# Patient Record
Sex: MALE | Race: OTHER | HISPANIC OR LATINO | Employment: OTHER | URBAN - METROPOLITAN AREA
[De-identification: names, ages, dates, MRNs, and addresses within clinical notes are randomized per-mention and may not be internally consistent; named-entity substitution may affect disease eponyms.]

---

## 2021-09-21 ENCOUNTER — HOSPITAL ENCOUNTER (EMERGENCY)
Facility: HOSPITAL | Age: 36
Discharge: HOME | End: 2021-09-22
Attending: EMERGENCY MEDICINE

## 2021-09-21 DIAGNOSIS — I10 HYPERTENSION: ICD-10-CM

## 2021-09-21 DIAGNOSIS — R74.8 ELEVATED LIPASE: ICD-10-CM

## 2021-09-21 DIAGNOSIS — R10.13 EPIGASTRIC ABDOMINAL PAIN: ICD-10-CM

## 2021-09-21 DIAGNOSIS — F10.10 ALCOHOL ABUSE: Primary | ICD-10-CM

## 2021-09-21 LAB — OB PNL STL: NEGATIVE

## 2021-09-21 PROCEDURE — 96366 THER/PROPH/DIAG IV INF ADDON: CPT

## 2021-09-21 PROCEDURE — 99285 EMERGENCY DEPT VISIT HI MDM: CPT | Mod: 25

## 2021-09-21 PROCEDURE — 83735 ASSAY OF MAGNESIUM: CPT | Performed by: EMERGENCY MEDICINE

## 2021-09-21 PROCEDURE — 82272 OCCULT BLD FECES 1-3 TESTS: CPT | Performed by: EMERGENCY MEDICINE

## 2021-09-21 PROCEDURE — 82077 ASSAY SPEC XCP UR&BREATH IA: CPT | Performed by: EMERGENCY MEDICINE

## 2021-09-21 PROCEDURE — 36415 COLL VENOUS BLD VENIPUNCTURE: CPT | Performed by: EMERGENCY MEDICINE

## 2021-09-21 PROCEDURE — 84484 ASSAY OF TROPONIN QUANT: CPT | Performed by: EMERGENCY MEDICINE

## 2021-09-21 PROCEDURE — 96375 TX/PRO/DX INJ NEW DRUG ADDON: CPT

## 2021-09-21 PROCEDURE — 96365 THER/PROPH/DIAG IV INF INIT: CPT

## 2021-09-21 PROCEDURE — 86900 BLOOD TYPING SEROLOGIC ABO: CPT | Performed by: EMERGENCY MEDICINE

## 2021-09-21 PROCEDURE — 85025 COMPLETE CBC W/AUTO DIFF WBC: CPT | Performed by: EMERGENCY MEDICINE

## 2021-09-21 PROCEDURE — 85610 PROTHROMBIN TIME: CPT | Performed by: EMERGENCY MEDICINE

## 2021-09-21 PROCEDURE — 80053 COMPREHEN METABOLIC PANEL: CPT | Performed by: EMERGENCY MEDICINE

## 2021-09-21 PROCEDURE — 83690 ASSAY OF LIPASE: CPT | Performed by: EMERGENCY MEDICINE

## 2021-09-21 RX ORDER — IBUPROFEN 200 MG
200 TABLET ORAL EVERY 6 HOURS PRN
COMMUNITY
End: 2022-04-22

## 2021-09-21 RX ORDER — ONDANSETRON 2 MG/ML
4 INJECTION INTRAMUSCULAR; INTRAVENOUS
Status: COMPLETED | OUTPATIENT
Start: 2021-09-21 | End: 2021-09-22

## 2021-09-21 RX ORDER — MORPHINE SULFATE 4 MG/ML
4 INJECTION, SOLUTION INTRAMUSCULAR; INTRAVENOUS
Status: COMPLETED | OUTPATIENT
Start: 2021-09-21 | End: 2021-09-22

## 2021-09-22 VITALS
RESPIRATION RATE: 20 BRPM | HEART RATE: 64 BPM | SYSTOLIC BLOOD PRESSURE: 149 MMHG | BODY MASS INDEX: 26.43 KG/M2 | TEMPERATURE: 98 F | OXYGEN SATURATION: 98 % | WEIGHT: 140 LBS | DIASTOLIC BLOOD PRESSURE: 85 MMHG | HEIGHT: 61 IN

## 2021-09-22 LAB
ABO + RH BLD: NORMAL
ALBUMIN SERPL BCP-MCNC: 4.3 G/DL (ref 3.5–5.2)
ALP SERPL-CCNC: 75 U/L (ref 55–135)
ALT SERPL W/O P-5'-P-CCNC: 27 U/L (ref 10–44)
AMPHET+METHAMPHET UR QL: NEGATIVE
ANION GAP SERPL CALC-SCNC: 14 MMOL/L (ref 8–16)
AST SERPL-CCNC: 39 U/L (ref 10–40)
BARBITURATES UR QL SCN>200 NG/ML: NEGATIVE
BASOPHILS # BLD AUTO: 0.03 K/UL (ref 0–0.2)
BASOPHILS NFR BLD: 0.6 % (ref 0–1.9)
BENZODIAZ UR QL SCN>200 NG/ML: NEGATIVE
BILIRUB SERPL-MCNC: 1 MG/DL (ref 0.1–1)
BILIRUB UR QL STRIP: NEGATIVE
BLD GP AB SCN CELLS X3 SERPL QL: NORMAL
BUN SERPL-MCNC: 7 MG/DL (ref 6–20)
BZE UR QL SCN: NEGATIVE
CALCIUM SERPL-MCNC: 8.7 MG/DL (ref 8.7–10.5)
CANNABINOIDS UR QL SCN: NEGATIVE
CHLORIDE SERPL-SCNC: 100 MMOL/L (ref 95–110)
CLARITY UR: CLEAR
CO2 SERPL-SCNC: 25 MMOL/L (ref 23–29)
COLOR UR: YELLOW
CREAT SERPL-MCNC: 0.7 MG/DL (ref 0.5–1.4)
CREAT UR-MCNC: <10 MG/DL (ref 23–375)
DIFFERENTIAL METHOD: ABNORMAL
EOSINOPHIL # BLD AUTO: 0.1 K/UL (ref 0–0.5)
EOSINOPHIL NFR BLD: 1.2 % (ref 0–8)
ERYTHROCYTE [DISTWIDTH] IN BLOOD BY AUTOMATED COUNT: 12.6 % (ref 11.5–14.5)
EST. GFR  (AFRICAN AMERICAN): >60 ML/MIN/1.73 M^2
EST. GFR  (NON AFRICAN AMERICAN): >60 ML/MIN/1.73 M^2
ETHANOL SERPL-MCNC: 336 MG/DL
GLUCOSE SERPL-MCNC: 129 MG/DL (ref 70–110)
GLUCOSE UR QL STRIP: NEGATIVE
HCT VFR BLD AUTO: 40.2 % (ref 40–54)
HGB BLD-MCNC: 14.1 G/DL (ref 14–18)
HGB UR QL STRIP: NEGATIVE
IMM GRANULOCYTES # BLD AUTO: 0.01 K/UL (ref 0–0.04)
IMM GRANULOCYTES NFR BLD AUTO: 0.2 % (ref 0–0.5)
INR PPP: 1.1
KETONES UR QL STRIP: NEGATIVE
LEUKOCYTE ESTERASE UR QL STRIP: NEGATIVE
LIPASE SERPL-CCNC: 109 U/L (ref 4–60)
LYMPHOCYTES # BLD AUTO: 2 K/UL (ref 1–4.8)
LYMPHOCYTES NFR BLD: 39.2 % (ref 18–48)
MAGNESIUM SERPL-MCNC: 1.9 MG/DL (ref 1.6–2.6)
MCH RBC QN AUTO: 31 PG (ref 27–31)
MCHC RBC AUTO-ENTMCNC: 35.1 G/DL (ref 32–36)
MCV RBC AUTO: 88 FL (ref 82–98)
MONOCYTES # BLD AUTO: 0.4 K/UL (ref 0.3–1)
MONOCYTES NFR BLD: 7.6 % (ref 4–15)
NEUTROPHILS # BLD AUTO: 2.6 K/UL (ref 1.8–7.7)
NEUTROPHILS NFR BLD: 51.2 % (ref 38–73)
NITRITE UR QL STRIP: NEGATIVE
NRBC BLD-RTO: 0 /100 WBC
OPIATES UR QL SCN: ABNORMAL
PCP UR QL SCN>25 NG/ML: NEGATIVE
PH UR STRIP: 8 [PH] (ref 5–8)
PLATELET # BLD AUTO: 245 K/UL (ref 150–450)
PMV BLD AUTO: 8.9 FL (ref 9.2–12.9)
POTASSIUM SERPL-SCNC: 3.2 MMOL/L (ref 3.5–5.1)
PROT SERPL-MCNC: 7.8 G/DL (ref 6–8.4)
PROT UR QL STRIP: NEGATIVE
PROTHROMBIN TIME: 13.4 SEC (ref 11.8–14.3)
RBC # BLD AUTO: 4.55 M/UL (ref 4.6–6.2)
SODIUM SERPL-SCNC: 139 MMOL/L (ref 136–145)
SP GR UR STRIP: 1.01 (ref 1–1.03)
TOXICOLOGY INFORMATION: ABNORMAL
TROPONIN I SERPL DL<=0.01 NG/ML-MCNC: <0.03 NG/ML
URN SPEC COLLECT METH UR: NORMAL
UROBILINOGEN UR STRIP-ACNC: NEGATIVE EU/DL
WBC # BLD AUTO: 5.1 K/UL (ref 3.9–12.7)

## 2021-09-22 PROCEDURE — 81003 URINALYSIS AUTO W/O SCOPE: CPT | Performed by: EMERGENCY MEDICINE

## 2021-09-22 PROCEDURE — 93010 ELECTROCARDIOGRAM REPORT: CPT | Mod: ,,, | Performed by: INTERNAL MEDICINE

## 2021-09-22 PROCEDURE — 93005 ELECTROCARDIOGRAM TRACING: CPT | Performed by: INTERNAL MEDICINE

## 2021-09-22 PROCEDURE — 96365 THER/PROPH/DIAG IV INF INIT: CPT

## 2021-09-22 PROCEDURE — 96375 TX/PRO/DX INJ NEW DRUG ADDON: CPT

## 2021-09-22 PROCEDURE — 80307 DRUG TEST PRSMV CHEM ANLYZR: CPT | Performed by: EMERGENCY MEDICINE

## 2021-09-22 PROCEDURE — 63600175 PHARM REV CODE 636 W HCPCS: Performed by: EMERGENCY MEDICINE

## 2021-09-22 PROCEDURE — 93010 EKG 12-LEAD: ICD-10-PCS | Mod: ,,, | Performed by: INTERNAL MEDICINE

## 2021-09-22 PROCEDURE — 25500020 PHARM REV CODE 255: Performed by: EMERGENCY MEDICINE

## 2021-09-22 PROCEDURE — 96366 THER/PROPH/DIAG IV INF ADDON: CPT

## 2021-09-22 PROCEDURE — 25000003 PHARM REV CODE 250: Performed by: EMERGENCY MEDICINE

## 2021-09-22 RX ORDER — PANTOPRAZOLE SODIUM 40 MG/1
40 TABLET, DELAYED RELEASE ORAL DAILY
Qty: 30 TABLET | Refills: 0 | Status: ON HOLD | OUTPATIENT
Start: 2021-09-22 | End: 2021-11-11

## 2021-09-22 RX ORDER — PANTOPRAZOLE SODIUM 40 MG/1
40 TABLET, DELAYED RELEASE ORAL DAILY
Qty: 30 TABLET | Refills: 0 | Status: SHIPPED | OUTPATIENT
Start: 2021-09-22 | End: 2021-09-22 | Stop reason: SDUPTHER

## 2021-09-22 RX ADMIN — MORPHINE SULFATE 4 MG: 4 INJECTION, SOLUTION INTRAMUSCULAR; INTRAVENOUS at 12:09

## 2021-09-22 RX ADMIN — SODIUM CHLORIDE 1000 ML: 0.9 INJECTION, SOLUTION INTRAVENOUS at 12:09

## 2021-09-22 RX ADMIN — FOLIC ACID: 5 INJECTION, SOLUTION INTRAMUSCULAR; INTRAVENOUS; SUBCUTANEOUS at 12:09

## 2021-09-22 RX ADMIN — ONDANSETRON 4 MG: 2 INJECTION INTRAMUSCULAR; INTRAVENOUS at 12:09

## 2021-09-22 RX ADMIN — IOHEXOL 100 ML: 350 INJECTION, SOLUTION INTRAVENOUS at 01:09

## 2021-09-25 ENCOUNTER — HOSPITAL ENCOUNTER (EMERGENCY)
Facility: HOSPITAL | Age: 36
Discharge: PSYCHIATRIC OR SUBSTANCE USE FACILITY | End: 2021-09-26
Attending: EMERGENCY MEDICINE
Payer: OTHER GOVERNMENT

## 2021-09-25 DIAGNOSIS — F23 ACUTE PSYCHOSIS: Primary | ICD-10-CM

## 2021-09-25 DIAGNOSIS — R44.3 HALLUCINATIONS: ICD-10-CM

## 2021-09-25 DIAGNOSIS — F22 PARANOID: ICD-10-CM

## 2021-09-25 PROCEDURE — 99285 EMERGENCY DEPT VISIT HI MDM: CPT

## 2021-09-26 VITALS
WEIGHT: 140 LBS | DIASTOLIC BLOOD PRESSURE: 80 MMHG | HEART RATE: 78 BPM | SYSTOLIC BLOOD PRESSURE: 135 MMHG | TEMPERATURE: 99 F | HEIGHT: 64 IN | RESPIRATION RATE: 18 BRPM | OXYGEN SATURATION: 98 % | BODY MASS INDEX: 23.9 KG/M2

## 2021-09-26 LAB
ALBUMIN SERPL BCP-MCNC: 4.4 G/DL (ref 3.5–5.2)
ALP SERPL-CCNC: 67 U/L (ref 55–135)
ALT SERPL W/O P-5'-P-CCNC: 32 U/L (ref 10–44)
AMPHET+METHAMPHET UR QL: NEGATIVE
ANION GAP SERPL CALC-SCNC: 13 MMOL/L (ref 8–16)
APAP SERPL-MCNC: <10 UG/ML (ref 10–20)
AST SERPL-CCNC: 65 U/L (ref 10–40)
BARBITURATES UR QL SCN>200 NG/ML: NEGATIVE
BASOPHILS # BLD AUTO: 0.02 K/UL (ref 0–0.2)
BASOPHILS NFR BLD: 0.3 % (ref 0–1.9)
BENZODIAZ UR QL SCN>200 NG/ML: NEGATIVE
BILIRUB SERPL-MCNC: 0.8 MG/DL (ref 0.1–1)
BILIRUB UR QL STRIP: NEGATIVE
BUN SERPL-MCNC: 9 MG/DL (ref 6–20)
BZE UR QL SCN: NEGATIVE
CALCIUM SERPL-MCNC: 8.8 MG/DL (ref 8.7–10.5)
CANNABINOIDS UR QL SCN: NEGATIVE
CHLORIDE SERPL-SCNC: 96 MMOL/L (ref 95–110)
CLARITY UR: CLEAR
CO2 SERPL-SCNC: 26 MMOL/L (ref 23–29)
COLOR UR: YELLOW
CREAT SERPL-MCNC: 0.8 MG/DL (ref 0.5–1.4)
CREAT UR-MCNC: 92 MG/DL (ref 23–375)
DIFFERENTIAL METHOD: ABNORMAL
EOSINOPHIL # BLD AUTO: 0.1 K/UL (ref 0–0.5)
EOSINOPHIL NFR BLD: 0.8 % (ref 0–8)
ERYTHROCYTE [DISTWIDTH] IN BLOOD BY AUTOMATED COUNT: 13.1 % (ref 11.5–14.5)
EST. GFR  (AFRICAN AMERICAN): >60 ML/MIN/1.73 M^2
EST. GFR  (NON AFRICAN AMERICAN): >60 ML/MIN/1.73 M^2
ETHANOL SERPL-MCNC: <5 MG/DL
GLUCOSE SERPL-MCNC: 130 MG/DL (ref 70–110)
GLUCOSE UR QL STRIP: NEGATIVE
HCT VFR BLD AUTO: 38.5 % (ref 40–54)
HGB BLD-MCNC: 13.6 G/DL (ref 14–18)
HGB UR QL STRIP: NEGATIVE
IMM GRANULOCYTES # BLD AUTO: 0.03 K/UL (ref 0–0.04)
IMM GRANULOCYTES NFR BLD AUTO: 0.4 % (ref 0–0.5)
KETONES UR QL STRIP: NEGATIVE
LEUKOCYTE ESTERASE UR QL STRIP: NEGATIVE
LYMPHOCYTES # BLD AUTO: 1.3 K/UL (ref 1–4.8)
LYMPHOCYTES NFR BLD: 17.1 % (ref 18–48)
MCH RBC QN AUTO: 32.2 PG (ref 27–31)
MCHC RBC AUTO-ENTMCNC: 35.3 G/DL (ref 32–36)
MCV RBC AUTO: 91 FL (ref 82–98)
MONOCYTES # BLD AUTO: 0.9 K/UL (ref 0.3–1)
MONOCYTES NFR BLD: 12.2 % (ref 4–15)
NEUTROPHILS # BLD AUTO: 5.3 K/UL (ref 1.8–7.7)
NEUTROPHILS NFR BLD: 69.2 % (ref 38–73)
NITRITE UR QL STRIP: NEGATIVE
NRBC BLD-RTO: 0 /100 WBC
OPIATES UR QL SCN: NEGATIVE
PCP UR QL SCN>25 NG/ML: NEGATIVE
PH UR STRIP: 6 [PH] (ref 5–8)
PLATELET # BLD AUTO: 215 K/UL (ref 150–450)
PMV BLD AUTO: 9.4 FL (ref 9.2–12.9)
POTASSIUM SERPL-SCNC: 3.3 MMOL/L (ref 3.5–5.1)
PROT SERPL-MCNC: 8.1 G/DL (ref 6–8.4)
PROT UR QL STRIP: ABNORMAL
RBC # BLD AUTO: 4.22 M/UL (ref 4.6–6.2)
SALICYLATES SERPL-MCNC: <4 MG/DL (ref 15–30)
SARS-COV-2 RDRP RESP QL NAA+PROBE: NEGATIVE
SODIUM SERPL-SCNC: 135 MMOL/L (ref 136–145)
SP GR UR STRIP: 1.01 (ref 1–1.03)
TOXICOLOGY INFORMATION: NORMAL
TSH SERPL DL<=0.005 MIU/L-ACNC: 1.68 UIU/ML (ref 0.34–5.6)
URN SPEC COLLECT METH UR: ABNORMAL
UROBILINOGEN UR STRIP-ACNC: NEGATIVE EU/DL
WBC # BLD AUTO: 7.71 K/UL (ref 3.9–12.7)

## 2021-09-26 PROCEDURE — 80179 DRUG ASSAY SALICYLATE: CPT | Performed by: EMERGENCY MEDICINE

## 2021-09-26 PROCEDURE — 82077 ASSAY SPEC XCP UR&BREATH IA: CPT | Performed by: EMERGENCY MEDICINE

## 2021-09-26 PROCEDURE — 80307 DRUG TEST PRSMV CHEM ANLYZR: CPT | Performed by: EMERGENCY MEDICINE

## 2021-09-26 PROCEDURE — 96365 THER/PROPH/DIAG IV INF INIT: CPT

## 2021-09-26 PROCEDURE — 85025 COMPLETE CBC W/AUTO DIFF WBC: CPT | Performed by: EMERGENCY MEDICINE

## 2021-09-26 PROCEDURE — 96375 TX/PRO/DX INJ NEW DRUG ADDON: CPT

## 2021-09-26 PROCEDURE — U0002 COVID-19 LAB TEST NON-CDC: HCPCS | Performed by: EMERGENCY MEDICINE

## 2021-09-26 PROCEDURE — 25000003 PHARM REV CODE 250: Performed by: EMERGENCY MEDICINE

## 2021-09-26 PROCEDURE — 80053 COMPREHEN METABOLIC PANEL: CPT | Performed by: EMERGENCY MEDICINE

## 2021-09-26 PROCEDURE — 96366 THER/PROPH/DIAG IV INF ADDON: CPT

## 2021-09-26 PROCEDURE — 81003 URINALYSIS AUTO W/O SCOPE: CPT | Mod: 59 | Performed by: EMERGENCY MEDICINE

## 2021-09-26 PROCEDURE — 80143 DRUG ASSAY ACETAMINOPHEN: CPT | Performed by: EMERGENCY MEDICINE

## 2021-09-26 PROCEDURE — 96376 TX/PRO/DX INJ SAME DRUG ADON: CPT

## 2021-09-26 PROCEDURE — 63600175 PHARM REV CODE 636 W HCPCS: Performed by: EMERGENCY MEDICINE

## 2021-09-26 PROCEDURE — 84443 ASSAY THYROID STIM HORMONE: CPT | Performed by: EMERGENCY MEDICINE

## 2021-09-26 RX ORDER — LORAZEPAM 2 MG/ML
1 INJECTION INTRAMUSCULAR
Status: COMPLETED | OUTPATIENT
Start: 2021-09-26 | End: 2021-09-26

## 2021-09-26 RX ADMIN — FOLIC ACID: 5 INJECTION, SOLUTION INTRAMUSCULAR; INTRAVENOUS; SUBCUTANEOUS at 02:09

## 2021-09-26 RX ADMIN — LORAZEPAM 1 MG: 2 INJECTION INTRAMUSCULAR; INTRAVENOUS at 12:09

## 2021-11-08 ENCOUNTER — HOSPITAL ENCOUNTER (INPATIENT)
Facility: HOSPITAL | Age: 36
LOS: 3 days | Discharge: HOME | DRG: 897 | End: 2021-11-11
Attending: EMERGENCY MEDICINE | Admitting: STUDENT IN AN ORGANIZED HEALTH CARE EDUCATION/TRAINING PROGRAM
Payer: OTHER GOVERNMENT

## 2021-11-08 DIAGNOSIS — F10.939 ALCOHOL WITHDRAWAL SYNDROME WITH COMPLICATION: ICD-10-CM

## 2021-11-08 DIAGNOSIS — R07.9 CHEST PAIN: ICD-10-CM

## 2021-11-08 DIAGNOSIS — G44.021 INTRACTABLE CHRONIC CLUSTER HEADACHE: Primary | ICD-10-CM

## 2021-11-08 DIAGNOSIS — G44.011 INTRACTABLE EPISODIC CLUSTER HEADACHE: ICD-10-CM

## 2021-11-08 PROBLEM — F10.930 ALCOHOL WITHDRAWAL SYNDROME WITHOUT COMPLICATION: Status: ACTIVE | Noted: 2021-11-08

## 2021-11-08 LAB
ALBUMIN SERPL BCP-MCNC: 4.5 G/DL (ref 3.5–5.2)
ALP SERPL-CCNC: 79 U/L (ref 55–135)
ALT SERPL W/O P-5'-P-CCNC: 31 U/L (ref 10–44)
AMPHET+METHAMPHET UR QL: ABNORMAL
ANION GAP SERPL CALC-SCNC: 16 MMOL/L (ref 8–16)
AST SERPL-CCNC: 55 U/L (ref 10–40)
BACTERIA #/AREA URNS HPF: NEGATIVE /HPF
BARBITURATES UR QL SCN>200 NG/ML: NEGATIVE
BASOPHILS # BLD AUTO: 0.02 K/UL (ref 0–0.2)
BASOPHILS NFR BLD: 0.2 % (ref 0–1.9)
BENZODIAZ UR QL SCN>200 NG/ML: NEGATIVE
BILIRUB SERPL-MCNC: 1.1 MG/DL (ref 0.1–1)
BILIRUB UR QL STRIP: NEGATIVE
BNP SERPL-MCNC: 30 PG/ML (ref 0–99)
BUN SERPL-MCNC: 9 MG/DL (ref 6–20)
BZE UR QL SCN: NEGATIVE
CALCIUM SERPL-MCNC: 9.4 MG/DL (ref 8.7–10.5)
CANNABINOIDS UR QL SCN: NEGATIVE
CHLORIDE SERPL-SCNC: 89 MMOL/L (ref 95–110)
CK SERPL-CCNC: 222 U/L (ref 20–200)
CLARITY UR: CLEAR
CO2 SERPL-SCNC: 27 MMOL/L (ref 23–29)
COLOR UR: YELLOW
CREAT SERPL-MCNC: 0.6 MG/DL (ref 0.5–1.4)
CREAT UR-MCNC: 74 MG/DL (ref 23–375)
D DIMER PPP IA.FEU-MCNC: 0.67 UG/ML FEU
D DIMER PPP IA.FEU-MCNC: 0.72 UG/ML FEU
DIFFERENTIAL METHOD: ABNORMAL
EOSINOPHIL # BLD AUTO: 0 K/UL (ref 0–0.5)
EOSINOPHIL NFR BLD: 0.1 % (ref 0–8)
ERYTHROCYTE [DISTWIDTH] IN BLOOD BY AUTOMATED COUNT: 13.1 % (ref 11.5–14.5)
EST. GFR  (AFRICAN AMERICAN): >60 ML/MIN/1.73 M^2
EST. GFR  (NON AFRICAN AMERICAN): >60 ML/MIN/1.73 M^2
GLUCOSE SERPL-MCNC: 181 MG/DL (ref 70–110)
GLUCOSE UR QL STRIP: NEGATIVE
HCT VFR BLD AUTO: 42.8 % (ref 40–54)
HGB BLD-MCNC: 15.3 G/DL (ref 14–18)
HGB UR QL STRIP: ABNORMAL
HYALINE CASTS #/AREA URNS LPF: 1 /LPF
IMM GRANULOCYTES # BLD AUTO: 0.04 K/UL (ref 0–0.04)
IMM GRANULOCYTES NFR BLD AUTO: 0.4 % (ref 0–0.5)
KETONES UR QL STRIP: NEGATIVE
LACTATE SERPL-SCNC: 2 MMOL/L (ref 0.5–1.9)
LACTATE SERPL-SCNC: 3 MMOL/L (ref 0.5–1.9)
LEUKOCYTE ESTERASE UR QL STRIP: NEGATIVE
LIPASE SERPL-CCNC: 100 U/L (ref 4–60)
LYMPHOCYTES # BLD AUTO: 0.9 K/UL (ref 1–4.8)
LYMPHOCYTES NFR BLD: 8.2 % (ref 18–48)
MAGNESIUM SERPL-MCNC: 1.5 MG/DL (ref 1.6–2.6)
MCH RBC QN AUTO: 31.3 PG (ref 27–31)
MCHC RBC AUTO-ENTMCNC: 35.7 G/DL (ref 32–36)
MCV RBC AUTO: 88 FL (ref 82–98)
MICROSCOPIC COMMENT: ABNORMAL
MONOCYTES # BLD AUTO: 0.6 K/UL (ref 0.3–1)
MONOCYTES NFR BLD: 5.6 % (ref 4–15)
NEUTROPHILS # BLD AUTO: 9.8 K/UL (ref 1.8–7.7)
NEUTROPHILS NFR BLD: 85.5 % (ref 38–73)
NITRITE UR QL STRIP: NEGATIVE
NRBC BLD-RTO: 0 /100 WBC
OPIATES UR QL SCN: ABNORMAL
PCP UR QL SCN>25 NG/ML: NEGATIVE
PH UR STRIP: 7 [PH] (ref 5–8)
PLATELET # BLD AUTO: 201 K/UL (ref 150–450)
PMV BLD AUTO: 9.1 FL (ref 9.2–12.9)
POTASSIUM SERPL-SCNC: 3.2 MMOL/L (ref 3.5–5.1)
PROT SERPL-MCNC: 8.1 G/DL (ref 6–8.4)
PROT UR QL STRIP: ABNORMAL
RBC # BLD AUTO: 4.89 M/UL (ref 4.6–6.2)
RBC #/AREA URNS HPF: 13 /HPF (ref 0–4)
SARS-COV-2 RDRP RESP QL NAA+PROBE: NEGATIVE
SODIUM SERPL-SCNC: 132 MMOL/L (ref 136–145)
SP GR UR STRIP: 1.01 (ref 1–1.03)
SQUAMOUS #/AREA URNS HPF: 0 /HPF
TOXICOLOGY INFORMATION: ABNORMAL
TROPONIN I SERPL DL<=0.01 NG/ML-MCNC: <0.03 NG/ML
URN SPEC COLLECT METH UR: ABNORMAL
UROBILINOGEN UR STRIP-ACNC: NEGATIVE EU/DL
WBC # BLD AUTO: 11.41 K/UL (ref 3.9–12.7)
WBC #/AREA URNS HPF: 1 /HPF (ref 0–5)

## 2021-11-08 PROCEDURE — U0002 COVID-19 LAB TEST NON-CDC: HCPCS | Performed by: EMERGENCY MEDICINE

## 2021-11-08 PROCEDURE — 25000003 PHARM REV CODE 250: Performed by: EMERGENCY MEDICINE

## 2021-11-08 PROCEDURE — 99284 EMERGENCY DEPT VISIT MOD MDM: CPT | Mod: 25

## 2021-11-08 PROCEDURE — 85025 COMPLETE CBC W/AUTO DIFF WBC: CPT | Performed by: EMERGENCY MEDICINE

## 2021-11-08 PROCEDURE — 83605 ASSAY OF LACTIC ACID: CPT | Mod: 91 | Performed by: STUDENT IN AN ORGANIZED HEALTH CARE EDUCATION/TRAINING PROGRAM

## 2021-11-08 PROCEDURE — 63600175 PHARM REV CODE 636 W HCPCS: Performed by: STUDENT IN AN ORGANIZED HEALTH CARE EDUCATION/TRAINING PROGRAM

## 2021-11-08 PROCEDURE — 82550 ASSAY OF CK (CPK): CPT | Performed by: STUDENT IN AN ORGANIZED HEALTH CARE EDUCATION/TRAINING PROGRAM

## 2021-11-08 PROCEDURE — 36415 COLL VENOUS BLD VENIPUNCTURE: CPT | Performed by: STUDENT IN AN ORGANIZED HEALTH CARE EDUCATION/TRAINING PROGRAM

## 2021-11-08 PROCEDURE — 25000003 PHARM REV CODE 250: Performed by: STUDENT IN AN ORGANIZED HEALTH CARE EDUCATION/TRAINING PROGRAM

## 2021-11-08 PROCEDURE — 96376 TX/PRO/DX INJ SAME DRUG ADON: CPT

## 2021-11-08 PROCEDURE — 81001 URINALYSIS AUTO W/SCOPE: CPT | Performed by: STUDENT IN AN ORGANIZED HEALTH CARE EDUCATION/TRAINING PROGRAM

## 2021-11-08 PROCEDURE — C9113 INJ PANTOPRAZOLE SODIUM, VIA: HCPCS | Performed by: EMERGENCY MEDICINE

## 2021-11-08 PROCEDURE — 83880 ASSAY OF NATRIURETIC PEPTIDE: CPT | Performed by: EMERGENCY MEDICINE

## 2021-11-08 PROCEDURE — 80053 COMPREHEN METABOLIC PANEL: CPT | Performed by: EMERGENCY MEDICINE

## 2021-11-08 PROCEDURE — 21000000 HC CCU ICU ROOM CHARGE

## 2021-11-08 PROCEDURE — 84484 ASSAY OF TROPONIN QUANT: CPT | Performed by: EMERGENCY MEDICINE

## 2021-11-08 PROCEDURE — 63600175 PHARM REV CODE 636 W HCPCS: Performed by: EMERGENCY MEDICINE

## 2021-11-08 PROCEDURE — 94761 N-INVAS EAR/PLS OXIMETRY MLT: CPT

## 2021-11-08 PROCEDURE — 85379 FIBRIN DEGRADATION QUANT: CPT | Performed by: STUDENT IN AN ORGANIZED HEALTH CARE EDUCATION/TRAINING PROGRAM

## 2021-11-08 PROCEDURE — 83735 ASSAY OF MAGNESIUM: CPT | Performed by: EMERGENCY MEDICINE

## 2021-11-08 PROCEDURE — 93010 EKG 12-LEAD: ICD-10-PCS | Mod: ,,, | Performed by: INTERNAL MEDICINE

## 2021-11-08 PROCEDURE — 80307 DRUG TEST PRSMV CHEM ANLYZR: CPT | Performed by: STUDENT IN AN ORGANIZED HEALTH CARE EDUCATION/TRAINING PROGRAM

## 2021-11-08 PROCEDURE — 93010 ELECTROCARDIOGRAM REPORT: CPT | Mod: ,,, | Performed by: INTERNAL MEDICINE

## 2021-11-08 PROCEDURE — 83690 ASSAY OF LIPASE: CPT | Performed by: STUDENT IN AN ORGANIZED HEALTH CARE EDUCATION/TRAINING PROGRAM

## 2021-11-08 PROCEDURE — 25500020 PHARM REV CODE 255: Performed by: STUDENT IN AN ORGANIZED HEALTH CARE EDUCATION/TRAINING PROGRAM

## 2021-11-08 PROCEDURE — 93005 ELECTROCARDIOGRAM TRACING: CPT | Performed by: INTERNAL MEDICINE

## 2021-11-08 PROCEDURE — 96375 TX/PRO/DX INJ NEW DRUG ADDON: CPT

## 2021-11-08 PROCEDURE — 96374 THER/PROPH/DIAG INJ IV PUSH: CPT

## 2021-11-08 RX ORDER — CHLORDIAZEPOXIDE HYDROCHLORIDE 10 MG/1
10 CAPSULE, GELATIN COATED ORAL EVERY 6 HOURS
Status: DISCONTINUED | OUTPATIENT
Start: 2021-11-08 | End: 2021-11-09

## 2021-11-08 RX ORDER — PROCHLORPERAZINE EDISYLATE 5 MG/ML
5 INJECTION INTRAMUSCULAR; INTRAVENOUS EVERY 6 HOURS PRN
Status: DISCONTINUED | OUTPATIENT
Start: 2021-11-08 | End: 2021-11-11 | Stop reason: HOSPADM

## 2021-11-08 RX ORDER — PANTOPRAZOLE SODIUM 40 MG/10ML
40 INJECTION, POWDER, LYOPHILIZED, FOR SOLUTION INTRAVENOUS
Status: COMPLETED | OUTPATIENT
Start: 2021-11-08 | End: 2021-11-08

## 2021-11-08 RX ORDER — PROCHLORPERAZINE EDISYLATE 5 MG/ML
5 INJECTION INTRAMUSCULAR; INTRAVENOUS ONCE
Status: COMPLETED | OUTPATIENT
Start: 2021-11-08 | End: 2021-11-08

## 2021-11-08 RX ORDER — ASPIRIN 325 MG
325 TABLET ORAL
Status: COMPLETED | OUTPATIENT
Start: 2021-11-08 | End: 2021-11-08

## 2021-11-08 RX ORDER — PANTOPRAZOLE SODIUM 40 MG/1
40 TABLET, DELAYED RELEASE ORAL DAILY
Status: DISCONTINUED | OUTPATIENT
Start: 2021-11-08 | End: 2021-11-11 | Stop reason: HOSPADM

## 2021-11-08 RX ORDER — ACETAMINOPHEN 500 MG
1000 TABLET ORAL
Status: COMPLETED | OUTPATIENT
Start: 2021-11-08 | End: 2021-11-08

## 2021-11-08 RX ORDER — MAGNESIUM SULFATE HEPTAHYDRATE 40 MG/ML
2 INJECTION, SOLUTION INTRAVENOUS ONCE
Status: COMPLETED | OUTPATIENT
Start: 2021-11-08 | End: 2021-11-08

## 2021-11-08 RX ORDER — LORAZEPAM 2 MG/ML
1 INJECTION INTRAMUSCULAR
Status: COMPLETED | OUTPATIENT
Start: 2021-11-08 | End: 2021-11-08

## 2021-11-08 RX ORDER — MORPHINE SULFATE 4 MG/ML
4 INJECTION, SOLUTION INTRAMUSCULAR; INTRAVENOUS
Status: COMPLETED | OUTPATIENT
Start: 2021-11-08 | End: 2021-11-08

## 2021-11-08 RX ORDER — LORAZEPAM 2 MG/ML
2 INJECTION INTRAMUSCULAR EVERY 4 HOURS PRN
Status: DISCONTINUED | OUTPATIENT
Start: 2021-11-08 | End: 2021-11-11 | Stop reason: HOSPADM

## 2021-11-08 RX ORDER — LABETALOL 100 MG/1
200 TABLET, FILM COATED ORAL EVERY 12 HOURS
Status: DISCONTINUED | OUTPATIENT
Start: 2021-11-08 | End: 2021-11-11 | Stop reason: HOSPADM

## 2021-11-08 RX ORDER — MORPHINE SULFATE 4 MG/ML
4 INJECTION, SOLUTION INTRAMUSCULAR; INTRAVENOUS EVERY 4 HOURS PRN
Status: DISCONTINUED | OUTPATIENT
Start: 2021-11-08 | End: 2021-11-11 | Stop reason: HOSPADM

## 2021-11-08 RX ORDER — SODIUM CHLORIDE, SODIUM LACTATE, POTASSIUM CHLORIDE, CALCIUM CHLORIDE 600; 310; 30; 20 MG/100ML; MG/100ML; MG/100ML; MG/100ML
INJECTION, SOLUTION INTRAVENOUS CONTINUOUS
Status: DISCONTINUED | OUTPATIENT
Start: 2021-11-08 | End: 2021-11-09

## 2021-11-08 RX ORDER — LABETALOL HYDROCHLORIDE 5 MG/ML
10 INJECTION, SOLUTION INTRAVENOUS EVERY 4 HOURS PRN
Status: DISCONTINUED | OUTPATIENT
Start: 2021-11-08 | End: 2021-11-09

## 2021-11-08 RX ORDER — MORPHINE SULFATE 2 MG/ML
2 INJECTION, SOLUTION INTRAMUSCULAR; INTRAVENOUS EVERY 4 HOURS PRN
Status: DISCONTINUED | OUTPATIENT
Start: 2021-11-08 | End: 2021-11-11 | Stop reason: HOSPADM

## 2021-11-08 RX ORDER — ACETAMINOPHEN 325 MG/1
650 TABLET ORAL EVERY 4 HOURS PRN
Status: DISCONTINUED | OUTPATIENT
Start: 2021-11-08 | End: 2021-11-11 | Stop reason: HOSPADM

## 2021-11-08 RX ORDER — SODIUM CHLORIDE 0.9 % (FLUSH) 0.9 %
10 SYRINGE (ML) INJECTION
Status: DISCONTINUED | OUTPATIENT
Start: 2021-11-08 | End: 2021-11-11 | Stop reason: HOSPADM

## 2021-11-08 RX ORDER — SODIUM,POTASSIUM PHOSPHATES 280-250MG
1 POWDER IN PACKET (EA) ORAL ONCE
Status: COMPLETED | OUTPATIENT
Start: 2021-11-08 | End: 2021-11-08

## 2021-11-08 RX ORDER — SUCRALFATE 1 G/1
1 TABLET ORAL
Status: DISCONTINUED | OUTPATIENT
Start: 2021-11-08 | End: 2021-11-11 | Stop reason: HOSPADM

## 2021-11-08 RX ORDER — ONDANSETRON 2 MG/ML
4 INJECTION INTRAMUSCULAR; INTRAVENOUS EVERY 8 HOURS PRN
Status: DISCONTINUED | OUTPATIENT
Start: 2021-11-08 | End: 2021-11-11 | Stop reason: HOSPADM

## 2021-11-08 RX ORDER — LABETALOL HYDROCHLORIDE 5 MG/ML
10 INJECTION, SOLUTION INTRAVENOUS
Status: COMPLETED | OUTPATIENT
Start: 2021-11-08 | End: 2021-11-08

## 2021-11-08 RX ADMIN — LORAZEPAM 1 MG: 2 INJECTION INTRAMUSCULAR; INTRAVENOUS at 10:11

## 2021-11-08 RX ADMIN — POTASSIUM, SODIUM PHOSPHATES 280 MG-160 MG-250 MG ORAL POWDER PACKET 1 PACKET: POWDER IN PACKET at 03:11

## 2021-11-08 RX ADMIN — POTASSIUM PHOSPHATE, MONOBASIC POTASSIUM PHOSPHATE, DIBASIC 15 MMOL: 224; 236 INJECTION, SOLUTION, CONCENTRATE INTRAVENOUS at 04:11

## 2021-11-08 RX ADMIN — MORPHINE SULFATE 4 MG: 4 INJECTION, SOLUTION INTRAMUSCULAR; INTRAVENOUS at 10:11

## 2021-11-08 RX ADMIN — LABETALOL HYDROCHLORIDE 200 MG: 200 TABLET, FILM COATED ORAL at 02:11

## 2021-11-08 RX ADMIN — MORPHINE SULFATE 4 MG: 4 INJECTION, SOLUTION INTRAMUSCULAR; INTRAVENOUS at 12:11

## 2021-11-08 RX ADMIN — PANTOPRAZOLE SODIUM 40 MG: 40 TABLET, DELAYED RELEASE ORAL at 02:11

## 2021-11-08 RX ADMIN — ACETAMINOPHEN 1000 MG: 500 TABLET, FILM COATED ORAL at 10:11

## 2021-11-08 RX ADMIN — ASPIRIN 325 MG ORAL TABLET 325 MG: 325 PILL ORAL at 10:11

## 2021-11-08 RX ADMIN — ACETAMINOPHEN 650 MG: 325 TABLET, FILM COATED ORAL at 02:11

## 2021-11-08 RX ADMIN — IOHEXOL 100 ML: 350 INJECTION, SOLUTION INTRAVENOUS at 04:11

## 2021-11-08 RX ADMIN — MORPHINE SULFATE 2 MG: 2 INJECTION, SOLUTION INTRAMUSCULAR; INTRAVENOUS at 11:11

## 2021-11-08 RX ADMIN — DICYCLOMINE HYDROCHLORIDE 50 ML: 10 SOLUTION ORAL at 10:11

## 2021-11-08 RX ADMIN — SUCRALFATE 1 G: 1 TABLET ORAL at 05:11

## 2021-11-08 RX ADMIN — SODIUM CHLORIDE, SODIUM LACTATE, POTASSIUM CHLORIDE, AND CALCIUM CHLORIDE: .6; .31; .03; .02 INJECTION, SOLUTION INTRAVENOUS at 05:11

## 2021-11-08 RX ADMIN — CHLORDIAZEPOXIDE HYDROCHLORIDE 10 MG: 10 CAPSULE ORAL at 05:11

## 2021-11-08 RX ADMIN — LABETALOL HYDROCHLORIDE 200 MG: 200 TABLET, FILM COATED ORAL at 08:11

## 2021-11-08 RX ADMIN — PROCHLORPERAZINE EDISYLATE 5 MG: 5 INJECTION INTRAMUSCULAR; INTRAVENOUS at 02:11

## 2021-11-08 RX ADMIN — FOLIC ACID: 5 INJECTION, SOLUTION INTRAMUSCULAR; INTRAVENOUS; SUBCUTANEOUS at 12:11

## 2021-11-08 RX ADMIN — LABETALOL HYDROCHLORIDE 10 MG: 5 INJECTION, SOLUTION INTRAVENOUS at 12:11

## 2021-11-08 RX ADMIN — LABETALOL HYDROCHLORIDE 10 MG: 5 INJECTION INTRAVENOUS at 05:11

## 2021-11-08 RX ADMIN — MAGNESIUM SULFATE IN WATER 2 G: 40 INJECTION, SOLUTION INTRAVENOUS at 12:11

## 2021-11-08 RX ADMIN — SUCRALFATE 1 G: 1 TABLET ORAL at 08:11

## 2021-11-08 RX ADMIN — POTASSIUM BICARBONATE 50 MEQ: 977.5 TABLET, EFFERVESCENT ORAL at 12:11

## 2021-11-08 RX ADMIN — LORAZEPAM 1 MG: 2 INJECTION INTRAMUSCULAR; INTRAVENOUS at 12:11

## 2021-11-08 RX ADMIN — SODIUM CHLORIDE, SODIUM LACTATE, POTASSIUM CHLORIDE, AND CALCIUM CHLORIDE 1000 ML: .6; .31; .03; .02 INJECTION, SOLUTION INTRAVENOUS at 01:11

## 2021-11-08 RX ADMIN — LORAZEPAM 2 MG: 2 INJECTION INTRAMUSCULAR; INTRAVENOUS at 11:11

## 2021-11-08 RX ADMIN — CHLORDIAZEPOXIDE HYDROCHLORIDE 10 MG: 10 CAPSULE ORAL at 11:11

## 2021-11-08 RX ADMIN — MORPHINE SULFATE 4 MG: 4 INJECTION, SOLUTION INTRAMUSCULAR; INTRAVENOUS at 05:11

## 2021-11-08 RX ADMIN — PANTOPRAZOLE SODIUM 40 MG: 40 INJECTION, POWDER, LYOPHILIZED, FOR SOLUTION INTRAVENOUS at 10:11

## 2021-11-09 PROBLEM — I10 HYPERTENSION: Status: ACTIVE | Noted: 2021-11-09

## 2021-11-09 LAB
ALBUMIN SERPL BCP-MCNC: 4.1 G/DL (ref 3.5–5.2)
ALP SERPL-CCNC: 79 U/L (ref 55–135)
ALT SERPL W/O P-5'-P-CCNC: 26 U/L (ref 10–44)
ANION GAP SERPL CALC-SCNC: 9 MMOL/L (ref 8–16)
AST SERPL-CCNC: 36 U/L (ref 10–40)
BASOPHILS # BLD AUTO: 0.01 K/UL (ref 0–0.2)
BASOPHILS NFR BLD: 0.2 % (ref 0–1.9)
BILIRUB SERPL-MCNC: 1.8 MG/DL (ref 0.1–1)
BUN SERPL-MCNC: 5 MG/DL (ref 6–20)
CALCIUM SERPL-MCNC: 8.7 MG/DL (ref 8.7–10.5)
CHLORIDE SERPL-SCNC: 92 MMOL/L (ref 95–110)
CO2 SERPL-SCNC: 28 MMOL/L (ref 23–29)
CREAT SERPL-MCNC: 0.6 MG/DL (ref 0.5–1.4)
DIFFERENTIAL METHOD: ABNORMAL
EOSINOPHIL # BLD AUTO: 0.1 K/UL (ref 0–0.5)
EOSINOPHIL NFR BLD: 0.9 % (ref 0–8)
ERYTHROCYTE [DISTWIDTH] IN BLOOD BY AUTOMATED COUNT: 13 % (ref 11.5–14.5)
EST. GFR  (AFRICAN AMERICAN): >60 ML/MIN/1.73 M^2
EST. GFR  (NON AFRICAN AMERICAN): >60 ML/MIN/1.73 M^2
GLUCOSE SERPL-MCNC: 118 MG/DL (ref 70–110)
HCT VFR BLD AUTO: 39.4 % (ref 40–54)
HGB BLD-MCNC: 13.8 G/DL (ref 14–18)
IMM GRANULOCYTES # BLD AUTO: 0.02 K/UL (ref 0–0.04)
IMM GRANULOCYTES NFR BLD AUTO: 0.3 % (ref 0–0.5)
LACTATE SERPL-SCNC: 1.2 MMOL/L (ref 0.5–1.9)
LYMPHOCYTES # BLD AUTO: 0.9 K/UL (ref 1–4.8)
LYMPHOCYTES NFR BLD: 13.3 % (ref 18–48)
MAGNESIUM SERPL-MCNC: 2 MG/DL (ref 1.6–2.6)
MCH RBC QN AUTO: 31.7 PG (ref 27–31)
MCHC RBC AUTO-ENTMCNC: 35 G/DL (ref 32–36)
MCV RBC AUTO: 91 FL (ref 82–98)
MONOCYTES # BLD AUTO: 0.5 K/UL (ref 0.3–1)
MONOCYTES NFR BLD: 7.9 % (ref 4–15)
NEUTROPHILS # BLD AUTO: 5 K/UL (ref 1.8–7.7)
NEUTROPHILS NFR BLD: 77.4 % (ref 38–73)
NRBC BLD-RTO: 0 /100 WBC
PHOSPHATE SERPL-MCNC: 3 MG/DL (ref 2.7–4.5)
PLATELET # BLD AUTO: 156 K/UL (ref 150–450)
PMV BLD AUTO: 9.4 FL (ref 9.2–12.9)
POTASSIUM SERPL-SCNC: 3.1 MMOL/L (ref 3.5–5.1)
PROT SERPL-MCNC: 7.3 G/DL (ref 6–8.4)
RBC # BLD AUTO: 4.35 M/UL (ref 4.6–6.2)
SODIUM SERPL-SCNC: 129 MMOL/L (ref 136–145)
WBC # BLD AUTO: 6.48 K/UL (ref 3.9–12.7)

## 2021-11-09 PROCEDURE — 84100 ASSAY OF PHOSPHORUS: CPT | Performed by: STUDENT IN AN ORGANIZED HEALTH CARE EDUCATION/TRAINING PROGRAM

## 2021-11-09 PROCEDURE — 25000003 PHARM REV CODE 250: Performed by: STUDENT IN AN ORGANIZED HEALTH CARE EDUCATION/TRAINING PROGRAM

## 2021-11-09 PROCEDURE — 94799 UNLISTED PULMONARY SVC/PX: CPT

## 2021-11-09 PROCEDURE — 83605 ASSAY OF LACTIC ACID: CPT | Performed by: STUDENT IN AN ORGANIZED HEALTH CARE EDUCATION/TRAINING PROGRAM

## 2021-11-09 PROCEDURE — 63600175 PHARM REV CODE 636 W HCPCS: Performed by: STUDENT IN AN ORGANIZED HEALTH CARE EDUCATION/TRAINING PROGRAM

## 2021-11-09 PROCEDURE — 80053 COMPREHEN METABOLIC PANEL: CPT | Performed by: STUDENT IN AN ORGANIZED HEALTH CARE EDUCATION/TRAINING PROGRAM

## 2021-11-09 PROCEDURE — 20000000 HC ICU ROOM

## 2021-11-09 PROCEDURE — 83735 ASSAY OF MAGNESIUM: CPT | Performed by: STUDENT IN AN ORGANIZED HEALTH CARE EDUCATION/TRAINING PROGRAM

## 2021-11-09 PROCEDURE — 99900031 HC PATIENT EDUCATION (STAT)

## 2021-11-09 PROCEDURE — 36415 COLL VENOUS BLD VENIPUNCTURE: CPT | Performed by: STUDENT IN AN ORGANIZED HEALTH CARE EDUCATION/TRAINING PROGRAM

## 2021-11-09 PROCEDURE — 85025 COMPLETE CBC W/AUTO DIFF WBC: CPT | Performed by: STUDENT IN AN ORGANIZED HEALTH CARE EDUCATION/TRAINING PROGRAM

## 2021-11-09 PROCEDURE — 99900035 HC TECH TIME PER 15 MIN (STAT)

## 2021-11-09 PROCEDURE — 94761 N-INVAS EAR/PLS OXIMETRY MLT: CPT

## 2021-11-09 PROCEDURE — 25000003 PHARM REV CODE 250

## 2021-11-09 RX ORDER — POTASSIUM CHLORIDE 20 MEQ/1
20 TABLET, EXTENDED RELEASE ORAL
Status: DISCONTINUED | OUTPATIENT
Start: 2021-11-09 | End: 2021-11-11 | Stop reason: HOSPADM

## 2021-11-09 RX ORDER — CHLORDIAZEPOXIDE HYDROCHLORIDE 10 MG/1
10 CAPSULE, GELATIN COATED ORAL ONCE
Status: COMPLETED | OUTPATIENT
Start: 2021-11-09 | End: 2021-11-09

## 2021-11-09 RX ORDER — HALOPERIDOL LACTATE 5 MG/ML
5 INJECTION, SOLUTION INTRAMUSCULAR ONCE
Status: COMPLETED | OUTPATIENT
Start: 2021-11-09 | End: 2021-11-09

## 2021-11-09 RX ORDER — POTASSIUM CHLORIDE 7.45 MG/ML
40 INJECTION INTRAVENOUS
Status: DISCONTINUED | OUTPATIENT
Start: 2021-11-09 | End: 2021-11-11 | Stop reason: HOSPADM

## 2021-11-09 RX ORDER — MAGNESIUM SULFATE HEPTAHYDRATE 40 MG/ML
4 INJECTION, SOLUTION INTRAVENOUS
Status: DISCONTINUED | OUTPATIENT
Start: 2021-11-09 | End: 2021-11-11 | Stop reason: HOSPADM

## 2021-11-09 RX ORDER — DEXMEDETOMIDINE HYDROCHLORIDE 4 UG/ML
0-1.4 INJECTION, SOLUTION INTRAVENOUS CONTINUOUS
Status: DISCONTINUED | OUTPATIENT
Start: 2021-11-09 | End: 2021-11-11 | Stop reason: HOSPADM

## 2021-11-09 RX ORDER — MAGNESIUM SULFATE 1 G/100ML
1 INJECTION INTRAVENOUS
Status: DISCONTINUED | OUTPATIENT
Start: 2021-11-09 | End: 2021-11-11 | Stop reason: HOSPADM

## 2021-11-09 RX ORDER — HYDRALAZINE HYDROCHLORIDE 20 MG/ML
10 INJECTION INTRAMUSCULAR; INTRAVENOUS EVERY 6 HOURS PRN
Status: DISCONTINUED | OUTPATIENT
Start: 2021-11-09 | End: 2021-11-11 | Stop reason: HOSPADM

## 2021-11-09 RX ORDER — LANOLIN ALCOHOL/MO/W.PET/CERES
800 CREAM (GRAM) TOPICAL
Status: DISCONTINUED | OUTPATIENT
Start: 2021-11-09 | End: 2021-11-11 | Stop reason: HOSPADM

## 2021-11-09 RX ORDER — FOLIC ACID 1 MG/1
1 TABLET ORAL DAILY
Status: DISCONTINUED | OUTPATIENT
Start: 2021-11-12 | End: 2021-11-11 | Stop reason: HOSPADM

## 2021-11-09 RX ORDER — THIAMINE HCL 100 MG
100 TABLET ORAL 3 TIMES DAILY
Status: DISCONTINUED | OUTPATIENT
Start: 2021-11-12 | End: 2021-11-11 | Stop reason: HOSPADM

## 2021-11-09 RX ORDER — POTASSIUM CHLORIDE 20 MEQ/1
40 TABLET, EXTENDED RELEASE ORAL
Status: DISCONTINUED | OUTPATIENT
Start: 2021-11-09 | End: 2021-11-11 | Stop reason: HOSPADM

## 2021-11-09 RX ORDER — CHLORDIAZEPOXIDE HYDROCHLORIDE 5 MG/1
25 CAPSULE, GELATIN COATED ORAL 4 TIMES DAILY
Status: DISCONTINUED | OUTPATIENT
Start: 2021-11-09 | End: 2021-11-11 | Stop reason: HOSPADM

## 2021-11-09 RX ORDER — POTASSIUM CHLORIDE 7.45 MG/ML
20 INJECTION INTRAVENOUS
Status: DISCONTINUED | OUTPATIENT
Start: 2021-11-09 | End: 2021-11-11 | Stop reason: HOSPADM

## 2021-11-09 RX ORDER — CHLORDIAZEPOXIDE HYDROCHLORIDE 5 MG/1
25 CAPSULE, GELATIN COATED ORAL 4 TIMES DAILY
Status: DISCONTINUED | OUTPATIENT
Start: 2021-11-09 | End: 2021-11-09

## 2021-11-09 RX ORDER — CHLORHEXIDINE GLUCONATE ORAL RINSE 1.2 MG/ML
15 SOLUTION DENTAL 2 TIMES DAILY
Status: DISCONTINUED | OUTPATIENT
Start: 2021-11-09 | End: 2021-11-11 | Stop reason: HOSPADM

## 2021-11-09 RX ORDER — MAGNESIUM SULFATE HEPTAHYDRATE 40 MG/ML
2 INJECTION, SOLUTION INTRAVENOUS
Status: DISCONTINUED | OUTPATIENT
Start: 2021-11-09 | End: 2021-11-11 | Stop reason: HOSPADM

## 2021-11-09 RX ORDER — MUPIROCIN 20 MG/G
OINTMENT TOPICAL 2 TIMES DAILY
Status: DISCONTINUED | OUTPATIENT
Start: 2021-11-09 | End: 2021-11-11 | Stop reason: HOSPADM

## 2021-11-09 RX ADMIN — MUPIROCIN: 20 OINTMENT TOPICAL at 08:11

## 2021-11-09 RX ADMIN — CHLORDIAZEPOXIDE HYDROCHLORIDE 10 MG: 10 CAPSULE ORAL at 05:11

## 2021-11-09 RX ADMIN — POTASSIUM CHLORIDE 40 MEQ: 1500 TABLET, EXTENDED RELEASE ORAL at 08:11

## 2021-11-09 RX ADMIN — LORAZEPAM 2 MG: 2 INJECTION INTRAMUSCULAR; INTRAVENOUS at 04:11

## 2021-11-09 RX ADMIN — PANTOPRAZOLE SODIUM 40 MG: 40 TABLET, DELAYED RELEASE ORAL at 05:11

## 2021-11-09 RX ADMIN — DEXMEDETOMIDINE HYDROCHLORIDE 1.4 MCG/KG/HR: 400 INJECTION, SOLUTION INTRAVENOUS at 03:11

## 2021-11-09 RX ADMIN — SUCRALFATE 1 G: 1 TABLET ORAL at 04:11

## 2021-11-09 RX ADMIN — CHLORDIAZEPOXIDE HYDROCHLORIDE 10 MG: 10 CAPSULE ORAL at 12:11

## 2021-11-09 RX ADMIN — DEXMEDETOMIDINE HYDROCHLORIDE 0.8 MCG/KG/HR: 400 INJECTION, SOLUTION INTRAVENOUS at 10:11

## 2021-11-09 RX ADMIN — MORPHINE SULFATE 4 MG: 4 INJECTION, SOLUTION INTRAMUSCULAR; INTRAVENOUS at 04:11

## 2021-11-09 RX ADMIN — ACETAMINOPHEN 650 MG: 325 TABLET, FILM COATED ORAL at 08:11

## 2021-11-09 RX ADMIN — SODIUM CHLORIDE, SODIUM LACTATE, POTASSIUM CHLORIDE, AND CALCIUM CHLORIDE: .6; .31; .03; .02 INJECTION, SOLUTION INTRAVENOUS at 07:11

## 2021-11-09 RX ADMIN — HALOPERIDOL LACTATE 5 MG: 5 INJECTION, SOLUTION INTRAMUSCULAR at 04:11

## 2021-11-09 RX ADMIN — LORAZEPAM 2 MG: 2 INJECTION INTRAMUSCULAR; INTRAVENOUS at 06:11

## 2021-11-09 RX ADMIN — CHLORDIAZEPOXIDE HYDROCHLORIDE 25 MG: 5 CAPSULE ORAL at 10:11

## 2021-11-09 RX ADMIN — SUCRALFATE 1 G: 1 TABLET ORAL at 10:11

## 2021-11-09 RX ADMIN — MORPHINE SULFATE 2 MG: 2 INJECTION, SOLUTION INTRAMUSCULAR; INTRAVENOUS at 10:11

## 2021-11-09 RX ADMIN — FOLIC ACID: 5 INJECTION, SOLUTION INTRAMUSCULAR; INTRAVENOUS; SUBCUTANEOUS at 09:11

## 2021-11-09 RX ADMIN — LABETALOL HYDROCHLORIDE 200 MG: 200 TABLET, FILM COATED ORAL at 08:11

## 2021-11-09 RX ADMIN — MORPHINE SULFATE 2 MG: 2 INJECTION, SOLUTION INTRAMUSCULAR; INTRAVENOUS at 05:11

## 2021-11-09 RX ADMIN — SUCRALFATE 1 G: 1 TABLET ORAL at 06:11

## 2021-11-09 RX ADMIN — CHLORHEXIDINE GLUCONATE 15 ML: 1.2 RINSE ORAL at 08:11

## 2021-11-09 RX ADMIN — CHLORDIAZEPOXIDE HYDROCHLORIDE 25 MG: 25 CAPSULE ORAL at 04:11

## 2021-11-09 RX ADMIN — LORAZEPAM 2 MG: 2 INJECTION INTRAMUSCULAR; INTRAVENOUS at 10:11

## 2021-11-10 LAB
ALBUMIN SERPL BCP-MCNC: 3.9 G/DL (ref 3.5–5.2)
ALP SERPL-CCNC: 82 U/L (ref 55–135)
ALT SERPL W/O P-5'-P-CCNC: 23 U/L (ref 10–44)
ANION GAP SERPL CALC-SCNC: 9 MMOL/L (ref 8–16)
AST SERPL-CCNC: 30 U/L (ref 10–40)
BASOPHILS # BLD AUTO: 0.01 K/UL (ref 0–0.2)
BASOPHILS NFR BLD: 0.2 % (ref 0–1.9)
BILIRUB SERPL-MCNC: 1.2 MG/DL (ref 0.1–1)
BUN SERPL-MCNC: 7 MG/DL (ref 6–20)
CALCIUM SERPL-MCNC: 8.9 MG/DL (ref 8.7–10.5)
CHLORIDE SERPL-SCNC: 100 MMOL/L (ref 95–110)
CO2 SERPL-SCNC: 26 MMOL/L (ref 23–29)
CREAT SERPL-MCNC: 0.6 MG/DL (ref 0.5–1.4)
DIFFERENTIAL METHOD: ABNORMAL
EOSINOPHIL # BLD AUTO: 0.2 K/UL (ref 0–0.5)
EOSINOPHIL NFR BLD: 2.6 % (ref 0–8)
ERYTHROCYTE [DISTWIDTH] IN BLOOD BY AUTOMATED COUNT: 12.9 % (ref 11.5–14.5)
EST. GFR  (AFRICAN AMERICAN): >60 ML/MIN/1.73 M^2
EST. GFR  (NON AFRICAN AMERICAN): >60 ML/MIN/1.73 M^2
GLUCOSE SERPL-MCNC: 116 MG/DL (ref 70–110)
HCT VFR BLD AUTO: 43.2 % (ref 40–54)
HGB BLD-MCNC: 14.9 G/DL (ref 14–18)
IMM GRANULOCYTES # BLD AUTO: 0.01 K/UL (ref 0–0.04)
IMM GRANULOCYTES NFR BLD AUTO: 0.2 % (ref 0–0.5)
LYMPHOCYTES # BLD AUTO: 0.5 K/UL (ref 1–4.8)
LYMPHOCYTES NFR BLD: 7.4 % (ref 18–48)
MAGNESIUM SERPL-MCNC: 1.8 MG/DL (ref 1.6–2.6)
MCH RBC QN AUTO: 31.8 PG (ref 27–31)
MCHC RBC AUTO-ENTMCNC: 34.5 G/DL (ref 32–36)
MCV RBC AUTO: 92 FL (ref 82–98)
MONOCYTES # BLD AUTO: 0.4 K/UL (ref 0.3–1)
MONOCYTES NFR BLD: 6 % (ref 4–15)
NEUTROPHILS # BLD AUTO: 5.2 K/UL (ref 1.8–7.7)
NEUTROPHILS NFR BLD: 83.6 % (ref 38–73)
NRBC BLD-RTO: 0 /100 WBC
PHOSPHATE SERPL-MCNC: 2.9 MG/DL (ref 2.7–4.5)
PLATELET # BLD AUTO: 147 K/UL (ref 150–450)
PMV BLD AUTO: 9.5 FL (ref 9.2–12.9)
POTASSIUM SERPL-SCNC: 3.1 MMOL/L (ref 3.5–5.1)
PROT SERPL-MCNC: 7.4 G/DL (ref 6–8.4)
RBC # BLD AUTO: 4.69 M/UL (ref 4.6–6.2)
SODIUM SERPL-SCNC: 135 MMOL/L (ref 136–145)
WBC # BLD AUTO: 6.2 K/UL (ref 3.9–12.7)

## 2021-11-10 PROCEDURE — 25000003 PHARM REV CODE 250

## 2021-11-10 PROCEDURE — 83735 ASSAY OF MAGNESIUM: CPT | Performed by: STUDENT IN AN ORGANIZED HEALTH CARE EDUCATION/TRAINING PROGRAM

## 2021-11-10 PROCEDURE — 63600175 PHARM REV CODE 636 W HCPCS: Performed by: STUDENT IN AN ORGANIZED HEALTH CARE EDUCATION/TRAINING PROGRAM

## 2021-11-10 PROCEDURE — 80053 COMPREHEN METABOLIC PANEL: CPT | Performed by: STUDENT IN AN ORGANIZED HEALTH CARE EDUCATION/TRAINING PROGRAM

## 2021-11-10 PROCEDURE — 99900035 HC TECH TIME PER 15 MIN (STAT)

## 2021-11-10 PROCEDURE — 84100 ASSAY OF PHOSPHORUS: CPT | Performed by: STUDENT IN AN ORGANIZED HEALTH CARE EDUCATION/TRAINING PROGRAM

## 2021-11-10 PROCEDURE — 25000003 PHARM REV CODE 250: Performed by: STUDENT IN AN ORGANIZED HEALTH CARE EDUCATION/TRAINING PROGRAM

## 2021-11-10 PROCEDURE — 94761 N-INVAS EAR/PLS OXIMETRY MLT: CPT

## 2021-11-10 PROCEDURE — 85025 COMPLETE CBC W/AUTO DIFF WBC: CPT | Performed by: STUDENT IN AN ORGANIZED HEALTH CARE EDUCATION/TRAINING PROGRAM

## 2021-11-10 PROCEDURE — 20000000 HC ICU ROOM

## 2021-11-10 PROCEDURE — 99900031 HC PATIENT EDUCATION (STAT)

## 2021-11-10 PROCEDURE — 36415 COLL VENOUS BLD VENIPUNCTURE: CPT | Performed by: STUDENT IN AN ORGANIZED HEALTH CARE EDUCATION/TRAINING PROGRAM

## 2021-11-10 RX ADMIN — POTASSIUM CHLORIDE 40 MEQ: 1500 TABLET, EXTENDED RELEASE ORAL at 12:11

## 2021-11-10 RX ADMIN — DEXMEDETOMIDINE HYDROCHLORIDE 0.4 MCG/KG/HR: 400 INJECTION, SOLUTION INTRAVENOUS at 03:11

## 2021-11-10 RX ADMIN — PANTOPRAZOLE SODIUM 40 MG: 40 TABLET, DELAYED RELEASE ORAL at 05:11

## 2021-11-10 RX ADMIN — SUCRALFATE 1 G: 1 TABLET ORAL at 05:11

## 2021-11-10 RX ADMIN — CHLORDIAZEPOXIDE HYDROCHLORIDE 25 MG: 5 CAPSULE ORAL at 08:11

## 2021-11-10 RX ADMIN — CHLORHEXIDINE GLUCONATE 15 ML: 1.2 RINSE ORAL at 09:11

## 2021-11-10 RX ADMIN — MAGNESIUM OXIDE 800 MG: 400 TABLET ORAL at 05:11

## 2021-11-10 RX ADMIN — CHLORDIAZEPOXIDE HYDROCHLORIDE 25 MG: 5 CAPSULE ORAL at 05:11

## 2021-11-10 RX ADMIN — POTASSIUM CHLORIDE 40 MEQ: 1500 TABLET, EXTENDED RELEASE ORAL at 05:11

## 2021-11-10 RX ADMIN — SUCRALFATE 1 G: 1 TABLET ORAL at 08:11

## 2021-11-10 RX ADMIN — LABETALOL HYDROCHLORIDE 200 MG: 200 TABLET, FILM COATED ORAL at 08:11

## 2021-11-10 RX ADMIN — LABETALOL HYDROCHLORIDE 200 MG: 200 TABLET, FILM COATED ORAL at 09:11

## 2021-11-10 RX ADMIN — CHLORDIAZEPOXIDE HYDROCHLORIDE 25 MG: 5 CAPSULE ORAL at 09:11

## 2021-11-10 RX ADMIN — ACETAMINOPHEN 650 MG: 325 TABLET, FILM COATED ORAL at 08:11

## 2021-11-10 RX ADMIN — DEXMEDETOMIDINE HYDROCHLORIDE 0.08 MCG/KG/HR: 400 INJECTION, SOLUTION INTRAVENOUS at 03:11

## 2021-11-10 RX ADMIN — MUPIROCIN: 20 OINTMENT TOPICAL at 08:11

## 2021-11-10 RX ADMIN — SUCRALFATE 1 G: 1 TABLET ORAL at 12:11

## 2021-11-10 RX ADMIN — DEXMEDETOMIDINE HYDROCHLORIDE 0.8 MCG/KG/HR: 400 INJECTION, SOLUTION INTRAVENOUS at 09:11

## 2021-11-10 RX ADMIN — CHLORHEXIDINE GLUCONATE 15 ML: 1.2 RINSE ORAL at 08:11

## 2021-11-10 RX ADMIN — FOLIC ACID: 5 INJECTION, SOLUTION INTRAMUSCULAR; INTRAVENOUS; SUBCUTANEOUS at 09:11

## 2021-11-10 RX ADMIN — CHLORDIAZEPOXIDE HYDROCHLORIDE 25 MG: 5 CAPSULE ORAL at 12:11

## 2021-11-10 RX ADMIN — MUPIROCIN: 20 OINTMENT TOPICAL at 09:11

## 2021-11-11 VITALS
TEMPERATURE: 98 F | SYSTOLIC BLOOD PRESSURE: 112 MMHG | RESPIRATION RATE: 23 BRPM | HEIGHT: 64 IN | DIASTOLIC BLOOD PRESSURE: 84 MMHG | HEART RATE: 79 BPM | OXYGEN SATURATION: 98 % | WEIGHT: 140 LBS | BODY MASS INDEX: 23.9 KG/M2

## 2021-11-11 LAB
ALBUMIN SERPL BCP-MCNC: 3.8 G/DL (ref 3.5–5.2)
ALP SERPL-CCNC: 74 U/L (ref 55–135)
ALT SERPL W/O P-5'-P-CCNC: 22 U/L (ref 10–44)
ANION GAP SERPL CALC-SCNC: 11 MMOL/L (ref 8–16)
AST SERPL-CCNC: 31 U/L (ref 10–40)
BASOPHILS # BLD AUTO: 0.02 K/UL (ref 0–0.2)
BASOPHILS NFR BLD: 0.3 % (ref 0–1.9)
BILIRUB SERPL-MCNC: 1 MG/DL (ref 0.1–1)
BUN SERPL-MCNC: 8 MG/DL (ref 6–20)
CALCIUM SERPL-MCNC: 9 MG/DL (ref 8.7–10.5)
CHLORIDE SERPL-SCNC: 103 MMOL/L (ref 95–110)
CO2 SERPL-SCNC: 25 MMOL/L (ref 23–29)
CREAT SERPL-MCNC: 0.7 MG/DL (ref 0.5–1.4)
DIFFERENTIAL METHOD: ABNORMAL
EOSINOPHIL # BLD AUTO: 0.3 K/UL (ref 0–0.5)
EOSINOPHIL NFR BLD: 4.1 % (ref 0–8)
ERYTHROCYTE [DISTWIDTH] IN BLOOD BY AUTOMATED COUNT: 13.2 % (ref 11.5–14.5)
EST. GFR  (AFRICAN AMERICAN): >60 ML/MIN/1.73 M^2
EST. GFR  (NON AFRICAN AMERICAN): >60 ML/MIN/1.73 M^2
GLUCOSE SERPL-MCNC: 107 MG/DL (ref 70–110)
HCT VFR BLD AUTO: 41.9 % (ref 40–54)
HGB BLD-MCNC: 13.9 G/DL (ref 14–18)
IMM GRANULOCYTES # BLD AUTO: 0.02 K/UL (ref 0–0.04)
IMM GRANULOCYTES NFR BLD AUTO: 0.3 % (ref 0–0.5)
LYMPHOCYTES # BLD AUTO: 1 K/UL (ref 1–4.8)
LYMPHOCYTES NFR BLD: 15.3 % (ref 18–48)
MAGNESIUM SERPL-MCNC: 1.8 MG/DL (ref 1.6–2.6)
MCH RBC QN AUTO: 31.1 PG (ref 27–31)
MCHC RBC AUTO-ENTMCNC: 33.2 G/DL (ref 32–36)
MCV RBC AUTO: 94 FL (ref 82–98)
MONOCYTES # BLD AUTO: 0.6 K/UL (ref 0.3–1)
MONOCYTES NFR BLD: 9 % (ref 4–15)
NEUTROPHILS # BLD AUTO: 4.5 K/UL (ref 1.8–7.7)
NEUTROPHILS NFR BLD: 71 % (ref 38–73)
NRBC BLD-RTO: 0 /100 WBC
PHOSPHATE SERPL-MCNC: 3.4 MG/DL (ref 2.7–4.5)
PLATELET # BLD AUTO: 159 K/UL (ref 150–450)
PMV BLD AUTO: 9.4 FL (ref 9.2–12.9)
POTASSIUM SERPL-SCNC: 3.8 MMOL/L (ref 3.5–5.1)
PROT SERPL-MCNC: 7.6 G/DL (ref 6–8.4)
RBC # BLD AUTO: 4.47 M/UL (ref 4.6–6.2)
SODIUM SERPL-SCNC: 139 MMOL/L (ref 136–145)
WBC # BLD AUTO: 6.35 K/UL (ref 3.9–12.7)

## 2021-11-11 PROCEDURE — 99900035 HC TECH TIME PER 15 MIN (STAT)

## 2021-11-11 PROCEDURE — 25000003 PHARM REV CODE 250: Performed by: STUDENT IN AN ORGANIZED HEALTH CARE EDUCATION/TRAINING PROGRAM

## 2021-11-11 PROCEDURE — 25000003 PHARM REV CODE 250

## 2021-11-11 PROCEDURE — 84100 ASSAY OF PHOSPHORUS: CPT | Performed by: STUDENT IN AN ORGANIZED HEALTH CARE EDUCATION/TRAINING PROGRAM

## 2021-11-11 PROCEDURE — 94761 N-INVAS EAR/PLS OXIMETRY MLT: CPT

## 2021-11-11 PROCEDURE — 83735 ASSAY OF MAGNESIUM: CPT | Performed by: STUDENT IN AN ORGANIZED HEALTH CARE EDUCATION/TRAINING PROGRAM

## 2021-11-11 PROCEDURE — 80053 COMPREHEN METABOLIC PANEL: CPT | Performed by: STUDENT IN AN ORGANIZED HEALTH CARE EDUCATION/TRAINING PROGRAM

## 2021-11-11 PROCEDURE — 36415 COLL VENOUS BLD VENIPUNCTURE: CPT | Performed by: STUDENT IN AN ORGANIZED HEALTH CARE EDUCATION/TRAINING PROGRAM

## 2021-11-11 PROCEDURE — 85025 COMPLETE CBC W/AUTO DIFF WBC: CPT | Performed by: STUDENT IN AN ORGANIZED HEALTH CARE EDUCATION/TRAINING PROGRAM

## 2021-11-11 PROCEDURE — 63600175 PHARM REV CODE 636 W HCPCS: Performed by: STUDENT IN AN ORGANIZED HEALTH CARE EDUCATION/TRAINING PROGRAM

## 2021-11-11 RX ORDER — PANTOPRAZOLE SODIUM 40 MG/1
40 TABLET, DELAYED RELEASE ORAL DAILY
Qty: 90 TABLET | Refills: 0 | OUTPATIENT
Start: 2021-11-11 | End: 2022-03-04

## 2021-11-11 RX ORDER — LABETALOL 200 MG/1
200 TABLET, FILM COATED ORAL EVERY 12 HOURS
Qty: 60 TABLET | Refills: 2 | Status: ON HOLD | OUTPATIENT
Start: 2021-11-11 | End: 2023-10-12

## 2021-11-11 RX ORDER — FOLIC ACID 1 MG/1
1 TABLET ORAL DAILY
Qty: 30 TABLET | Refills: 2 | Status: SHIPPED | OUTPATIENT
Start: 2021-11-12 | End: 2021-12-05 | Stop reason: SDUPTHER

## 2021-11-11 RX ORDER — CHLORDIAZEPOXIDE HYDROCHLORIDE 25 MG/1
25 CAPSULE, GELATIN COATED ORAL 4 TIMES DAILY
Qty: 40 CAPSULE | Refills: 0 | Status: SHIPPED | OUTPATIENT
Start: 2021-11-11 | End: 2022-04-22

## 2021-11-11 RX ORDER — LANOLIN ALCOHOL/MO/W.PET/CERES
100 CREAM (GRAM) TOPICAL 3 TIMES DAILY
Qty: 90 TABLET | Refills: 0 | Status: ON HOLD | OUTPATIENT
Start: 2021-11-12 | End: 2023-10-12

## 2021-11-11 RX ADMIN — POTASSIUM CHLORIDE 20 MEQ: 1500 TABLET, EXTENDED RELEASE ORAL at 05:11

## 2021-11-11 RX ADMIN — SUCRALFATE 1 G: 1 TABLET ORAL at 05:11

## 2021-11-11 RX ADMIN — CHLORHEXIDINE GLUCONATE 15 ML: 1.2 RINSE ORAL at 09:11

## 2021-11-11 RX ADMIN — MUPIROCIN: 20 OINTMENT TOPICAL at 09:11

## 2021-11-11 RX ADMIN — PANTOPRAZOLE SODIUM 40 MG: 40 TABLET, DELAYED RELEASE ORAL at 05:11

## 2021-11-11 RX ADMIN — LORAZEPAM 2 MG: 2 INJECTION INTRAMUSCULAR; INTRAVENOUS at 01:11

## 2021-11-11 RX ADMIN — MAGNESIUM OXIDE 800 MG: 400 TABLET ORAL at 05:11

## 2021-11-11 RX ADMIN — CHLORDIAZEPOXIDE HYDROCHLORIDE 25 MG: 5 CAPSULE ORAL at 09:11

## 2021-11-11 RX ADMIN — LABETALOL HYDROCHLORIDE 200 MG: 200 TABLET, FILM COATED ORAL at 09:11

## 2021-12-04 ENCOUNTER — HOSPITAL ENCOUNTER (EMERGENCY)
Facility: HOSPITAL | Age: 36
Discharge: HOME | End: 2021-12-05
Attending: EMERGENCY MEDICINE
Payer: OTHER GOVERNMENT

## 2021-12-04 DIAGNOSIS — R51.9 ACUTE NONINTRACTABLE HEADACHE, UNSPECIFIED HEADACHE TYPE: Primary | ICD-10-CM

## 2021-12-04 DIAGNOSIS — R07.9 CHEST PAIN: ICD-10-CM

## 2021-12-04 LAB
ALBUMIN SERPL BCP-MCNC: 4.2 G/DL (ref 3.5–5.2)
ALP SERPL-CCNC: 64 U/L (ref 55–135)
ALT SERPL W/O P-5'-P-CCNC: 24 U/L (ref 10–44)
ANION GAP SERPL CALC-SCNC: 10 MMOL/L (ref 8–16)
AST SERPL-CCNC: 27 U/L (ref 10–40)
BASOPHILS # BLD AUTO: 0.05 K/UL (ref 0–0.2)
BASOPHILS NFR BLD: 0.7 % (ref 0–1.9)
BILIRUB SERPL-MCNC: 0.7 MG/DL (ref 0.1–1)
BNP SERPL-MCNC: 111 PG/ML (ref 0–99)
BUN SERPL-MCNC: 9 MG/DL (ref 6–20)
CALCIUM SERPL-MCNC: 9 MG/DL (ref 8.7–10.5)
CHLORIDE SERPL-SCNC: 104 MMOL/L (ref 95–110)
CO2 SERPL-SCNC: 26 MMOL/L (ref 23–29)
CREAT SERPL-MCNC: 0.8 MG/DL (ref 0.5–1.4)
D DIMER PPP IA.FEU-MCNC: 0.32 UG/ML FEU
DIFFERENTIAL METHOD: ABNORMAL
EOSINOPHIL # BLD AUTO: 0.3 K/UL (ref 0–0.5)
EOSINOPHIL NFR BLD: 3.6 % (ref 0–8)
ERYTHROCYTE [DISTWIDTH] IN BLOOD BY AUTOMATED COUNT: 13.2 % (ref 11.5–14.5)
EST. GFR  (AFRICAN AMERICAN): >60 ML/MIN/1.73 M^2
EST. GFR  (NON AFRICAN AMERICAN): >60 ML/MIN/1.73 M^2
GLUCOSE SERPL-MCNC: 106 MG/DL (ref 70–110)
HCT VFR BLD AUTO: 40.6 % (ref 40–54)
HGB BLD-MCNC: 13.5 G/DL (ref 14–18)
IMM GRANULOCYTES # BLD AUTO: 0.02 K/UL (ref 0–0.04)
IMM GRANULOCYTES NFR BLD AUTO: 0.3 % (ref 0–0.5)
LIPASE SERPL-CCNC: 94 U/L (ref 4–60)
LYMPHOCYTES # BLD AUTO: 1.9 K/UL (ref 1–4.8)
LYMPHOCYTES NFR BLD: 25.3 % (ref 18–48)
MCH RBC QN AUTO: 31 PG (ref 27–31)
MCHC RBC AUTO-ENTMCNC: 33.3 G/DL (ref 32–36)
MCV RBC AUTO: 93 FL (ref 82–98)
MONOCYTES # BLD AUTO: 0.9 K/UL (ref 0.3–1)
MONOCYTES NFR BLD: 11.3 % (ref 4–15)
NEUTROPHILS # BLD AUTO: 4.5 K/UL (ref 1.8–7.7)
NEUTROPHILS NFR BLD: 58.8 % (ref 38–73)
NRBC BLD-RTO: 0 /100 WBC
PLATELET # BLD AUTO: 351 K/UL (ref 150–450)
PMV BLD AUTO: 10.3 FL (ref 9.2–12.9)
POTASSIUM SERPL-SCNC: 2.9 MMOL/L (ref 3.5–5.1)
PROT SERPL-MCNC: 7.1 G/DL (ref 6–8.4)
RBC # BLD AUTO: 4.36 M/UL (ref 4.6–6.2)
SARS-COV-2 RDRP RESP QL NAA+PROBE: NEGATIVE
SODIUM SERPL-SCNC: 140 MMOL/L (ref 136–145)
TROPONIN I SERPL DL<=0.01 NG/ML-MCNC: <0.03 NG/ML
WBC # BLD AUTO: 7.55 K/UL (ref 3.9–12.7)

## 2021-12-04 PROCEDURE — 96374 THER/PROPH/DIAG INJ IV PUSH: CPT

## 2021-12-04 PROCEDURE — 85025 COMPLETE CBC W/AUTO DIFF WBC: CPT | Performed by: STUDENT IN AN ORGANIZED HEALTH CARE EDUCATION/TRAINING PROGRAM

## 2021-12-04 PROCEDURE — 85379 FIBRIN DEGRADATION QUANT: CPT | Performed by: STUDENT IN AN ORGANIZED HEALTH CARE EDUCATION/TRAINING PROGRAM

## 2021-12-04 PROCEDURE — 93010 ELECTROCARDIOGRAM REPORT: CPT | Mod: ,,, | Performed by: INTERNAL MEDICINE

## 2021-12-04 PROCEDURE — 84484 ASSAY OF TROPONIN QUANT: CPT | Performed by: STUDENT IN AN ORGANIZED HEALTH CARE EDUCATION/TRAINING PROGRAM

## 2021-12-04 PROCEDURE — 36415 COLL VENOUS BLD VENIPUNCTURE: CPT | Performed by: STUDENT IN AN ORGANIZED HEALTH CARE EDUCATION/TRAINING PROGRAM

## 2021-12-04 PROCEDURE — 83880 ASSAY OF NATRIURETIC PEPTIDE: CPT | Performed by: STUDENT IN AN ORGANIZED HEALTH CARE EDUCATION/TRAINING PROGRAM

## 2021-12-04 PROCEDURE — U0002 COVID-19 LAB TEST NON-CDC: HCPCS | Performed by: STUDENT IN AN ORGANIZED HEALTH CARE EDUCATION/TRAINING PROGRAM

## 2021-12-04 PROCEDURE — 93005 ELECTROCARDIOGRAM TRACING: CPT | Performed by: INTERNAL MEDICINE

## 2021-12-04 PROCEDURE — 25000003 PHARM REV CODE 250: Performed by: STUDENT IN AN ORGANIZED HEALTH CARE EDUCATION/TRAINING PROGRAM

## 2021-12-04 PROCEDURE — 93010 EKG 12-LEAD: ICD-10-PCS | Mod: ,,, | Performed by: INTERNAL MEDICINE

## 2021-12-04 PROCEDURE — 96375 TX/PRO/DX INJ NEW DRUG ADDON: CPT

## 2021-12-04 PROCEDURE — 80053 COMPREHEN METABOLIC PANEL: CPT | Performed by: STUDENT IN AN ORGANIZED HEALTH CARE EDUCATION/TRAINING PROGRAM

## 2021-12-04 PROCEDURE — 83690 ASSAY OF LIPASE: CPT | Performed by: STUDENT IN AN ORGANIZED HEALTH CARE EDUCATION/TRAINING PROGRAM

## 2021-12-04 PROCEDURE — 99284 EMERGENCY DEPT VISIT MOD MDM: CPT | Mod: 25

## 2021-12-04 RX ORDER — ASPIRIN 325 MG
325 TABLET ORAL
Status: COMPLETED | OUTPATIENT
Start: 2021-12-04 | End: 2021-12-04

## 2021-12-04 RX ADMIN — ASPIRIN 325 MG ORAL TABLET 325 MG: 325 PILL ORAL at 11:12

## 2021-12-04 RX ADMIN — POTASSIUM BICARBONATE 50 MEQ: 391 TABLET, EFFERVESCENT ORAL at 11:12

## 2021-12-05 VITALS
RESPIRATION RATE: 18 BRPM | OXYGEN SATURATION: 99 % | DIASTOLIC BLOOD PRESSURE: 56 MMHG | TEMPERATURE: 99 F | SYSTOLIC BLOOD PRESSURE: 114 MMHG | WEIGHT: 139 LBS | HEIGHT: 64 IN | HEART RATE: 71 BPM | BODY MASS INDEX: 23.73 KG/M2

## 2021-12-05 LAB
INFLUENZA A, MOLECULAR: NEGATIVE
INFLUENZA B, MOLECULAR: NEGATIVE
SPECIMEN SOURCE: NORMAL
TROPONIN I SERPL DL<=0.01 NG/ML-MCNC: <0.03 NG/ML

## 2021-12-05 PROCEDURE — 87502 INFLUENZA DNA AMP PROBE: CPT | Performed by: STUDENT IN AN ORGANIZED HEALTH CARE EDUCATION/TRAINING PROGRAM

## 2021-12-05 PROCEDURE — 63600175 PHARM REV CODE 636 W HCPCS: Performed by: STUDENT IN AN ORGANIZED HEALTH CARE EDUCATION/TRAINING PROGRAM

## 2021-12-05 PROCEDURE — 63600175 PHARM REV CODE 636 W HCPCS: Performed by: EMERGENCY MEDICINE

## 2021-12-05 PROCEDURE — 25000003 PHARM REV CODE 250: Performed by: STUDENT IN AN ORGANIZED HEALTH CARE EDUCATION/TRAINING PROGRAM

## 2021-12-05 PROCEDURE — 84484 ASSAY OF TROPONIN QUANT: CPT | Performed by: STUDENT IN AN ORGANIZED HEALTH CARE EDUCATION/TRAINING PROGRAM

## 2021-12-05 RX ORDER — FOLIC ACID 1 MG/1
1 TABLET ORAL DAILY
Qty: 30 TABLET | Refills: 2 | Status: ON HOLD | OUTPATIENT
Start: 2021-12-05 | End: 2023-10-12

## 2021-12-05 RX ORDER — DIPHENHYDRAMINE HCL 25 MG
25 CAPSULE ORAL
Status: DISCONTINUED | OUTPATIENT
Start: 2021-12-05 | End: 2021-12-05

## 2021-12-05 RX ORDER — DIPHENHYDRAMINE HYDROCHLORIDE 50 MG/ML
12.5 INJECTION INTRAMUSCULAR; INTRAVENOUS
Status: COMPLETED | OUTPATIENT
Start: 2021-12-05 | End: 2021-12-05

## 2021-12-05 RX ORDER — PROCHLORPERAZINE EDISYLATE 5 MG/ML
10 INJECTION INTRAMUSCULAR; INTRAVENOUS
Status: COMPLETED | OUTPATIENT
Start: 2021-12-05 | End: 2021-12-05

## 2021-12-05 RX ADMIN — FOLIC ACID: 5 INJECTION, SOLUTION INTRAMUSCULAR; INTRAVENOUS; SUBCUTANEOUS at 01:12

## 2021-12-05 RX ADMIN — PROCHLORPERAZINE EDISYLATE 10 MG: 5 INJECTION INTRAMUSCULAR; INTRAVENOUS at 12:12

## 2021-12-05 RX ADMIN — DIPHENHYDRAMINE HYDROCHLORIDE 12.5 MG: 50 INJECTION INTRAMUSCULAR; INTRAVENOUS at 12:12

## 2021-12-29 ENCOUNTER — HOSPITAL ENCOUNTER (EMERGENCY)
Facility: HOSPITAL | Age: 36
Discharge: HOME | End: 2021-12-31
Attending: EMERGENCY MEDICINE
Payer: OTHER GOVERNMENT

## 2021-12-29 DIAGNOSIS — R07.9 CHEST PAIN: ICD-10-CM

## 2021-12-29 DIAGNOSIS — F22 DELUSIONAL DISORDER: ICD-10-CM

## 2021-12-29 DIAGNOSIS — F29 PSYCHOSIS, UNSPECIFIED PSYCHOSIS TYPE: Primary | ICD-10-CM

## 2021-12-29 DIAGNOSIS — F10.10 ALCOHOL ABUSE: ICD-10-CM

## 2021-12-29 LAB
ALBUMIN SERPL BCP-MCNC: 4.6 G/DL (ref 3.5–5.2)
ALP SERPL-CCNC: 80 U/L (ref 55–135)
ALT SERPL W/O P-5'-P-CCNC: 23 U/L (ref 10–44)
AMPHET+METHAMPHET UR QL: NEGATIVE
ANION GAP SERPL CALC-SCNC: 16 MMOL/L (ref 8–16)
APAP SERPL-MCNC: <10 UG/ML (ref 10–20)
AST SERPL-CCNC: 27 U/L (ref 10–40)
BARBITURATES UR QL SCN>200 NG/ML: NEGATIVE
BASOPHILS # BLD AUTO: 0.03 K/UL (ref 0–0.2)
BASOPHILS NFR BLD: 0.5 % (ref 0–1.9)
BENZODIAZ UR QL SCN>200 NG/ML: NEGATIVE
BILIRUB SERPL-MCNC: 0.9 MG/DL (ref 0.1–1)
BILIRUB UR QL STRIP: NEGATIVE
BUN SERPL-MCNC: 9 MG/DL (ref 6–20)
BZE UR QL SCN: NEGATIVE
CALCIUM SERPL-MCNC: 9.1 MG/DL (ref 8.7–10.5)
CANNABINOIDS UR QL SCN: NEGATIVE
CHLORIDE SERPL-SCNC: 99 MMOL/L (ref 95–110)
CLARITY UR: CLEAR
CO2 SERPL-SCNC: 25 MMOL/L (ref 23–29)
COLOR UR: YELLOW
CREAT SERPL-MCNC: 0.6 MG/DL (ref 0.5–1.4)
CREAT UR-MCNC: 66 MG/DL (ref 23–375)
DIFFERENTIAL METHOD: ABNORMAL
EOSINOPHIL # BLD AUTO: 0 K/UL (ref 0–0.5)
EOSINOPHIL NFR BLD: 0.7 % (ref 0–8)
ERYTHROCYTE [DISTWIDTH] IN BLOOD BY AUTOMATED COUNT: 12.7 % (ref 11.5–14.5)
EST. GFR  (AFRICAN AMERICAN): >60 ML/MIN/1.73 M^2
EST. GFR  (NON AFRICAN AMERICAN): >60 ML/MIN/1.73 M^2
ETHANOL SERPL-MCNC: 182 MG/DL
ETHANOL SERPL-MCNC: 226 MG/DL
GLUCOSE SERPL-MCNC: 123 MG/DL (ref 70–110)
GLUCOSE UR QL STRIP: NEGATIVE
HCT VFR BLD AUTO: 43.3 % (ref 40–54)
HGB BLD-MCNC: 15.3 G/DL (ref 14–18)
HGB UR QL STRIP: NEGATIVE
IMM GRANULOCYTES # BLD AUTO: 0.01 K/UL (ref 0–0.04)
IMM GRANULOCYTES NFR BLD AUTO: 0.2 % (ref 0–0.5)
KETONES UR QL STRIP: NEGATIVE
LEUKOCYTE ESTERASE UR QL STRIP: NEGATIVE
LYMPHOCYTES # BLD AUTO: 2.8 K/UL (ref 1–4.8)
LYMPHOCYTES NFR BLD: 48.7 % (ref 18–48)
MCH RBC QN AUTO: 30 PG (ref 27–31)
MCHC RBC AUTO-ENTMCNC: 35.3 G/DL (ref 32–36)
MCV RBC AUTO: 85 FL (ref 82–98)
MONOCYTES # BLD AUTO: 0.4 K/UL (ref 0.3–1)
MONOCYTES NFR BLD: 7 % (ref 4–15)
NEUTROPHILS # BLD AUTO: 2.5 K/UL (ref 1.8–7.7)
NEUTROPHILS NFR BLD: 42.9 % (ref 38–73)
NITRITE UR QL STRIP: NEGATIVE
NRBC BLD-RTO: 0 /100 WBC
OPIATES UR QL SCN: NEGATIVE
PCP UR QL SCN>25 NG/ML: NEGATIVE
PH UR STRIP: 8 [PH] (ref 5–8)
PLATELET # BLD AUTO: 422 K/UL (ref 150–450)
PMV BLD AUTO: 8.8 FL (ref 9.2–12.9)
POTASSIUM SERPL-SCNC: 3.6 MMOL/L (ref 3.5–5.1)
PROT SERPL-MCNC: 8.5 G/DL (ref 6–8.4)
PROT UR QL STRIP: ABNORMAL
RBC # BLD AUTO: 5.1 M/UL (ref 4.6–6.2)
SALICYLATES SERPL-MCNC: <4 MG/DL (ref 15–30)
SARS-COV-2 RDRP RESP QL NAA+PROBE: NEGATIVE
SODIUM SERPL-SCNC: 140 MMOL/L (ref 136–145)
SP GR UR STRIP: 1.01 (ref 1–1.03)
TOXICOLOGY INFORMATION: NORMAL
TROPONIN I SERPL DL<=0.01 NG/ML-MCNC: <0.03 NG/ML
TSH SERPL DL<=0.005 MIU/L-ACNC: 1.76 UIU/ML (ref 0.34–5.6)
URN SPEC COLLECT METH UR: ABNORMAL
UROBILINOGEN UR STRIP-ACNC: NEGATIVE EU/DL
WBC # BLD AUTO: 5.75 K/UL (ref 3.9–12.7)

## 2021-12-29 PROCEDURE — 84443 ASSAY THYROID STIM HORMONE: CPT | Performed by: EMERGENCY MEDICINE

## 2021-12-29 PROCEDURE — 85025 COMPLETE CBC W/AUTO DIFF WBC: CPT | Performed by: EMERGENCY MEDICINE

## 2021-12-29 PROCEDURE — 80143 DRUG ASSAY ACETAMINOPHEN: CPT | Performed by: EMERGENCY MEDICINE

## 2021-12-29 PROCEDURE — 84484 ASSAY OF TROPONIN QUANT: CPT | Performed by: EMERGENCY MEDICINE

## 2021-12-29 PROCEDURE — 93010 EKG 12-LEAD: ICD-10-PCS | Mod: ,,, | Performed by: INTERNAL MEDICINE

## 2021-12-29 PROCEDURE — 80307 DRUG TEST PRSMV CHEM ANLYZR: CPT | Performed by: EMERGENCY MEDICINE

## 2021-12-29 PROCEDURE — 93010 ELECTROCARDIOGRAM REPORT: CPT | Mod: ,,, | Performed by: INTERNAL MEDICINE

## 2021-12-29 PROCEDURE — 81003 URINALYSIS AUTO W/O SCOPE: CPT | Mod: 59 | Performed by: EMERGENCY MEDICINE

## 2021-12-29 PROCEDURE — 80053 COMPREHEN METABOLIC PANEL: CPT | Performed by: EMERGENCY MEDICINE

## 2021-12-29 PROCEDURE — 93005 ELECTROCARDIOGRAM TRACING: CPT | Performed by: INTERNAL MEDICINE

## 2021-12-29 PROCEDURE — 80179 DRUG ASSAY SALICYLATE: CPT | Performed by: EMERGENCY MEDICINE

## 2021-12-29 PROCEDURE — 25000003 PHARM REV CODE 250: Performed by: EMERGENCY MEDICINE

## 2021-12-29 PROCEDURE — U0002 COVID-19 LAB TEST NON-CDC: HCPCS | Performed by: PHYSICIAN ASSISTANT

## 2021-12-29 PROCEDURE — 99285 EMERGENCY DEPT VISIT HI MDM: CPT | Mod: 25

## 2021-12-29 PROCEDURE — 82077 ASSAY SPEC XCP UR&BREATH IA: CPT | Performed by: EMERGENCY MEDICINE

## 2021-12-29 RX ORDER — QUETIAPINE FUMARATE 100 MG/1
200 TABLET, FILM COATED ORAL ONCE
Status: COMPLETED | OUTPATIENT
Start: 2021-12-29 | End: 2021-12-29

## 2021-12-29 RX ADMIN — QUETIAPINE 200 MG: 100 TABLET ORAL at 08:12

## 2021-12-30 LAB — ETHANOL SERPL-MCNC: 97 MG/DL

## 2021-12-30 PROCEDURE — 82077 ASSAY SPEC XCP UR&BREATH IA: CPT | Performed by: EMERGENCY MEDICINE

## 2021-12-30 NOTE — PLAN OF CARE
Called [phone] to [facility] and spoke with Cipriano to inquire if they are willing to receive  patient that requires .  He informed me that they can and to fax referral.  Faxed referral to [phone].

## 2021-12-30 NOTE — PLAN OF CARE
12/30/21 1048   Post-Acute Status   Post-Acute Authorization Placement  (Psych placement- P[facility])   Post-Acute Placement Status Referrals Sent   Discharge Plan   Discharge Plan A Psychiatric hospital   Discharge Plan B Psychiatric hospital     Referral sent to the following facilities:   [facility] [place]  [facility]  [facility]

## 2021-12-30 NOTE — ED NOTES
"Patient complains of headache and numbness to bilat upper arms. Patient states that he had an episode with a "bad spirit" in his head approx 5 months ago, but has been praying and has resolved.  Denies auditory or visual hallucinations, suicidal or homicidal ideations.    "

## 2021-12-30 NOTE — ED NOTES
Resumed pt care. 36 YOM currently denies any complaints. Stated presented to ED requesting psychiatric consult. Currently denies SI/HI or AVH. Denies daily ETOH use. Denies any other complaints. PEC at bedside. Sitter at bedside. Pt in scrubs. All medical equipment removed from room.     Adult Physical Assessment  LOC: Patient is awake, alert, oriented and speaking appropriately at this time.  APPEARANCE: Patient resting comfortably and appears to be in no acute distress at this time. Patient is clean and well groomed, patient's clothing is properly fastened.  SKIN:The skin is warm and dry, color consistent with ethnicity, patient has normal skin turgor and moist mucus membranes, skin intact, no breakdown or brusing noted.  MUSCULOSKELETAL: Patient moving all extremities well, no obvious swelling or deformities noted.  RESPIRATORY: Airway is open and patent, respirations are spontaneous, patient has a normal effort and rate, no accessory muscle use noted.  CARDIAC: Patient has a normal rate and rhythm, no periphreal edema noted in any extremity, capillary refill < 3 seconds in all extremities.  ABDOMEN: Soft and non tender to palpation, no abdominal distention noted.  NEUROLOGIC: Eyes open spontaneously, behavior appropriate to situation, follows commands, facial expression symmetrical, bilateral hand grasp equal and even, purposeful motor response noted, normal sensation in all extremities when touched with a finger.      VSS. ED workup in progress. Call light within pt reach. Safety measures in place: side rails up X2. Will continue to monitor.

## 2021-12-30 NOTE — ED PROVIDER NOTES
Encounter Date: 12/29/2021       History     Chief Complaint   Patient presents with    Chest Pain     Brain hurts - states feels pain in his brain     This is a 36-year-old male who presents emergency department with headache and with chest pain and with a psychological problem.  History was obtained from the patient using Thai language Maricarmen  WL 1262.  Patient states that his brain is been hurting him for about 6 months.  He says that he thinks that a spirit has entered into his brain and he has been having issues with fogginess and headaches ever since.  He says that he also feels palpitations in his heart and he occasionally has chest pain as well off and on for the last 6 months.  Nothing seems to bring it on or make it better or worse.  He says that he thinks that it is a psychological problem.  He says that he may benefit from evaluation by psychiatrist.  He says he has had this in the past.  He is supposed to be taking medication but he has not been taking any of his medicines.        Review of patient's allergies indicates:  No Known Allergies  Past Medical History:   Diagnosis Date    Alcohol abuse      No past surgical history on file.  No family history on file.  Social History     Substance Use Topics    Alcohol use: Yes    Drug use: Never     Review of Systems   Constitutional: Negative for chills and fever.   HENT: Negative for sore throat.    Respiratory: Negative for shortness of breath.    Cardiovascular: Positive for chest pain.   Gastrointestinal: Negative for nausea and vomiting.   Genitourinary: Negative for dysuria.   Musculoskeletal: Negative for back pain.   Skin: Negative for rash.   Neurological: Positive for headaches. Negative for weakness.   Hematological: Does not bruise/bleed easily.   Psychiatric/Behavioral: Negative for confusion.   All other systems reviewed and are negative.      Physical Exam     Initial Vitals [12/29/21 1627]   BP Pulse Resp Temp SpO2   (!)  145/76 88 (!) 24 98 °F (36.7 °C) 99 %      MAP       --         Physical Exam    Nursing note and vitals reviewed.  Constitutional: He appears well-developed and well-nourished. He is not diaphoretic. No distress.   HENT:   Head: Normocephalic and atraumatic.   Mouth/Throat: Oropharynx is clear and moist. No oropharyngeal exudate.   Eyes: Conjunctivae and EOM are normal. Pupils are equal, round, and reactive to light.   Neck: Neck supple. No tracheal deviation present.   Cardiovascular: Normal rate, regular rhythm, normal heart sounds and intact distal pulses.   No murmur heard.  Pulmonary/Chest: Breath sounds normal. No stridor. No respiratory distress. He has no wheezes. He has no rhonchi. He has no rales.   Abdominal: Abdomen is soft. Bowel sounds are normal. He exhibits no distension. There is no abdominal tenderness. There is no rebound and no guarding.   Musculoskeletal:         General: No tenderness or edema. Normal range of motion.      Cervical back: Neck supple.     Neurological: He is alert and oriented to person, place, and time. He has normal strength and normal reflexes. No cranial nerve deficit or sensory deficit.   Speech is normal, GCS 15.  Nonfocal neurological exam.  Normal finger-to-nose.  Normal heel-to-shin.   Skin: Skin is warm and dry. Capillary refill takes less than 2 seconds. No rash noted. No erythema. No pallor.   Psychiatric: He has a normal mood and affect. His behavior is normal. Thought content normal.         ED Course   Procedures  Labs Reviewed   CBC W/ AUTO DIFFERENTIAL - Abnormal; Notable for the following components:       Result Value    MPV 8.8 (*)     Lymph % 48.7 (*)     All other components within normal limits   COMPREHENSIVE METABOLIC PANEL - Abnormal; Notable for the following components:    Glucose 123 (*)     Total Protein 8.5 (*)     All other components within normal limits   URINALYSIS, REFLEX TO URINE CULTURE - Abnormal; Notable for the following components:     Protein, UA Trace (*)     All other components within normal limits    Narrative:     Specimen Source->Urine   ALCOHOL,MEDICAL (ETHANOL) - Abnormal; Notable for the following components:    Alcohol, Serum 226 (*)     All other components within normal limits   SALICYLATE LEVEL - Abnormal; Notable for the following components:    Salicylate Lvl <4.0 (*)     All other components within normal limits   ALCOHOL,MEDICAL (ETHANOL) - Abnormal; Notable for the following components:    Alcohol, Serum 182 (*)     All other components within normal limits   ALCOHOL,MEDICAL (ETHANOL) - Abnormal; Notable for the following components:    Alcohol, Serum 97 (*)     All other components within normal limits   SARS-COV-2 RNA AMPLIFICATION, QUAL   TSH   DRUG SCREEN PANEL, URINE EMERGENCY    Narrative:     Specimen Source->Urine   ACETAMINOPHEN LEVEL   TROPONIN I   TROPONIN I     Results for orders placed or performed during the hospital encounter of 12/29/21   COVID-19 Rapid Screening   Result Value Ref Range    SARS-CoV-2 RNA, Amplification, Qual Negative Negative   CBC auto differential   Result Value Ref Range    WBC 5.75 3.90 - 12.70 K/uL    RBC 5.10 4.60 - 6.20 M/uL    Hemoglobin 15.3 14.0 - 18.0 g/dL    Hematocrit 43.3 40.0 - 54.0 %    MCV 85 82 - 98 fL    MCH 30.0 27.0 - 31.0 pg    MCHC 35.3 32.0 - 36.0 g/dL    RDW 12.7 11.5 - 14.5 %    Platelets 422 150 - 450 K/uL    MPV 8.8 (L) 9.2 - 12.9 fL    Immature Granulocytes 0.2 0.0 - 0.5 %    Gran # (ANC) 2.5 1.8 - 7.7 K/uL    Immature Grans (Abs) 0.01 0.00 - 0.04 K/uL    Lymph # 2.8 1.0 - 4.8 K/uL    Mono # 0.4 0.3 - 1.0 K/uL    Eos # 0.0 0.0 - 0.5 K/uL    Baso # 0.03 0.00 - 0.20 K/uL    nRBC 0 0 /100 WBC    Gran % 42.9 38.0 - 73.0 %    Lymph % 48.7 (H) 18.0 - 48.0 %    Mono % 7.0 4.0 - 15.0 %    Eosinophil % 0.7 0.0 - 8.0 %    Basophil % 0.5 0.0 - 1.9 %    Differential Method Automated    Comprehensive metabolic panel   Result Value Ref Range    Sodium 140 136 - 145 mmol/L     Potassium 3.6 3.5 - 5.1 mmol/L    Chloride 99 95 - 110 mmol/L    CO2 25 23 - 29 mmol/L    Glucose 123 (H) 70 - 110 mg/dL    BUN 9 6 - 20 mg/dL    Creatinine 0.6 0.5 - 1.4 mg/dL    Calcium 9.1 8.7 - 10.5 mg/dL    Total Protein 8.5 (H) 6.0 - 8.4 g/dL    Albumin 4.6 3.5 - 5.2 g/dL    Total Bilirubin 0.9 0.1 - 1.0 mg/dL    Alkaline Phosphatase 80 55 - 135 U/L    AST 27 10 - 40 U/L    ALT 23 10 - 44 U/L    Anion Gap 16 8 - 16 mmol/L    eGFR if African American >60.0 >60 mL/min/1.73 m^2    eGFR if non African American >60.0 >60 mL/min/1.73 m^2   TSH   Result Value Ref Range    TSH 1.760 0.340 - 5.600 uIU/mL   Urinalysis, Reflex to Urine Culture Urine, Clean Catch    Specimen: Urine   Result Value Ref Range    Specimen UA Urine, Clean Catch     Color, UA Yellow Yellow, Straw, Vikki    Appearance, UA Clear Clear    pH, UA 8.0 5.0 - 8.0    Specific Gravity, UA 1.010 1.005 - 1.030    Protein, UA Trace (A) Negative    Glucose, UA Negative Negative    Ketones, UA Negative Negative    Bilirubin (UA) Negative Negative    Occult Blood UA Negative Negative    Nitrite, UA Negative Negative    Urobilinogen, UA Negative Negative EU/dL    Leukocytes, UA Negative Negative   Drug screen panel, emergency   Result Value Ref Range    Benzodiazepines Negative Negative    Cocaine (Metab.) Negative Negative    Opiate Scrn, Ur Negative Negative    Barbiturate Screen, Ur Negative Negative    Amphetamine Screen, Ur Negative Negative    THC Negative Negative    Phencyclidine Negative Negative    Creatinine, Urine 66.0 23.0 - 375.0 mg/dL    Toxicology Information SEE COMMENT    Ethanol   Result Value Ref Range    Alcohol, Serum 226 (H) <10 mg/dL   Acetaminophen level   Result Value Ref Range    Acetaminophen (Tylenol), Serum <10.0 10.0 - 20.0 ug/mL   Salicylate level   Result Value Ref Range    Salicylate Lvl <4.0 (L) 15.0 - 30.0 mg/dL   Troponin I   Result Value Ref Range    Troponin I <0.030 <=0.040 ng/mL   Ethanol   Result Value Ref Range     Alcohol, Serum 182 (H) <10 mg/dL   Ethanol   Result Value Ref Range    Alcohol, Serum 97 (H) <10 mg/dL     CT Head Without Contrast   Final Result             ECG Results          EKG 12-lead (In process)  Result time 12/29/21 16:48:02    In process by Interface, Lab In [facility] (12/29/21 16:48:02)                 Narrative:    Test Reason : R07.9,    Vent. Rate : 078 BPM     Atrial Rate : 078 BPM     P-R Int : 156 ms          QRS Dur : 100 ms      QT Int : 352 ms       P-R-T Axes : 045 072 034 degrees     QTc Int : 401 ms    Normal sinus rhythm  Normal ECG  When compared with ECG of 04-DEC-2021 22:27,  No significant change was found    Referred By: [email or web address]   SELF           Confirmed By:                             Imaging Results          CT Head Without Contrast (Final result)  Result time 12/29/21 20:00:24    Final result by Kobi Carmichael MD (12/29/21 20:00:24)                 Narrative:    CMS MANDATED QUALITY DATA-CT RADIATION DOSE-436  All CT scans at this facility dose modulation, iterative reconstruction, and or weight-based dosing when appropriate to reduce radiation dose to as low as reasonably achievable.    HISTORY: Mental status change, unknown cause    FINDINGS: Comparison to multiple prior exams including 11/08/2021. There is no acute intracranial hemorrhage, with no mass effect or abnormal extra-axial fluid. Gray white differentiation is maintained, with the cortical sulci, ventricles and basal cisterns normal in size for the patient's age.    The cerebellum and brainstem are unremarkable. The visualized paranasal sinuses and mastoid air cells are clear.  There is no acute osseous abnormality.    IMPRESSION: Negative noncontrast head CT.    Electronically signed by:  Kobi Carmichael MD  12/29/2021 8:00 PM [facility] Workstation: [id number]                               Medications   QUEtiapine tablet 200 mg (200 mg Oral Given 12/29/21 2030)     Medical Decision Making:   ED Management:  This is a  36-year-old male who presents emergency department with headache and she just pain and delusions.  He was evaluated at bedside by myself with a  who was also evaluated by tele psychiatry.  Pec was recommended for grave disability.  Patient pending medical clearance pending alcohol level less than 100. He does not appear to be clinically intoxicated at this point.  From a standpoint medically clear but will after repeat labs.  Patient will be transfer to psychiatric facility for further care and evaluation of his symptoms.               ED Course as of 12/31/21 0253   Wed Dec 29, 2021   1728 EKG 4:39 p.m. normal sinus rhythm rate of 78.  No ST elevation or depression.  No STEMI.  EKG interpreted independently by me. [JR]   2119 Occult Blood UA: Negative [JR]   u Dec 30, 2021   0550 I assumed the care of this patient from the off going physician was awaiting repeat labs for medical clearance.  Patient is now medically cleared and PEC will remain and he will be transferred to the next available psychiatric facility for further treatment and care.  Patient's condition at time of clearance was stable. [RJ]   Fri Dec 31, 2021   0252 Patient resting comfortably in bed.  No complaints.  Awaiting placement at psychiatric facility. [JR]      ED Course User Index  [JR] Frank Gomez, DO  [RJ] Martell Diez MD        Medically cleared for psychiatry placement: 12/30/2021  5:50 AM    Clinical Impression:   Final diagnoses:  [R07.9] Chest pain  [F29] Psychosis, unspecified psychosis type (Primary)  [F22] Delusional disorder  [F10.10] Alcohol abuse          ED Disposition Condition    Transfer to Psych Facility         ED Prescriptions     None        Follow-up Information    None          Frank Gomez,   12/29/21 2137       Frank Gomez,   12/31/21 0214       Frank Gomez, DO  12/31/21 0253

## 2021-12-30 NOTE — CONSULTS
[facility]  Psychiatry  Telepsychiatry Consult Note    Please see previous notes:    Patient agreeable to consultation via telepsychiatry.    Tele-Consultation from Psychiatry started: 12/29/2021 at 6:30 PM  The chief complaint leading to psychiatric consultation is: Psychosis  This consultation was requested by Dr. Gomez, the Emergency Department attending physician.  The location of the consulting psychiatrist is [place], [place].  The patient location is  [facility] EMERGENCY DEPARTMENT   The patient arrived at the ED at: 6:13 PM    Also present with the patient at the time of the consultation: Nursing staff    Patient Identification:   Lawrence Echeverria is a 36 y.o. male.    Patient information was obtained from patient.  Patient presented voluntarily to the Emergency Department by private vehicle.    IP consult to Telemedicine - Psych  Consult performed by: Solitario Wasserman DO  Consult ordered by: Frank Gomez DO        Subjective:     History of Present Illness:  Per ED MD:    Chest Pain       Brain hurts - states feels pain in his brain      This is a 36-year-old male who presents emergency department with headache and with chest pain and with a psychological problem.  History was obtained from the patient using Serbian language [employer]umiGrow  WL 1262.  Patient states that his brain is been hurting him for about 6 months.  He says that he thinks that a spirit has entered into his brain and he has been having issues with fogginess and headaches ever since.  He says that he also feels palpitations in his heart and he occasionally has chest pain as well off and on for the last 6 months.  Nothing seems to bring it on or make it better or worse.  He says that he thinks that it is a psychological problem.  He says that he may benefit from evaluation by psychiatrist.  He says he has had this in the past.  He is supposed to be taking medication but he has not been taking any of his  "medicines.    On Interview:   Patient seen through teleconference tonight with the assistant of Indonesian . He reports that he has been having issues with, "brain pain" for the past 6 months. He reports that he has been having issues with understanding people that he is talking to and he does not feel like his normal self. He believes that the things that are going on in his brain are affecting his chest and his nervous system.    Patient denies using drugs however when he was made aware of his positive drug screen for opiates and amphetamines a month ago he reported that he only used, "a little bit of a crystal but I didn't like it." He denies using any drugs recently and he is concerned about the pain in his head and not being able to get any rest.       Psychiatric History:   Previous Psychiatric Hospitalizations: No   Previous Medication Trials: Yes  Previous Suicide Attempts: no   History of Violence: No  History of Depression: Yes  History of Nga: Unclear  History of Auditory/Visual Hallucination Yes  History of Delusions: Yes  Outpatient psychiatrist (current & past): No    Substance Abuse History:  Tobacco:No  Alcohol: Yes, beer  Illicit Substances:Denies per chart review opiates and amphetamines in drug screen   Detox/Rehab: Yes    Legal History: Past charges/incarcerations: Yes for legal status    Family Psychiatric History: Denies      Social History:  Developmental/Childhood:Achieved all developmental milestones timely  Education: 5th grade  Employment Status/Finances:Employed   Relationship Status/Sexual Orientation:   Children: 2  Housing Status: Home with family and friends   history:  NO  Access to gun: NO  Pentecostal:Actively participates in organized Hinduism  Recreational activities: Exercise   Patient was born and raised in [place]    Psychiatric Mental Status Exam:  Arousal: alert  Sensorium/Orientation: oriented to grossly intact  Behavior/Cooperation: normal, " "cooperative   Speech: normal tone, normal rate, normal pitch, normal volume  Language: grossly intact  Mood: " depressed "   Affect: constricted  Thought Process: Linear  Thought Content:   Auditory hallucinations: YES:     Visual hallucinations: NO  Paranoia: YES:     Delusions:  YES:     Suicidal ideation: NO  Homicidal ideation: NO  Attention/Concentration:  intact  Memory:    Recent:  Intact   Remote: Intact  Fund of Knowledge: Aware of current events   Abstract reasoning: Did not assess  Insight: poor awareness of illness  Judgment: Poor      Past Medical History:   Past Medical History:   Diagnosis Date    Alcohol abuse       Laboratory Data:   Labs Reviewed   SARS-COV-2 RNA AMPLIFICATION, QUAL   TROPONIN I   CBC W/ AUTO DIFFERENTIAL   COMPREHENSIVE METABOLIC PANEL   TSH   URINALYSIS, REFLEX TO URINE CULTURE   DRUG SCREEN PANEL, URINE EMERGENCY   ALCOHOL,MEDICAL (ETHANOL)   ACETAMINOPHEN LEVEL   SALICYLATE LEVEL   TROPONIN I       Neurological History:  Seizures: Unclear  Head trauma: Unclear    Allergies:   Review of patient's allergies indicates:  No Known Allergies    Medications in ER: Medications - No data to display    Medications at home:     No new subjective & objective note has been filed under this hospital service since the last note was generated.      Assessment - Diagnosis - Goals:     Diagnosis/Impression: Patient is a 36 year old man with a past psychiatric history of alcohol use disorder who presented to the ED voluntarily with psychotic symptoms including paranoia and auditory hallucinations. Of note the patient has a history of positive drug screens for amphetamines and opiates one month ago.    Initiate Seroquel 200 mg PO QHS    Unspecified Psychotic Disorder  R/O Stimulant Use Disorder, Amphetamine Type Substance  R/O Opiate Use Disorder    Rec: Recommend PEC as the patient is currently a gravely disabled secondary to psychiatric illness. Recommend involuntary placement in an inpatient " psychiatric facility once the patient is medically stable.     Time with patient: 51 minutes      More than 50% of the time was spent counseling/coordinating care    Consulting clinician was informed of the encounter and consult note.    Consultation ended: 12/29/2021 at 7:21 PM    Solitario Wasserman, DO   Psychiatry  [facility]

## 2021-12-30 NOTE — PLAN OF CARE
Sent referral to the following seeking inpatient psychiatric placment:  [facility]  [facility] Health [facility]       [facility] - Behavioral [facility]       [facility]AVIORAL HEALTH \[facility]\"" Behavioral [facility] [place] - [facility] - [facility]al Health       [facility]

## 2021-12-30 NOTE — PLAN OF CARE
Noted SW/CM consultation for Inpatient Psych placement that also notes patient is medically cleared. Sent referral to the following inpatient psych facilities:  1. [facility]    2. [facility]    3. [facility]    4. [facility]    5.[facility]    6. [facility]    7. [facility]    Awaiting response.

## 2021-12-30 NOTE — PLAN OF CARE
Received call from Cipriano with [facility] who informed me that they are not able to accept this patient r/t nurse at his facility feels that this patient is too high risk r/t palpitations and headache pain.

## 2021-12-31 VITALS
OXYGEN SATURATION: 98 % | HEART RATE: 80 BPM | BODY MASS INDEX: 29.18 KG/M2 | TEMPERATURE: 99 F | SYSTOLIC BLOOD PRESSURE: 136 MMHG | WEIGHT: 170 LBS | RESPIRATION RATE: 17 BRPM | DIASTOLIC BLOOD PRESSURE: 87 MMHG

## 2021-12-31 PROCEDURE — 25000003 PHARM REV CODE 250: Performed by: EMERGENCY MEDICINE

## 2021-12-31 RX ORDER — CLONIDINE HYDROCHLORIDE 0.1 MG/1
0.1 TABLET ORAL
Status: DISCONTINUED | OUTPATIENT
Start: 2021-12-31 | End: 2021-12-31

## 2021-12-31 RX ORDER — ACETAMINOPHEN 500 MG
1000 TABLET ORAL
Status: COMPLETED | OUTPATIENT
Start: 2021-12-31 | End: 2021-12-31

## 2021-12-31 RX ORDER — AMLODIPINE BESYLATE 5 MG/1
5 TABLET ORAL
Status: COMPLETED | OUTPATIENT
Start: 2021-12-31 | End: 2021-12-31

## 2021-12-31 RX ADMIN — ACETAMINOPHEN 1000 MG: 500 TABLET, FILM COATED ORAL at 03:12

## 2021-12-31 RX ADMIN — AMLODIPINE BESYLATE 5 MG: 5 TABLET ORAL at 03:12

## 2021-12-31 NOTE — PLAN OF CARE
[id number] - Received phone call from Shanice with [facility], who informed me of acceptance to [facility], [address] [place] [address], [address], [place] [address] (address verbally verified).  She reported that  agreable to accept patient and requested report to be called to [phone] - Called [phone] to [facility] and spoke with Leah to request secure patient transport for this patient to  [facility], [address] [place] [address], [address], [place] [address] using trip #[id number].

## 2021-12-31 NOTE — PROVIDER PROGRESS NOTES - EMERGENCY DEPT.
Encounter Date: 12/29/2021 36-year-old male waiting for psychiatric placement.  Patient clinically stable and no acute changes noted.  No evidence of aggressive behavior  ED Physician Progress Notes

## 2021-12-31 NOTE — PLAN OF CARE
Updated and additional referrals sent to the following:    [facility]illip Villarrealo [facility]  [facility]  [facility]  [facility]  [facility]  [facility]  [facility]  [facility]  [facility]  [facility]  [facility]  [facility]  [facility] [place] [facility]  Hema Mendoza

## 2022-01-23 ENCOUNTER — HOSPITAL ENCOUNTER (EMERGENCY)
Facility: HOSPITAL | Age: 37
Discharge: HOME | End: 2022-01-23
Attending: EMERGENCY MEDICINE

## 2022-01-23 VITALS
WEIGHT: 155 LBS | HEIGHT: 64 IN | DIASTOLIC BLOOD PRESSURE: 86 MMHG | HEART RATE: 93 BPM | TEMPERATURE: 99 F | OXYGEN SATURATION: 96 % | BODY MASS INDEX: 26.46 KG/M2 | SYSTOLIC BLOOD PRESSURE: 129 MMHG | RESPIRATION RATE: 22 BRPM

## 2022-01-23 DIAGNOSIS — F41.9 ANXIETY DISORDER, UNSPECIFIED TYPE: ICD-10-CM

## 2022-01-23 DIAGNOSIS — F14.10 COCAINE ABUSE: ICD-10-CM

## 2022-01-23 DIAGNOSIS — F10.10 ALCOHOL ABUSE: Primary | ICD-10-CM

## 2022-01-23 LAB
ALBUMIN SERPL BCP-MCNC: 4.3 G/DL (ref 3.5–5.2)
ALP SERPL-CCNC: 87 U/L (ref 55–135)
ALT SERPL W/O P-5'-P-CCNC: 24 U/L (ref 10–44)
AMPHET+METHAMPHET UR QL: NEGATIVE
ANION GAP SERPL CALC-SCNC: 17 MMOL/L (ref 8–16)
AST SERPL-CCNC: 28 U/L (ref 10–40)
BARBITURATES UR QL SCN>200 NG/ML: NEGATIVE
BASOPHILS # BLD AUTO: 0.02 K/UL (ref 0–0.2)
BASOPHILS NFR BLD: 0.4 % (ref 0–1.9)
BENZODIAZ UR QL SCN>200 NG/ML: NEGATIVE
BILIRUB SERPL-MCNC: 0.7 MG/DL (ref 0.1–1)
BUN SERPL-MCNC: 12 MG/DL (ref 6–20)
BZE UR QL SCN: ABNORMAL
CALCIUM SERPL-MCNC: 9.2 MG/DL (ref 8.7–10.5)
CANNABINOIDS UR QL SCN: NEGATIVE
CHLORIDE SERPL-SCNC: 95 MMOL/L (ref 95–110)
CO2 SERPL-SCNC: 22 MMOL/L (ref 23–29)
CREAT SERPL-MCNC: 0.7 MG/DL (ref 0.5–1.4)
CREAT UR-MCNC: 235 MG/DL (ref 23–375)
DIFFERENTIAL METHOD: ABNORMAL
EOSINOPHIL # BLD AUTO: 0 K/UL (ref 0–0.5)
EOSINOPHIL NFR BLD: 0.4 % (ref 0–8)
ERYTHROCYTE [DISTWIDTH] IN BLOOD BY AUTOMATED COUNT: 12.9 % (ref 11.5–14.5)
EST. GFR  (AFRICAN AMERICAN): >60 ML/MIN/1.73 M^2
EST. GFR  (NON AFRICAN AMERICAN): >60 ML/MIN/1.73 M^2
ETHANOL SERPL-MCNC: 129 MG/DL
GLUCOSE SERPL-MCNC: 107 MG/DL (ref 70–110)
HCT VFR BLD AUTO: 44.3 % (ref 40–54)
HGB BLD-MCNC: 15.5 G/DL (ref 14–18)
IMM GRANULOCYTES # BLD AUTO: 0.01 K/UL (ref 0–0.04)
IMM GRANULOCYTES NFR BLD AUTO: 0.2 % (ref 0–0.5)
LYMPHOCYTES # BLD AUTO: 1.4 K/UL (ref 1–4.8)
LYMPHOCYTES NFR BLD: 26.3 % (ref 18–48)
MAGNESIUM SERPL-MCNC: 1.8 MG/DL (ref 1.6–2.6)
MCH RBC QN AUTO: 29.6 PG (ref 27–31)
MCHC RBC AUTO-ENTMCNC: 35 G/DL (ref 32–36)
MCV RBC AUTO: 85 FL (ref 82–98)
MONOCYTES # BLD AUTO: 0.4 K/UL (ref 0.3–1)
MONOCYTES NFR BLD: 6.7 % (ref 4–15)
NEUTROPHILS # BLD AUTO: 3.4 K/UL (ref 1.8–7.7)
NEUTROPHILS NFR BLD: 66 % (ref 38–73)
NRBC BLD-RTO: 0 /100 WBC
OPIATES UR QL SCN: NEGATIVE
PCP UR QL SCN>25 NG/ML: NEGATIVE
PLATELET # BLD AUTO: 455 K/UL (ref 150–450)
PMV BLD AUTO: 8.7 FL (ref 9.2–12.9)
POTASSIUM SERPL-SCNC: 3.6 MMOL/L (ref 3.5–5.1)
PROT SERPL-MCNC: 7.8 G/DL (ref 6–8.4)
RBC # BLD AUTO: 5.23 M/UL (ref 4.6–6.2)
SARS-COV-2 RDRP RESP QL NAA+PROBE: NEGATIVE
SODIUM SERPL-SCNC: 134 MMOL/L (ref 136–145)
TOXICOLOGY INFORMATION: ABNORMAL
WBC # BLD AUTO: 5.21 K/UL (ref 3.9–12.7)

## 2022-01-23 PROCEDURE — 80053 COMPREHEN METABOLIC PANEL: CPT | Performed by: EMERGENCY MEDICINE

## 2022-01-23 PROCEDURE — 96365 THER/PROPH/DIAG IV INF INIT: CPT

## 2022-01-23 PROCEDURE — 99284 EMERGENCY DEPT VISIT MOD MDM: CPT | Mod: 25

## 2022-01-23 PROCEDURE — 82077 ASSAY SPEC XCP UR&BREATH IA: CPT | Performed by: EMERGENCY MEDICINE

## 2022-01-23 PROCEDURE — 96375 TX/PRO/DX INJ NEW DRUG ADDON: CPT

## 2022-01-23 PROCEDURE — 93005 ELECTROCARDIOGRAM TRACING: CPT | Performed by: SPECIALIST

## 2022-01-23 PROCEDURE — 83735 ASSAY OF MAGNESIUM: CPT | Performed by: EMERGENCY MEDICINE

## 2022-01-23 PROCEDURE — 93010 ELECTROCARDIOGRAM REPORT: CPT | Mod: ,,, | Performed by: SPECIALIST

## 2022-01-23 PROCEDURE — U0002 COVID-19 LAB TEST NON-CDC: HCPCS | Performed by: EMERGENCY MEDICINE

## 2022-01-23 PROCEDURE — 25000003 PHARM REV CODE 250: Performed by: EMERGENCY MEDICINE

## 2022-01-23 PROCEDURE — 63600175 PHARM REV CODE 636 W HCPCS: Performed by: EMERGENCY MEDICINE

## 2022-01-23 PROCEDURE — 96366 THER/PROPH/DIAG IV INF ADDON: CPT

## 2022-01-23 PROCEDURE — 80307 DRUG TEST PRSMV CHEM ANLYZR: CPT | Performed by: EMERGENCY MEDICINE

## 2022-01-23 PROCEDURE — 93010 EKG 12-LEAD: ICD-10-PCS | Mod: ,,, | Performed by: SPECIALIST

## 2022-01-23 PROCEDURE — 85025 COMPLETE CBC W/AUTO DIFF WBC: CPT | Performed by: EMERGENCY MEDICINE

## 2022-01-23 RX ORDER — LORAZEPAM 2 MG/ML
1 INJECTION INTRAMUSCULAR
Status: COMPLETED | OUTPATIENT
Start: 2022-01-23 | End: 2022-01-23

## 2022-01-23 RX ORDER — BUSPIRONE HYDROCHLORIDE 5 MG/1
5 TABLET ORAL 2 TIMES DAILY
Status: ON HOLD | COMMUNITY
End: 2023-10-12 | Stop reason: HOSPADM

## 2022-01-23 RX ADMIN — FOLIC ACID: 5 INJECTION, SOLUTION INTRAMUSCULAR; INTRAVENOUS; SUBCUTANEOUS at 10:01

## 2022-01-23 RX ADMIN — LORAZEPAM 1 MG: 2 INJECTION INTRAMUSCULAR; INTRAVENOUS at 10:01

## 2022-01-23 NOTE — ED PROVIDER NOTES
Encounter Date: 1/23/2022       History     Chief Complaint   Patient presents with    Headache     X 3 DAYS, PAIN RAD TO CHEST    Cough    SINUS DRIP    ETOH USE     Patient here with reported headache radiating to his chest with anxiety increased alcohol use onset of symptoms over last 3 days he does have cough from postnasal drip and some sputum production no shortness of breath no nausea vomiting patient does have a history of hypertension has been noncompliant with medications because he has been drinking alcohol recently did not want to mix his medications with alcohol states he does not drink every day typically but has been drinking more frequently lately with the symptoms he denies any diarrhea no urinary symptoms reported he does report feeling very anxious has had difficulty with alcohol withdrawal in the past review of patient's charts shows he was transferred to [facility] for treatment of acute psychosis he the end of December that time patient did admit to alcohol abuse denies any other substance abuse review patient's previous tox screen shows use of opiates and amphetamines there is no evidence of psychosis at this time the patient does seem tremulous and anxious        Review of patient's allergies indicates:  No Known Allergies  Past Medical History:   Diagnosis Date    Alcohol abuse     GERD (gastroesophageal reflux disease)     Hypertension      No past surgical history on file.  No family history on file.  Social History     Substance Use Topics    Alcohol use: Yes    Drug use: Never     Review of Systems   Constitutional: Positive for chills.   HENT: Positive for congestion.    Respiratory: Positive for cough.    Neurological: Positive for headaches. Negative for seizures and syncope.   Psychiatric/Behavioral: Negative for behavioral problems, hallucinations and suicidal ideas. The patient is nervous/anxious.    All other systems reviewed and are negative.      Physical Exam      Initial Vitals [01/23/22 0902]   BP Pulse Resp Temp SpO2   (!) 156/105 96 (!) 22 98.8 °F (37.1 °C) 97 %      MAP       --         Physical Exam    Constitutional: He appears well-developed and well-nourished. No distress.   HENT:   Head: Normocephalic and atraumatic.   Right Ear: External ear normal.   Left Ear: External ear normal.   Mouth/Throat: Oropharynx is clear and moist.   Eyes: EOM are normal. Pupils are equal, round, and reactive to light.   Neck: Neck supple.   Normal range of motion.  Cardiovascular: Normal rate, regular rhythm, S1 normal, S2 normal, normal heart sounds and intact distal pulses.   Pulmonary/Chest: Breath sounds normal.   Abdominal: Abdomen is soft. Normal appearance and bowel sounds are normal. There is no abdominal tenderness.   Musculoskeletal:         General: Normal range of motion.      Cervical back: Normal range of motion and neck supple.     Neurological: He is alert and oriented to person, place, and time. He has normal strength. No cranial nerve deficit. GCS score is 15. GCS eye subscore is 4. GCS verbal subscore is 5. GCS motor subscore is 6.   Skin: Skin is warm and dry. No rash noted.   Psychiatric: His speech is normal and behavior is normal. Judgment and thought content normal. His mood appears anxious. He is not actively hallucinating. Thought content is not paranoid and not delusional. Cognition and memory are normal. He expresses no suicidal ideation. He is attentive.         ED Course   Procedures  Labs Reviewed   CBC W/ AUTO DIFFERENTIAL - Abnormal; Notable for the following components:       Result Value    Platelets 455 (*)     MPV 8.7 (*)     All other components within normal limits   COMPREHENSIVE METABOLIC PANEL - Abnormal; Notable for the following components:    Sodium 134 (*)     CO2 22 (*)     Anion Gap 17 (*)     All other components within normal limits   DRUG SCREEN PANEL, URINE EMERGENCY - Abnormal; Notable for the following components:    Cocaine  (Metab.) Presumptive Positive (*)     All other components within normal limits    Narrative:     Specimen Source->Urine   ALCOHOL,MEDICAL (ETHANOL) - Abnormal; Notable for the following components:    Alcohol, Serum 129 (*)     All other components within normal limits   SARS-COV-2 RNA AMPLIFICATION, QUAL   MAGNESIUM        ECG Results          EKG 12-lead (In process)  Result time 01/23/22 11:49:37    In process by Interface, Lab In [facility] (01/23/22 11:49:37)                 Narrative:    Test Reason : R07.9,    Vent. Rate : 092 BPM     Atrial Rate : 092 BPM     P-R Int : 142 ms          QRS Dur : 098 ms      QT Int : 336 ms       P-R-T Axes : 042 074 036 degrees     QTc Int : 415 ms    Normal sinus rhythm  Normal ECG  When compared with ECG of 29-DEC-2021 16:39,  No significant change was found    Referred By: [email or web address]   SELF           Confirmed By:                             Imaging Results          CT Head Without Contrast (Final result)  Result time 01/23/22 11:54:48    Final result by Gilberto Bennett MD (01/23/22 11:54:48)                 Narrative:    CMS MANDATED QUALITY DATA - CT RADIATION - 436    All CT scans at this facility utilize dose modulation, iterative reconstruction, and/or weight based dosing when appropriate to reduce radiation dose to as low as reasonably achievable.          Reason: Headache, new or worsening, neuro deficit (Age 19-49y)    TECHNIQUE: Head CT without IV contrast.    COMPARISON: 12/29/2021    FINDINGS:  Gray-white differentiation is maintained without hemorrhage, midline shift, or mass effect.    The ventricles and cisterns are maintained.    Calvarium is intact.    Mucosal thickening affects bilateral ethmoid and maxillary sinuses.    IMPRESSION:  Negative noncontrast head CT.    Electronically signed by:  Gilberto Bennett MD  1/23/2022 11:54 AM CST Workstation: [id number]                             X-Ray Chest AP Portable (Final result)  Result time 01/23/22 09:29:02    Final  result by Gilberto Bennett MD (01/23/22 09:29:02)                 Impression:      Negative chest.      Electronically signed by: Gilberto Bennett MD  Date:    01/23/2022  Time:    09:29             Narrative:    EXAMINATION:  XR CHEST AP PORTABLE    CLINICAL HISTORY:  COVID-19;    FINDINGS:  Portable chest at 927 compared with 12/04/2021 shows normal cardiomediastinal silhouette.    Lungs are clear. Pulmonary vasculature is normal. No acute osseous abnormality.                                 Medications   sodium chloride 0.9% 1,000 mL with mvi, adult no.4 with vit K 3,300 unit- 150 mcg/10 mL 10 mL, thiamine 100 mg, folic acid 1 mg infusion ( Intravenous New Bag 1/23/22 1058)   lorazepam injection 1 mg (1 mg Intravenous Given 1/23/22 1055)     Medical Decision Making:   ED Management:  Patient's withdrawal symptoms improved with single dose of Ativan the patient did have cocaine in his system well will refer for outpatient substance abuse counseling and psychiatric assistance with anxiety                      Clinical Impression:   Final diagnoses:  [F14.10] Cocaine abuse  [F10.10] Alcohol abuse (Primary)  [F41.9] Anxiety disorder, unspecified type          ED Disposition Condition    Discharge Stable        ED Prescriptions     None        Follow-up Information    None          Tio Abel MD  01/23/22 2138

## 2022-01-23 NOTE — ED NOTES
36 YOM c/o HA and non radiating mid CP 10/10 that began this am. Pt stated drinking [employer] daily est 15 beers daily. Last drink yesterday. Stated ETOH detox earlier this month. Denies AVH. Denies n/v/d. No tremors noted. Denies any other complaints.     Adult Physical Assessment  LOC: Patient is awake, alert, oriented and speaking appropriately at this time.  APPEARANCE: Patient resting comfortably and appears to be in no acute distress at this time. Patient is clean and well groomed, patient's clothing is properly fastened.  SKIN:The skin is warm and dry, color consistent with ethnicity, patient has normal skin turgor and moist mucus membranes, skin intact, no breakdown or brusing noted.  MUSCULOSKELETAL: Patient moving all extremities well, no obvious swelling or deformities noted.  RESPIRATORY: Airway is open and patent, respirations are spontaneous, patient has a normal effort and rate, no accessory muscle use noted.  CARDIAC: Patient has a normal rate and rhythm, no periphreal edema noted in any extremity, capillary refill < 3 seconds in all extremities.  ABDOMEN: Soft and non tender to palpation, no abdominal distention noted.  NEUROLOGIC: Eyes open spontaneously, behavior appropriate to situation, follows commands, facial expression symmetrical, bilateral hand grasp equal and even, purposeful motor response noted, normal sensation in all extremities when touched with a finger.      VSS. ED workup in progress. Call light within pt reach. Safety measures in place: side rails up X2. Will continue to monitor.

## 2022-01-25 ENCOUNTER — HOSPITAL ENCOUNTER (EMERGENCY)
Facility: HOSPITAL | Age: 37
Discharge: HOME | End: 2022-01-25
Attending: EMERGENCY MEDICINE

## 2022-01-25 ENCOUNTER — HOSPITAL ENCOUNTER (EMERGENCY)
Facility: HOSPITAL | Age: 37
Discharge: STILL ADMITTED OR ADMINISTRATIVE | End: 2022-01-25

## 2022-01-25 VITALS
OXYGEN SATURATION: 99 % | SYSTOLIC BLOOD PRESSURE: 147 MMHG | WEIGHT: 170 LBS | BODY MASS INDEX: 29.02 KG/M2 | HEART RATE: 60 BPM | RESPIRATION RATE: 15 BRPM | DIASTOLIC BLOOD PRESSURE: 96 MMHG | HEIGHT: 64 IN | TEMPERATURE: 98 F

## 2022-01-25 VITALS
RESPIRATION RATE: 18 BRPM | DIASTOLIC BLOOD PRESSURE: 101 MMHG | SYSTOLIC BLOOD PRESSURE: 164 MMHG | OXYGEN SATURATION: 98 % | WEIGHT: 170 LBS | HEIGHT: 65 IN | TEMPERATURE: 98 F | BODY MASS INDEX: 28.32 KG/M2 | HEART RATE: 72 BPM

## 2022-01-25 DIAGNOSIS — R07.9 CHEST PAIN: ICD-10-CM

## 2022-01-25 DIAGNOSIS — R00.2 PALPITATIONS: Primary | ICD-10-CM

## 2022-01-25 DIAGNOSIS — R52 PAIN: ICD-10-CM

## 2022-01-25 LAB
ALBUMIN SERPL BCP-MCNC: 4.2 G/DL (ref 3.5–5.2)
ALP SERPL-CCNC: 92 U/L (ref 55–135)
ALT SERPL W/O P-5'-P-CCNC: 29 U/L (ref 10–44)
AMPHET+METHAMPHET UR QL: NEGATIVE
ANION GAP SERPL CALC-SCNC: 13 MMOL/L (ref 8–16)
AST SERPL-CCNC: 35 U/L (ref 10–40)
BARBITURATES UR QL SCN>200 NG/ML: NEGATIVE
BASOPHILS # BLD AUTO: 0.02 K/UL (ref 0–0.2)
BASOPHILS NFR BLD: 0.3 % (ref 0–1.9)
BENZODIAZ UR QL SCN>200 NG/ML: NEGATIVE
BILIRUB SERPL-MCNC: 0.8 MG/DL (ref 0.1–1)
BILIRUB UR QL STRIP: NEGATIVE
BNP SERPL-MCNC: 25 PG/ML (ref 0–99)
BUN SERPL-MCNC: 10 MG/DL (ref 6–20)
BZE UR QL SCN: NEGATIVE
CALCIUM SERPL-MCNC: 9.3 MG/DL (ref 8.7–10.5)
CANNABINOIDS UR QL SCN: NEGATIVE
CHLORIDE SERPL-SCNC: 98 MMOL/L (ref 95–110)
CK SERPL-CCNC: 122 U/L (ref 20–200)
CLARITY UR: CLEAR
CO2 SERPL-SCNC: 24 MMOL/L (ref 23–29)
COLOR UR: YELLOW
CREAT SERPL-MCNC: 0.6 MG/DL (ref 0.5–1.4)
CREAT UR-MCNC: 49 MG/DL (ref 23–375)
DIFFERENTIAL METHOD: ABNORMAL
EOSINOPHIL # BLD AUTO: 0.1 K/UL (ref 0–0.5)
EOSINOPHIL NFR BLD: 1.5 % (ref 0–8)
ERYTHROCYTE [DISTWIDTH] IN BLOOD BY AUTOMATED COUNT: 13 % (ref 11.5–14.5)
EST. GFR  (AFRICAN AMERICAN): >60 ML/MIN/1.73 M^2
EST. GFR  (NON AFRICAN AMERICAN): >60 ML/MIN/1.73 M^2
ETHANOL SERPL-MCNC: <5 MG/DL
GLUCOSE SERPL-MCNC: 139 MG/DL (ref 70–110)
GLUCOSE UR QL STRIP: NEGATIVE
HCT VFR BLD AUTO: 43.2 % (ref 40–54)
HGB BLD-MCNC: 14.8 G/DL (ref 14–18)
HGB UR QL STRIP: NEGATIVE
IMM GRANULOCYTES # BLD AUTO: 0.02 K/UL (ref 0–0.04)
IMM GRANULOCYTES NFR BLD AUTO: 0.3 % (ref 0–0.5)
INR PPP: 1
KETONES UR QL STRIP: NEGATIVE
LEUKOCYTE ESTERASE UR QL STRIP: NEGATIVE
LIPASE SERPL-CCNC: 74 U/L (ref 4–60)
LYMPHOCYTES # BLD AUTO: 0.9 K/UL (ref 1–4.8)
LYMPHOCYTES NFR BLD: 13.7 % (ref 18–48)
MAGNESIUM SERPL-MCNC: 1.7 MG/DL (ref 1.6–2.6)
MCH RBC QN AUTO: 30.3 PG (ref 27–31)
MCHC RBC AUTO-ENTMCNC: 34.3 G/DL (ref 32–36)
MCV RBC AUTO: 89 FL (ref 82–98)
MONOCYTES # BLD AUTO: 0.3 K/UL (ref 0.3–1)
MONOCYTES NFR BLD: 5 % (ref 4–15)
NEUTROPHILS # BLD AUTO: 5.4 K/UL (ref 1.8–7.7)
NEUTROPHILS NFR BLD: 79.2 % (ref 38–73)
NITRITE UR QL STRIP: NEGATIVE
NRBC BLD-RTO: 0 /100 WBC
OPIATES UR QL SCN: NEGATIVE
PCP UR QL SCN>25 NG/ML: NEGATIVE
PH UR STRIP: 7 [PH] (ref 5–8)
PLATELET # BLD AUTO: 373 K/UL (ref 150–450)
PMV BLD AUTO: 8.9 FL (ref 9.2–12.9)
POTASSIUM SERPL-SCNC: 3.9 MMOL/L (ref 3.5–5.1)
PROT SERPL-MCNC: 7.8 G/DL (ref 6–8.4)
PROT UR QL STRIP: NEGATIVE
PROTHROMBIN TIME: 12.8 SEC (ref 11.4–13.7)
RBC # BLD AUTO: 4.88 M/UL (ref 4.6–6.2)
SARS-COV-2 RDRP RESP QL NAA+PROBE: NEGATIVE
SODIUM SERPL-SCNC: 135 MMOL/L (ref 136–145)
SP GR UR STRIP: 1.01 (ref 1–1.03)
TOXICOLOGY INFORMATION: NORMAL
TROPONIN I SERPL DL<=0.01 NG/ML-MCNC: <0.03 NG/ML
TROPONIN I SERPL DL<=0.01 NG/ML-MCNC: <0.03 NG/ML
URN SPEC COLLECT METH UR: NORMAL
UROBILINOGEN UR STRIP-ACNC: NEGATIVE EU/DL
WBC # BLD AUTO: 6.79 K/UL (ref 3.9–12.7)

## 2022-01-25 PROCEDURE — 99284 EMERGENCY DEPT VISIT MOD MDM: CPT | Mod: 25

## 2022-01-25 PROCEDURE — 93005 ELECTROCARDIOGRAM TRACING: CPT | Performed by: SPECIALIST

## 2022-01-25 PROCEDURE — 84484 ASSAY OF TROPONIN QUANT: CPT | Performed by: NURSE PRACTITIONER

## 2022-01-25 PROCEDURE — 83735 ASSAY OF MAGNESIUM: CPT | Performed by: NURSE PRACTITIONER

## 2022-01-25 PROCEDURE — 82077 ASSAY SPEC XCP UR&BREATH IA: CPT | Performed by: NURSE PRACTITIONER

## 2022-01-25 PROCEDURE — 83880 ASSAY OF NATRIURETIC PEPTIDE: CPT | Performed by: NURSE PRACTITIONER

## 2022-01-25 PROCEDURE — 82550 ASSAY OF CK (CPK): CPT | Performed by: NURSE PRACTITIONER

## 2022-01-25 PROCEDURE — 85610 PROTHROMBIN TIME: CPT | Performed by: NURSE PRACTITIONER

## 2022-01-25 PROCEDURE — 85025 COMPLETE CBC W/AUTO DIFF WBC: CPT | Performed by: NURSE PRACTITIONER

## 2022-01-25 PROCEDURE — 81003 URINALYSIS AUTO W/O SCOPE: CPT | Mod: 59 | Performed by: EMERGENCY MEDICINE

## 2022-01-25 PROCEDURE — 99999 HC NO LEVEL OF SERVICE - ED ONLY: CPT

## 2022-01-25 PROCEDURE — 93010 ELECTROCARDIOGRAM REPORT: CPT | Mod: ,,, | Performed by: SPECIALIST

## 2022-01-25 PROCEDURE — U0002 COVID-19 LAB TEST NON-CDC: HCPCS | Performed by: NURSE PRACTITIONER

## 2022-01-25 PROCEDURE — 80307 DRUG TEST PRSMV CHEM ANLYZR: CPT | Performed by: EMERGENCY MEDICINE

## 2022-01-25 PROCEDURE — 80053 COMPREHEN METABOLIC PANEL: CPT | Performed by: NURSE PRACTITIONER

## 2022-01-25 PROCEDURE — 93010 EKG 12-LEAD: ICD-10-PCS | Mod: ,,, | Performed by: SPECIALIST

## 2022-01-25 PROCEDURE — 36415 COLL VENOUS BLD VENIPUNCTURE: CPT | Performed by: NURSE PRACTITIONER

## 2022-01-25 PROCEDURE — 83690 ASSAY OF LIPASE: CPT | Performed by: NURSE PRACTITIONER

## 2022-01-26 NOTE — ED PROVIDER NOTES
Encounter Date: 1/25/2022       History     Chief Complaint   Patient presents with    Palpitations     Patient reports palpitations and headache since last night      36-year-old  speaking male presents to the emergency department with complaint of feeling like his heart is racing intermittently, he also reports that he has had intermittent headaches states he is having trouble sleeping however upon arrival to the patient's room he is sleeping and had to be awakened with physical stimulus to his chest.  The patient was seen here 2 days ago for the same complaint he has also been seen here several times for the same complaint he does have a history of alcohol abuse however he reports his last drink was 6 days ago.  The patient is stating that he feels as if he is getting anxious which makes his pain and his chest feel like tightness and pressure he states that when he is able to relax are calm himself that is resolving.  The patient has no current complaints  The patient has benign abdominal pain, nausea vomiting or diarrhea.  Maricarmen language line was used for my history and physical        Review of patient's allergies indicates:  No Known Allergies  Past Medical History:   Diagnosis Date    Alcohol abuse     GERD (gastroesophageal reflux disease)     Hypertension      No past surgical history on file.  No family history on file.  Social History     Substance Use Topics    Alcohol use: Yes    Drug use: Never     Review of Systems   Constitutional: Negative for fatigue and fever.   HENT: Negative.    Respiratory: Positive for chest tightness. Negative for shortness of breath and wheezing.    Cardiovascular: Negative.    Genitourinary: Negative.    Musculoskeletal: Negative.    Skin: Negative.    Neurological: Positive for headaches.   Hematological: Negative.    Psychiatric/Behavioral: Negative.    All other systems reviewed and are negative.      Physical Exam     Initial Vitals [01/25/22 1423]   BP  Pulse Resp Temp SpO2   (!) 164/101 72 18 97.9 °F (36.6 °C) 98 %      MAP       --         Physical Exam    Nursing note and vitals reviewed.  Constitutional: He appears well-developed and well-nourished.   HENT:   Head: Normocephalic.   Right Ear: External ear normal.   Left Ear: External ear normal.   Eyes: Conjunctivae and EOM are normal. Pupils are equal, round, and reactive to light.   Neck: Neck supple.   Normal range of motion.  Cardiovascular: Normal rate, regular rhythm, normal heart sounds and intact distal pulses.   Pulmonary/Chest: Breath sounds normal. No respiratory distress.   Abdominal: Abdomen is soft. Bowel sounds are normal. He exhibits no distension. There is no abdominal tenderness.   Musculoskeletal:         General: Normal range of motion.      Cervical back: Normal range of motion and neck supple.     Neurological: He is alert and oriented to person, place, and time. He has normal strength. GCS score is 15. GCS eye subscore is 4. GCS verbal subscore is 5. GCS motor subscore is 6.   Skin: Skin is warm.   Psychiatric: He has a normal mood and affect.         ED Course   Procedures  Labs Reviewed   CBC W/ AUTO DIFFERENTIAL - Abnormal; Notable for the following components:       Result Value    MPV 8.9 (*)     Lymph # 0.9 (*)     Gran % 79.2 (*)     Lymph % 13.7 (*)     All other components within normal limits   COMPREHENSIVE METABOLIC PANEL - Abnormal; Notable for the following components:    Sodium 135 (*)     Glucose 139 (*)     All other components within normal limits   LIPASE - Abnormal; Notable for the following components:    Lipase 74 (*)     All other components within normal limits   B-TYPE NATRIURETIC PEPTIDE   TROPONIN I   PROTIME-INR   SARS-COV-2 RNA AMPLIFICATION, QUAL   LIPASE   MAGNESIUM   ALCOHOL,MEDICAL (ETHANOL)   TROPONIN I   CK   URINALYSIS    Narrative:     Specimen Source->Urine   DRUG SCREEN PANEL, URINE EMERGENCY    Narrative:     Specimen Source->Urine   MAGNESIUM    TROPONIN I   ALCOHOL,MEDICAL (ETHANOL)   CK   AMMONIA          Imaging Results          X-Ray Chest PA And Lateral (Final result)  Result time 01/25/22 18:00:08    Final result by Kobi Carmichael MD (01/25/22 18:00:08)                 Narrative:    HISTORY: Palpitations.    FINDINGS: PA and lateral chest radiograph at 1739 hours compared to 01/23/2022 show the trachea is midline, with the cardiomediastinal silhouette and pulmonary vascular distribution within normal limits.    The lungs are normally and symmetrically expanded, with no consolidation, pleural effusion or evidence of pulmonary edema. No confluent infiltrates or pneumothorax. There are no significant osseous abnormalities.    IMPRESSION: No evidence of active cardiopulmonary disease.    Electronically signed by:  Kobi Carmichael MD  1/25/2022 6:00 PM CST Workstation: [id number]                               Medications - No data to display  Medical Decision Making:   History:   Old Records Summarized: records from previous admission(s).  Initial Assessment:   36-year-old  speaking male presents to the emergency department with complaint of feeling like his heart is racing intermittently, he also reports that he has had intermittent headaches states he is having trouble sleeping however upon arrival to the patient's room he is sleeping and had to be awakened with physical stimulus to his chest.  The patient was seen here 2 days ago for the same complaint he has also been seen here several times for the same complaint he does have a history of alcohol abuse however he reports his last drink was 6 days ago.  The patient is stating that he feels as if he is getting anxious which makes his pain and his chest feel like tightness and pressure he states that when he is able to relax are calm himself that is resolving.  The patient has no current complaints  The patient has benign abdominal pain, nausea vomiting or diarrhea.  Maricarmen language line was used  for my history and physical          Differential Diagnosis:   Reflux, anxiety, ACS  ED Management:  Patient presents emergency department with complaint of chest tightness he states that he feels this way when he is anxious about something he is able to resolve it when he is able to relax.  Patient reported to me via Maricarmen language line they has been unable to sleep in the last 2 days however the patient was noted to be sleeping on several occasions in the emergency department.  The patient also was reporting that he was having palpitations of felt like his heart was racing although his heart rate has been in the 60s entire time in the emergency department.  Patient's troponin is normal he has no EKG changes all labs are unremarkable his lipase is mildly elevated however it is trending down.  Patient will be discharged home he was instructed follow up with [facility] for further evaluation, definitive care was given detailed return precautions                      Clinical Impression:   Final diagnoses:  [R07.9] Chest pain  [R52] Pain  [R00.2] Palpitations (Primary)          ED Disposition Condition    Discharge Stable        ED Prescriptions     None        Follow-up Information     Follow up With Specialties Details Why Contact [facility]  Schedule an appointment as soon as possible for a visit in 2 days  [facility] [address] [address]  [phone]             WOJCIECH Luna  01/25/22 0874

## 2022-02-11 ENCOUNTER — HOSPITAL ENCOUNTER (EMERGENCY)
Facility: HOSPITAL | Age: 37
Discharge: PSYCHIATRIC OR SUBSTANCE USE FACILITY | End: 2022-02-12
Attending: EMERGENCY MEDICINE

## 2022-02-11 DIAGNOSIS — R10.9 ABDOMINAL PAIN: ICD-10-CM

## 2022-02-11 DIAGNOSIS — F22 PARANOID: ICD-10-CM

## 2022-02-11 DIAGNOSIS — R45.851 SUICIDAL IDEATION: Primary | ICD-10-CM

## 2022-02-11 DIAGNOSIS — R44.3 HALLUCINATIONS: ICD-10-CM

## 2022-02-11 DIAGNOSIS — F10.10 ALCOHOL ABUSE: ICD-10-CM

## 2022-02-11 LAB
ALBUMIN SERPL BCP-MCNC: 4.7 G/DL (ref 3.5–5.2)
ALP SERPL-CCNC: 96 U/L (ref 55–135)
ALT SERPL W/O P-5'-P-CCNC: 34 U/L (ref 10–44)
AMPHET+METHAMPHET UR QL: NEGATIVE
ANION GAP SERPL CALC-SCNC: 18 MMOL/L (ref 8–16)
APAP SERPL-MCNC: <10 UG/ML (ref 10–20)
AST SERPL-CCNC: 55 U/L (ref 10–40)
BARBITURATES UR QL SCN>200 NG/ML: NEGATIVE
BASOPHILS # BLD AUTO: 0.03 K/UL (ref 0–0.2)
BASOPHILS NFR BLD: 0.6 % (ref 0–1.9)
BENZODIAZ UR QL SCN>200 NG/ML: NEGATIVE
BILIRUB SERPL-MCNC: 0.8 MG/DL (ref 0.1–1)
BILIRUB UR QL STRIP: NEGATIVE
BUN SERPL-MCNC: 7 MG/DL (ref 6–20)
BZE UR QL SCN: NEGATIVE
CALCIUM SERPL-MCNC: 9.5 MG/DL (ref 8.7–10.5)
CANNABINOIDS UR QL SCN: NEGATIVE
CHLORIDE SERPL-SCNC: 96 MMOL/L (ref 95–110)
CK SERPL-CCNC: 243 U/L (ref 20–200)
CLARITY UR: CLEAR
CO2 SERPL-SCNC: 23 MMOL/L (ref 23–29)
COLOR UR: COLORLESS
CREAT SERPL-MCNC: 0.7 MG/DL (ref 0.5–1.4)
CREAT SERPL-MCNC: 1 MG/DL (ref 0.5–1.4)
CREAT UR-MCNC: 10 MG/DL (ref 23–375)
DIFFERENTIAL METHOD: ABNORMAL
EOSINOPHIL # BLD AUTO: 0 K/UL (ref 0–0.5)
EOSINOPHIL NFR BLD: 0.4 % (ref 0–8)
ERYTHROCYTE [DISTWIDTH] IN BLOOD BY AUTOMATED COUNT: 13.2 % (ref 11.5–14.5)
EST. GFR  (AFRICAN AMERICAN): >60 ML/MIN/1.73 M^2
EST. GFR  (NON AFRICAN AMERICAN): >60 ML/MIN/1.73 M^2
ETHANOL SERPL-MCNC: 109 MG/DL
ETHANOL SERPL-MCNC: 277 MG/DL
GLUCOSE SERPL-MCNC: 125 MG/DL (ref 70–110)
GLUCOSE UR QL STRIP: NEGATIVE
HCT VFR BLD AUTO: 43.8 % (ref 40–54)
HGB BLD-MCNC: 15.6 G/DL (ref 14–18)
HGB UR QL STRIP: NEGATIVE
IMM GRANULOCYTES # BLD AUTO: 0.01 K/UL (ref 0–0.04)
IMM GRANULOCYTES NFR BLD AUTO: 0.2 % (ref 0–0.5)
INR PPP: 1
KETONES UR QL STRIP: NEGATIVE
LEUKOCYTE ESTERASE UR QL STRIP: NEGATIVE
LIPASE SERPL-CCNC: 84 U/L (ref 4–60)
LYMPHOCYTES # BLD AUTO: 1.9 K/UL (ref 1–4.8)
LYMPHOCYTES NFR BLD: 36.8 % (ref 18–48)
MAGNESIUM SERPL-MCNC: 2 MG/DL (ref 1.6–2.6)
MCH RBC QN AUTO: 30.1 PG (ref 27–31)
MCHC RBC AUTO-ENTMCNC: 35.6 G/DL (ref 32–36)
MCV RBC AUTO: 85 FL (ref 82–98)
MONOCYTES # BLD AUTO: 0.3 K/UL (ref 0.3–1)
MONOCYTES NFR BLD: 5.9 % (ref 4–15)
NEUTROPHILS # BLD AUTO: 2.9 K/UL (ref 1.8–7.7)
NEUTROPHILS NFR BLD: 56.1 % (ref 38–73)
NITRITE UR QL STRIP: NEGATIVE
NRBC BLD-RTO: 0 /100 WBC
OPIATES UR QL SCN: ABNORMAL
PCP UR QL SCN>25 NG/ML: NEGATIVE
PH UR STRIP: 8 [PH] (ref 5–8)
PLATELET # BLD AUTO: 306 K/UL (ref 150–450)
PMV BLD AUTO: 8.7 FL (ref 9.2–12.9)
POTASSIUM SERPL-SCNC: 3.5 MMOL/L (ref 3.5–5.1)
PROT SERPL-MCNC: 8.4 G/DL (ref 6–8.4)
PROT UR QL STRIP: NEGATIVE
PROTHROMBIN TIME: 12.7 SEC (ref 11.4–13.7)
RBC # BLD AUTO: 5.18 M/UL (ref 4.6–6.2)
SALICYLATES SERPL-MCNC: <4 MG/DL (ref 15–30)
SAMPLE: NORMAL
SARS-COV-2 RDRP RESP QL NAA+PROBE: NEGATIVE
SODIUM SERPL-SCNC: 137 MMOL/L (ref 136–145)
SP GR UR STRIP: 1.02 (ref 1–1.03)
TOXICOLOGY INFORMATION: ABNORMAL
TROPONIN I SERPL DL<=0.01 NG/ML-MCNC: <0.03 NG/ML
URN SPEC COLLECT METH UR: ABNORMAL
UROBILINOGEN UR STRIP-ACNC: NEGATIVE EU/DL
WBC # BLD AUTO: 5.22 K/UL (ref 3.9–12.7)

## 2022-02-11 PROCEDURE — 85025 COMPLETE CBC W/AUTO DIFF WBC: CPT | Performed by: NURSE PRACTITIONER

## 2022-02-11 PROCEDURE — 83690 ASSAY OF LIPASE: CPT | Performed by: NURSE PRACTITIONER

## 2022-02-11 PROCEDURE — 85610 PROTHROMBIN TIME: CPT | Performed by: NURSE PRACTITIONER

## 2022-02-11 PROCEDURE — 25000003 PHARM REV CODE 250: Performed by: NURSE PRACTITIONER

## 2022-02-11 PROCEDURE — 82077 ASSAY SPEC XCP UR&BREATH IA: CPT | Mod: 91 | Performed by: EMERGENCY MEDICINE

## 2022-02-11 PROCEDURE — 25500020 PHARM REV CODE 255: Performed by: NURSE PRACTITIONER

## 2022-02-11 PROCEDURE — 80053 COMPREHEN METABOLIC PANEL: CPT | Performed by: NURSE PRACTITIONER

## 2022-02-11 PROCEDURE — 63600175 PHARM REV CODE 636 W HCPCS: Performed by: NURSE PRACTITIONER

## 2022-02-11 PROCEDURE — 82550 ASSAY OF CK (CPK): CPT | Performed by: NURSE PRACTITIONER

## 2022-02-11 PROCEDURE — 93010 EKG 12-LEAD: ICD-10-PCS | Mod: ,,, | Performed by: SPECIALIST

## 2022-02-11 PROCEDURE — 81003 URINALYSIS AUTO W/O SCOPE: CPT | Performed by: NURSE PRACTITIONER

## 2022-02-11 PROCEDURE — 80307 DRUG TEST PRSMV CHEM ANLYZR: CPT | Performed by: NURSE PRACTITIONER

## 2022-02-11 PROCEDURE — U0002 COVID-19 LAB TEST NON-CDC: HCPCS | Performed by: NURSE PRACTITIONER

## 2022-02-11 PROCEDURE — 99285 EMERGENCY DEPT VISIT HI MDM: CPT | Mod: 25

## 2022-02-11 PROCEDURE — 63600175 PHARM REV CODE 636 W HCPCS: Performed by: EMERGENCY MEDICINE

## 2022-02-11 PROCEDURE — 82077 ASSAY SPEC XCP UR&BREATH IA: CPT | Mod: 91 | Performed by: NURSE PRACTITIONER

## 2022-02-11 PROCEDURE — 25000003 PHARM REV CODE 250: Performed by: EMERGENCY MEDICINE

## 2022-02-11 PROCEDURE — 84484 ASSAY OF TROPONIN QUANT: CPT | Performed by: NURSE PRACTITIONER

## 2022-02-11 PROCEDURE — 93010 ELECTROCARDIOGRAM REPORT: CPT | Mod: ,,, | Performed by: SPECIALIST

## 2022-02-11 PROCEDURE — 96375 TX/PRO/DX INJ NEW DRUG ADDON: CPT

## 2022-02-11 PROCEDURE — 96365 THER/PROPH/DIAG IV INF INIT: CPT

## 2022-02-11 PROCEDURE — 36415 COLL VENOUS BLD VENIPUNCTURE: CPT | Performed by: NURSE PRACTITIONER

## 2022-02-11 PROCEDURE — 80179 DRUG ASSAY SALICYLATE: CPT | Performed by: NURSE PRACTITIONER

## 2022-02-11 PROCEDURE — 80143 DRUG ASSAY ACETAMINOPHEN: CPT | Performed by: NURSE PRACTITIONER

## 2022-02-11 PROCEDURE — 83735 ASSAY OF MAGNESIUM: CPT | Performed by: NURSE PRACTITIONER

## 2022-02-11 PROCEDURE — 93005 ELECTROCARDIOGRAM TRACING: CPT | Performed by: SPECIALIST

## 2022-02-11 PROCEDURE — 96366 THER/PROPH/DIAG IV INF ADDON: CPT

## 2022-02-11 RX ORDER — MORPHINE SULFATE 4 MG/ML
4 INJECTION, SOLUTION INTRAMUSCULAR; INTRAVENOUS
Status: COMPLETED | OUTPATIENT
Start: 2022-02-11 | End: 2022-02-11

## 2022-02-11 RX ORDER — LORAZEPAM 2 MG/ML
1 INJECTION INTRAMUSCULAR
Status: COMPLETED | OUTPATIENT
Start: 2022-02-11 | End: 2022-02-11

## 2022-02-11 RX ORDER — ONDANSETRON 2 MG/ML
4 INJECTION INTRAMUSCULAR; INTRAVENOUS
Status: COMPLETED | OUTPATIENT
Start: 2022-02-11 | End: 2022-02-11

## 2022-02-11 RX ADMIN — IOHEXOL 100 ML: 350 INJECTION, SOLUTION INTRAVENOUS at 06:02

## 2022-02-11 RX ADMIN — LORAZEPAM 1 MG: 2 INJECTION INTRAMUSCULAR; INTRAVENOUS at 07:02

## 2022-02-11 RX ADMIN — ONDANSETRON 4 MG: 2 INJECTION INTRAMUSCULAR; INTRAVENOUS at 06:02

## 2022-02-11 RX ADMIN — MORPHINE SULFATE 4 MG: 4 INJECTION, SOLUTION INTRAMUSCULAR; INTRAVENOUS at 06:02

## 2022-02-11 RX ADMIN — THIAMINE HYDROCHLORIDE: 100 INJECTION, SOLUTION INTRAMUSCULAR; INTRAVENOUS at 07:02

## 2022-02-11 RX ADMIN — SODIUM CHLORIDE 1000 ML: 0.9 INJECTION, SOLUTION INTRAVENOUS at 05:02

## 2022-02-11 NOTE — ED PROVIDER NOTES
Encounter Date: 2/11/2022       History     Chief Complaint   Patient presents with    Psychiatric Evaluation     Patient has hx of alcohol abuse - patient has been hearing voices telling him that he cannot be happy and they will kill him.  He states a spirit entered his house and damaged his brain. He is low risk for SI. No HI - last drink was 2 days ago. ( used)     Abdominal Pain     X 3 days - epigastric     36-year-old [place]ian speaking male ([place] language line  used to collet HPI, ROS, PE) male with a history of alcoholism, presents to the ER with epigastric abdominal pain nausea vomiting, also concerns for hearing voices that are telling him to end his life.  Patient admits to being depressed and not wanting to live anymore.  His last alcoholic drink was 2-3 days ago.  Denies blood in his stool or coffee-ground emesis.  He also states that spirits are chasing him and wanting to kill him.  He ran to a Religion and then ran back home at this appears were chasing him to the Religion as well.  He states he sees and hears the spirits talking to him and following him.  He denies illicit drug use.  He denies tremulous movements or recent seizure activity.  He denies chest pain.  He admits to shortness of breath.  He reports pain is in his epigastric area and then radiates throughout his entire abdomen.  No cough no fever no urinary symptoms no diarrhea.  He states he vomited 2-3 times today.        Review of patient's allergies indicates:  No Known Allergies  Past Medical History:   Diagnosis Date    Alcohol abuse     GERD (gastroesophageal reflux disease)     Hypertension      No past surgical history on file.  No family history on file.  Social History     Substance Use Topics    Alcohol use: Yes    Drug use: Never     Review of Systems   Constitutional: Positive for fatigue. Negative for fever.   HENT: Negative for congestion, postnasal drip, rhinorrhea, sinus pressure, sinus pain,  sneezing and sore throat.    Eyes: Negative for photophobia and visual disturbance.   Respiratory: Negative for cough, chest tightness, shortness of breath, wheezing and stridor.    Cardiovascular: Negative for chest pain, palpitations and leg swelling.   Gastrointestinal: Positive for abdominal pain, nausea and vomiting. Negative for abdominal distention, anal bleeding, blood in stool, constipation and diarrhea.   Endocrine: Negative for polydipsia, polyphagia and polyuria.   Genitourinary: Negative for decreased urine volume, difficulty urinating, dysuria, flank pain, frequency, hematuria and urgency.   Musculoskeletal: Negative for arthralgias, back pain, myalgias, neck pain and neck stiffness.   Skin: Negative for color change, rash and wound.   Allergic/Immunologic: Negative for immunocompromised state.   Neurological: Negative for dizziness, seizures, syncope, speech difficulty, weakness, light-headedness and headaches.   Hematological: Does not bruise/bleed easily.   Psychiatric/Behavioral: Positive for dysphoric mood, hallucinations, sleep disturbance and suicidal ideas. The patient is nervous/anxious.    All other systems reviewed and are negative.      Physical Exam     Initial Vitals [02/11/22 1620]   BP Pulse Resp Temp SpO2   (!) 184/113 (!) 117 18 98.4 °F (36.9 °C) 97 %      MAP       --         Physical Exam    Nursing note and vitals reviewed.  Constitutional: He appears well-developed. He is not diaphoretic. He appears distressed.   HENT:   Head: Normocephalic and atraumatic.   Eyes: Conjunctivae are normal.   Neck:   Normal range of motion.  Cardiovascular: Tachycardia present.    Pulmonary/Chest: Breath sounds normal. No respiratory distress. He has no wheezes. He has no rhonchi. He has no rales.   Abdominal: Abdomen is soft. There is generalized abdominal tenderness and tenderness in the epigastric area and periumbilical area. There is rebound.   Musculoskeletal:         General: No edema.       Cervical back: Normal range of motion.     Neurological: He is alert and oriented to person, place, and time. He has normal strength. GCS score is 15. GCS eye subscore is 4. GCS verbal subscore is 5. GCS motor subscore is 6.   Skin: Skin is warm and dry. Capillary refill takes less than 2 seconds. No rash noted. No erythema.   Psychiatric: His mood appears anxious. His affect is labile. His speech is rapid and/or pressured. He is actively hallucinating. Thought content is paranoid. He exhibits a depressed mood. He expresses suicidal ideation. He expresses no homicidal ideation. He expresses no suicidal plans and no homicidal plans.         ED Course   Procedures  Labs Reviewed   CBC W/ AUTO DIFFERENTIAL - Abnormal; Notable for the following components:       Result Value    MPV 8.7 (*)     All other components within normal limits   COMPREHENSIVE METABOLIC PANEL - Abnormal; Notable for the following components:    Glucose 125 (*)     AST 55 (*)     Anion Gap 18 (*)     All other components within normal limits   DRUG SCREEN PANEL, URINE EMERGENCY - Abnormal; Notable for the following components:    Opiate Scrn, Ur Presumptive Positive (*)     Creatinine, Urine 10.0 (*)     All other components within normal limits    Narrative:     Specimen Source->Urine   LIPASE - Abnormal; Notable for the following components:    Lipase 84 (*)     All other components within normal limits   URINALYSIS, REFLEX TO URINE CULTURE - Abnormal; Notable for the following components:    Color, UA Colorless (*)     All other components within normal limits    Narrative:     Specimen Source->Urine   ALCOHOL,MEDICAL (ETHANOL) - Abnormal; Notable for the following components:    Alcohol, Serum 277 (*)     All other components within normal limits   SALICYLATE LEVEL - Abnormal; Notable for the following components:    Salicylate Lvl <4.0 (*)     All other components within normal limits   CK - Abnormal; Notable for the following components:      (*)     All other components within normal limits   ALCOHOL,MEDICAL (ETHANOL) - Abnormal; Notable for the following components:    Alcohol, Serum 109 (*)     All other components within normal limits   ALCOHOL,MEDICAL (ETHANOL) - Abnormal; Notable for the following components:    Alcohol, Serum 87 (*)     All other components within normal limits   MAGNESIUM   PROTIME-INR   SARS-COV-2 RNA AMPLIFICATION, QUAL   ACETAMINOPHEN LEVEL   TROPONIN I   TROPONIN I   ISTAT CREATININE     Results for orders placed or performed during the hospital encounter of 02/11/22   CBC auto differential   Result Value Ref Range    WBC 5.22 3.90 - 12.70 K/uL    RBC 5.18 4.60 - 6.20 M/uL    Hemoglobin 15.6 14.0 - 18.0 g/dL    Hematocrit 43.8 40.0 - 54.0 %    MCV 85 82 - 98 fL    MCH 30.1 27.0 - 31.0 pg    MCHC 35.6 32.0 - 36.0 g/dL    RDW 13.2 11.5 - 14.5 %    Platelets 306 150 - 450 K/uL    MPV 8.7 (L) 9.2 - 12.9 fL    Immature Granulocytes 0.2 0.0 - 0.5 %    Gran # (ANC) 2.9 1.8 - 7.7 K/uL    Immature Grans (Abs) 0.01 0.00 - 0.04 K/uL    Lymph # 1.9 1.0 - 4.8 K/uL    Mono # 0.3 0.3 - 1.0 K/uL    Eos # 0.0 0.0 - 0.5 K/uL    Baso # 0.03 0.00 - 0.20 K/uL    nRBC 0 0 /100 WBC    Gran % 56.1 38.0 - 73.0 %    Lymph % 36.8 18.0 - 48.0 %    Mono % 5.9 4.0 - 15.0 %    Eosinophil % 0.4 0.0 - 8.0 %    Basophil % 0.6 0.0 - 1.9 %    Differential Method Automated    Comprehensive metabolic panel   Result Value Ref Range    Sodium 137 136 - 145 mmol/L    Potassium 3.5 3.5 - 5.1 mmol/L    Chloride 96 95 - 110 mmol/L    CO2 23 23 - 29 mmol/L    Glucose 125 (H) 70 - 110 mg/dL    BUN 7 6 - 20 mg/dL    Creatinine 0.7 0.5 - 1.4 mg/dL    Calcium 9.5 8.7 - 10.5 mg/dL    Total Protein 8.4 6.0 - 8.4 g/dL    Albumin 4.7 3.5 - 5.2 g/dL    Total Bilirubin 0.8 0.1 - 1.0 mg/dL    Alkaline Phosphatase 96 55 - 135 U/L    AST 55 (H) 10 - 40 U/L    ALT 34 10 - 44 U/L    Anion Gap 18 (H) 8 - 16 mmol/L    eGFR if African American >60.0 >60 mL/min/1.73 m^2    eGFR if  non African American >60.0 >60 mL/min/1.73 m^2   Drug screen panel, emergency   Result Value Ref Range    Benzodiazepines Negative Negative    Cocaine (Metab.) Negative Negative    Opiate Scrn, Ur Presumptive Positive (A) Negative    Barbiturate Screen, Ur Negative Negative    Amphetamine Screen, Ur Negative Negative    THC Negative Negative    Phencyclidine Negative Negative    Creatinine, Urine 10.0 (L) 23.0 - 375.0 mg/dL    Toxicology Information SEE COMMENT    Lipase   Result Value Ref Range    Lipase 84 (H) 4 - 60 U/L   Magnesium   Result Value Ref Range    Magnesium 2.0 1.6 - 2.6 mg/dL   Protime-INR   Result Value Ref Range    PT 12.7 11.4 - 13.7 sec    INR 1.0    Urinalysis, Reflex to Urine Culture Urine, Clean Catch    Specimen: Urine, Clean Catch   Result Value Ref Range    Specimen UA Urine, Clean Catch     Color, UA Colorless (A) Yellow, Straw, Vikki    Appearance, UA Clear Clear    pH, UA 8.0 5.0 - 8.0    Specific Gravity, UA 1.020 1.005 - 1.030    Protein, UA Negative Negative    Glucose, UA Negative Negative    Ketones, UA Negative Negative    Bilirubin (UA) Negative Negative    Occult Blood UA Negative Negative    Nitrite, UA Negative Negative    Urobilinogen, UA Negative Negative EU/dL    Leukocytes, UA Negative Negative   Ethanol   Result Value Ref Range    Alcohol, Serum 277 (H) <10 mg/dL   COVID-19 Rapid Screening   Result Value Ref Range    SARS-CoV-2 RNA, Amplification, Qual Negative Negative   Salicylate level   Result Value Ref Range    Salicylate Lvl <4.0 (L) 15.0 - 30.0 mg/dL   Acetaminophen level   Result Value Ref Range    Acetaminophen (Tylenol), Serum <10.0 10.0 - 20.0 ug/mL   Troponin I   Result Value Ref Range    Troponin I <0.030 <=0.040 ng/mL   CPK   Result Value Ref Range     (H) 20 - 200 U/L   Ethanol   Result Value Ref Range    Alcohol, Serum 109 (H) <10 mg/dL   Ethanol   Result Value Ref Range    Alcohol, Serum 87 (H) <10 mg/dL   ISTAT CREATININE   Result Value Ref Range     POC Creatinine 1.0 0.5 - 1.4 mg/dL    Sample VENOUS      Imaging Results          CT Abdomen Pelvis With Contrast (Final result)  Result time 02/11/22 18:21:06    Final result by Gilberto Bennett MD (02/11/22 18:21:06)                 Narrative:    CMS MANDATED QUALITY DATA - CT RADIATION - 436    All CT scans at this facility utilize dose modulation, iterative reconstruction, and/or weight based dosing when appropriate to reduce radiation dose to as low as reasonably achievable.        Reason: Abdominal pain, acute, nonlocalized Attempted twice b/c pt would not hold his breath. Pt also non-English speaking.    TECHNIQUE: CT abdomen and pelvis with 100 mL Omnipaque 350.    COMPARISON: [facility] 11/8/2021    CT ABDOMEN:  Partially visualized lung bases are clear.    Liver, gallbladder, pancreas, spleen, adrenals, and kidneys are normal. Aorta is normal.    No intestinal abnormality. A normal appendix is present. Repetitive patient motion does slightly degrade image quality and limits sensitivity of evaluation.    No acute osseous abnormality.    CT PELVIS:  Bladder is normal. No free pelvic fluid.    IMPRESSION:  No acute findings throughout the abdomen and pelvis.    Electronically signed by:  Gilberto Bennett MD  2/11/2022 6:21 PM CST Workstation: [id number]                            Results for orders placed or performed during the hospital encounter of 02/11/22   CBC auto differential   Result Value Ref Range    WBC 5.22 3.90 - 12.70 K/uL    RBC 5.18 4.60 - 6.20 M/uL    Hemoglobin 15.6 14.0 - 18.0 g/dL    Hematocrit 43.8 40.0 - 54.0 %    MCV 85 82 - 98 fL    MCH 30.1 27.0 - 31.0 pg    MCHC 35.6 32.0 - 36.0 g/dL    RDW 13.2 11.5 - 14.5 %    Platelets 306 150 - 450 K/uL    MPV 8.7 (L) 9.2 - 12.9 fL    Immature Granulocytes 0.2 0.0 - 0.5 %    Gran # (ANC) 2.9 1.8 - 7.7 K/uL    Immature Grans (Abs) 0.01 0.00 - 0.04 K/uL    Lymph # 1.9 1.0 - 4.8 K/uL    Mono # 0.3 0.3 - 1.0 K/uL    Eos # 0.0 0.0 - 0.5 K/uL    Brunao # 0.03 0.00  - 0.20 K/uL    nRBC 0 0 /100 WBC    Gran % 56.1 38.0 - 73.0 %    Lymph % 36.8 18.0 - 48.0 %    Mono % 5.9 4.0 - 15.0 %    Eosinophil % 0.4 0.0 - 8.0 %    Basophil % 0.6 0.0 - 1.9 %    Differential Method Automated    Comprehensive metabolic panel   Result Value Ref Range    Sodium 137 136 - 145 mmol/L    Potassium 3.5 3.5 - 5.1 mmol/L    Chloride 96 95 - 110 mmol/L    CO2 23 23 - 29 mmol/L    Glucose 125 (H) 70 - 110 mg/dL    BUN 7 6 - 20 mg/dL    Creatinine 0.7 0.5 - 1.4 mg/dL    Calcium 9.5 8.7 - 10.5 mg/dL    Total Protein 8.4 6.0 - 8.4 g/dL    Albumin 4.7 3.5 - 5.2 g/dL    Total Bilirubin 0.8 0.1 - 1.0 mg/dL    Alkaline Phosphatase 96 55 - 135 U/L    AST 55 (H) 10 - 40 U/L    ALT 34 10 - 44 U/L    Anion Gap 18 (H) 8 - 16 mmol/L    eGFR if African American >60.0 >60 mL/min/1.73 m^2    eGFR if non African American >60.0 >60 mL/min/1.73 m^2   Drug screen panel, emergency   Result Value Ref Range    Benzodiazepines Negative Negative    Cocaine (Metab.) Negative Negative    Opiate Scrn, Ur Presumptive Positive (A) Negative    Barbiturate Screen, Ur Negative Negative    Amphetamine Screen, Ur Negative Negative    THC Negative Negative    Phencyclidine Negative Negative    Creatinine, Urine 10.0 (L) 23.0 - 375.0 mg/dL    Toxicology Information SEE COMMENT    Lipase   Result Value Ref Range    Lipase 84 (H) 4 - 60 U/L   Magnesium   Result Value Ref Range    Magnesium 2.0 1.6 - 2.6 mg/dL   Protime-INR   Result Value Ref Range    PT 12.7 11.4 - 13.7 sec    INR 1.0    Urinalysis, Reflex to Urine Culture Urine, Clean Catch    Specimen: Urine, Clean Catch   Result Value Ref Range    Specimen UA Urine, Clean Catch     Color, UA Colorless (A) Yellow, Straw, Vikki    Appearance, UA Clear Clear    pH, UA 8.0 5.0 - 8.0    Specific Gravity, UA 1.020 1.005 - 1.030    Protein, UA Negative Negative    Glucose, UA Negative Negative    Ketones, UA Negative Negative    Bilirubin (UA) Negative Negative    Occult Blood UA Negative  Negative    Nitrite, UA Negative Negative    Urobilinogen, UA Negative Negative EU/dL    Leukocytes, UA Negative Negative   Ethanol   Result Value Ref Range    Alcohol, Serum 277 (H) <10 mg/dL   COVID-19 Rapid Screening   Result Value Ref Range    SARS-CoV-2 RNA, Amplification, Qual Negative Negative   Salicylate level   Result Value Ref Range    Salicylate Lvl <4.0 (L) 15.0 - 30.0 mg/dL   Acetaminophen level   Result Value Ref Range    Acetaminophen (Tylenol), Serum <10.0 10.0 - 20.0 ug/mL   Troponin I   Result Value Ref Range    Troponin I <0.030 <=0.040 ng/mL   CPK   Result Value Ref Range     (H) 20 - 200 U/L   Ethanol   Result Value Ref Range    Alcohol, Serum 109 (H) <10 mg/dL   Ethanol   Result Value Ref Range    Alcohol, Serum 87 (H) <10 mg/dL   ISTAT CREATININE   Result Value Ref Range    POC Creatinine 1.0 0.5 - 1.4 mg/dL    Sample VENOUS      Imaging Results          CT Abdomen Pelvis With Contrast (Final result)  Result time 02/11/22 18:21:06    Final result by Gilberto Bennett MD (02/11/22 18:21:06)                 Narrative:    CMS MANDATED QUALITY DATA - CT RADIATION - 436    All CT scans at this facility utilize dose modulation, iterative reconstruction, and/or weight based dosing when appropriate to reduce radiation dose to as low as reasonably achievable.        Reason: Abdominal pain, acute, nonlocalized Attempted twice b/c pt would not hold his breath. Pt also non-English speaking.    TECHNIQUE: CT abdomen and pelvis with 100 mL Omnipaque 350.    COMPARISON: CTA 11/8/2021    CT ABDOMEN:  Partially visualized lung bases are clear.    Liver, gallbladder, pancreas, spleen, adrenals, and kidneys are normal. Aorta is normal.    No intestinal abnormality. A normal appendix is present. Repetitive patient motion does slightly degrade image quality and limits sensitivity of evaluation.    No acute osseous abnormality.    CT PELVIS:  Bladder is normal. No free pelvic fluid.    IMPRESSION:  No acute  findings throughout the abdomen and pelvis.    Electronically signed by:  Gilberto Bennett MD  2/11/2022 6:21 PM CST Workstation: [id number]                                   ECG Results          EKG 12-lead (In process)  Result time 02/12/22 10:51:39    In process by Interface, Lab In [facility] (02/12/22 10:51:39)                 Narrative:    Test Reason : R07.9,    Vent. Rate : 106 BPM     Atrial Rate : 106 BPM     P-R Int : 144 ms          QRS Dur : 090 ms      QT Int : 336 ms       P-R-T Axes : 048 068 058 degrees     QTc Int : 446 ms    Sinus tachycardia  Minimal voltage criteria for LVH, may be normal variant ( Sokolow-Darnell )  Borderline Abnormal ECG  When compared with ECG of 11-FEB-2022 16:54,  No significant change was found    Referred By: [email or web address]   SELF           Confirmed By:                              EKG 12-lead (In process)  Result time 02/12/22 10:52:56    In process by Interface, Lab In [facility] (02/12/22 10:52:56)                 Narrative:    Test Reason : R10.9,    Vent. Rate : 108 BPM     Atrial Rate : 108 BPM     P-R Int : 136 ms          QRS Dur : 084 ms      QT Int : 316 ms       P-R-T Axes : 051 086 063 degrees     QTc Int : 423 ms    Sinus tachycardia  Otherwise normal ECG  When compared with ECG of 23-JAN-2022 09:01,  No significant change was found    Referred By: [email or web address]   SELF           Confirmed By:                             Imaging Results          CT Abdomen Pelvis With Contrast (Final result)  Result time 02/11/22 18:21:06    Final result by Gilberto Bennett MD (02/11/22 18:21:06)                 Narrative:    CMS MANDATED QUALITY DATA - CT RADIATION - 436    All CT scans at this facility utilize dose modulation, iterative reconstruction, and/or weight based dosing when appropriate to reduce radiation dose to as low as reasonably achievable.        Reason: Abdominal pain, acute, nonlocalized Attempted twice b/c pt would not hold his breath. Pt also non-English  speaking.    TECHNIQUE: CT abdomen and pelvis with 100 mL Omnipaque 350.    COMPARISON: CTA 11/8/2021    CT ABDOMEN:  Partially visualized lung bases are clear.    Liver, gallbladder, pancreas, spleen, adrenals, and kidneys are normal. Aorta is normal.    No intestinal abnormality. A normal appendix is present. Repetitive patient motion does slightly degrade image quality and limits sensitivity of evaluation.    No acute osseous abnormality.    CT PELVIS:  Bladder is normal. No free pelvic fluid.    IMPRESSION:  No acute findings throughout the abdomen and pelvis.    Electronically signed by:  Gilberto Bennett MD  2/11/2022 6:21 PM [facility] Workstation: [id number]                               Medications   sodium chloride 0.9% bolus 1,000 mL (0 mLs Intravenous Stopped 2/11/22 1839)   iohexoL (OMNIPAQUE 350) injection 100 mL (100 mLs Intravenous Given 2/11/22 1809)   morphine injection 4 mg (4 mg Intravenous Given 2/11/22 1809)   ondansetron injection 4 mg (4 mg Intravenous Given 2/11/22 1809)   sodium chloride 0.9% 1,000 mL with mvi, adult no.4 with vit K 3,300 unit- 150 mcg/10 mL 10 mL, thiamine 100 mg, folic acid 1 mg infusion ( Intravenous Stopped 2/12/22 0530)   lorazepam injection 1 mg (1 mg Intravenous Given 2/11/22 1900)   diazePAM tablet 5 mg (5 mg Oral Given 2/12/22 0558)     Medical Decision Making:   Patient presents to the ER with epigastric pain and concerns for auditory and visual hallucinations and expressing suicidal ideations and paranoid behavior.  He has a history of alcoholism and denies drinking any alcohol in the past 3 days, however today his blood alcohol level was 277 upon arrival.  Additional labs obtained in the ER to evaluate his symptoms reveal CBC with normal white blood cell count, normal H&H, patient's chemistry is notable for normal electrolytes, glucose of 125, normal renal function, his LFTs are overall unremarkable.  He has got a normal total bilirubin and alk-phos level.  His AST is  only slightly elevated 55 ALT is normal at 34. His lipase is only slightly elevated at 84 today.  Additional labs obtained to evaluate all of his multitude of symptoms including a negative troponin, UDS is notable for positive opiates but this was after morphine was administered, normal solace tape and acetaminophen levels.  CT of his abdomen pelvis obtained to further evaluate his epigastric pain.  CT is normal without evidence of pancreatitis.  No other abnormalities or acute findings throughout the abdomen pelvis on CT imaging today.  Patient initially presented tachycardic, after 1 L IV fluid bolus, 1 L banana bag, morphine Ativan Zofran his heart rate is now normal at 79, he is calm, no tremulous movements.  We repeated his alcohol level approximately 5-1/2 hours after arrival and it is now improved now 108. He is resting comfortably in bed appears in no distress no signs of intractable pain.  Other vitals are stable.  While in the ER pec was signed by Dr. Abel due to patient's suicidal ideations as he states he no longer wants to live, and his paranoid thoughts as he feels the spirits are wanting to kill him and have been following him.  After repeat labs in evaluating patient, he is now medically cleared for transfer to a psych facility.            Attending Attestation:     Physician Attestation Statement for NP/PA:   I have conducted a face to face encounter with this patient in addition to the NP/PA, due to Medical Complexity    Other NP/PA Attestation Additions:    History of Present Illness: Patient here with reported alcohol abuse with depression, suicidal ideations   Physical Exam: Intoxicated   Medical Decision Making: Patient given banana bag Ativan to prevent there tremens he is sober at this time with repeat blood alcohol level she improved below 100 patient is medically clear and stable for inpatient psychiatric treatment             ED Course as of 02/12/22 1748   Fri Feb 11, 2022   3732  Alcohol, Serum(!): 277 [AS]   1809 Lipase(!): 84 [AS]   1809 BUN: 7 [AS]   1809 Creatinine: 0.7 [AS]   1809 WBC: 5.22 [AS]      ED Course User Index  [AS] Liat Chambers NP             Clinical Impression:   Final diagnoses:  [R10.9] Abdominal pain  [R45.851] Suicidal ideation (Primary)  [F22] Paranoid  [R44.3] Hallucinations  [F10.10] Alcohol abuse          ED Disposition Condition    Transfer to Psych Facility         ED Prescriptions     None        Follow-up Information    None          Liat Chambers NP  02/11/22 4056       Tio Abel MD  02/12/22 7459

## 2022-02-12 VITALS
HEIGHT: 64 IN | HEART RATE: 76 BPM | SYSTOLIC BLOOD PRESSURE: 168 MMHG | WEIGHT: 170 LBS | BODY MASS INDEX: 29.02 KG/M2 | OXYGEN SATURATION: 99 % | DIASTOLIC BLOOD PRESSURE: 97 MMHG | RESPIRATION RATE: 20 BRPM | TEMPERATURE: 98 F

## 2022-02-12 LAB — ETHANOL SERPL-MCNC: 87 MG/DL

## 2022-02-12 PROCEDURE — 25000003 PHARM REV CODE 250: Performed by: EMERGENCY MEDICINE

## 2022-02-12 RX ORDER — DIAZEPAM 5 MG/1
5 TABLET ORAL
Status: COMPLETED | OUTPATIENT
Start: 2022-02-12 | End: 2022-02-12

## 2022-02-12 RX ADMIN — DIAZEPAM 5 MG: 5 TABLET ORAL at 05:02

## 2022-02-12 NOTE — ED NOTES
Report called to Raghav Reeves at [facility]. Patient was accected by Dr. Campos Emerson. [phone]

## 2022-02-12 NOTE — PLAN OF CARE
Faxed packet for psychiatric placement to [facility], [facility], [facility], [facility], [facility] and [facility]. Awaiting response.

## 2022-03-04 ENCOUNTER — HOSPITAL ENCOUNTER (EMERGENCY)
Facility: HOSPITAL | Age: 37
Discharge: HOME | End: 2022-03-04
Attending: EMERGENCY MEDICINE

## 2022-03-04 VITALS
BODY MASS INDEX: 24.03 KG/M2 | HEART RATE: 60 BPM | OXYGEN SATURATION: 99 % | DIASTOLIC BLOOD PRESSURE: 75 MMHG | RESPIRATION RATE: 19 BRPM | TEMPERATURE: 98 F | WEIGHT: 140 LBS | SYSTOLIC BLOOD PRESSURE: 125 MMHG

## 2022-03-04 DIAGNOSIS — G44.001 INTRACTABLE CLUSTER HEADACHE SYNDROME, UNSPECIFIED CHRONICITY PATTERN: ICD-10-CM

## 2022-03-04 DIAGNOSIS — F10.930 ALCOHOL WITHDRAWAL SYNDROME WITHOUT COMPLICATION: ICD-10-CM

## 2022-03-04 DIAGNOSIS — K21.00 GASTROESOPHAGEAL REFLUX DISEASE WITH ESOPHAGITIS WITHOUT HEMORRHAGE: Primary | ICD-10-CM

## 2022-03-04 DIAGNOSIS — R07.9 CHEST PAIN: ICD-10-CM

## 2022-03-04 LAB
ALBUMIN SERPL BCP-MCNC: 4.6 G/DL (ref 3.5–5.2)
ALP SERPL-CCNC: 82 U/L (ref 55–135)
ALT SERPL W/O P-5'-P-CCNC: 38 U/L (ref 10–44)
ANION GAP SERPL CALC-SCNC: 14 MMOL/L (ref 8–16)
AST SERPL-CCNC: 38 U/L (ref 10–40)
BASOPHILS # BLD AUTO: 0.02 K/UL (ref 0–0.2)
BASOPHILS NFR BLD: 0.4 % (ref 0–1.9)
BILIRUB SERPL-MCNC: 0.8 MG/DL (ref 0.1–1)
BNP SERPL-MCNC: 8 PG/ML (ref 0–99)
BUN SERPL-MCNC: 5 MG/DL (ref 6–20)
CALCIUM SERPL-MCNC: 9.4 MG/DL (ref 8.7–10.5)
CHLORIDE SERPL-SCNC: 94 MMOL/L (ref 95–110)
CO2 SERPL-SCNC: 25 MMOL/L (ref 23–29)
CREAT SERPL-MCNC: 0.8 MG/DL (ref 0.5–1.4)
DIFFERENTIAL METHOD: ABNORMAL
EOSINOPHIL # BLD AUTO: 0 K/UL (ref 0–0.5)
EOSINOPHIL NFR BLD: 0.2 % (ref 0–8)
ERYTHROCYTE [DISTWIDTH] IN BLOOD BY AUTOMATED COUNT: 13.4 % (ref 11.5–14.5)
EST. GFR  (AFRICAN AMERICAN): >60 ML/MIN/1.73 M^2
EST. GFR  (NON AFRICAN AMERICAN): >60 ML/MIN/1.73 M^2
GLUCOSE SERPL-MCNC: 108 MG/DL (ref 70–110)
HCT VFR BLD AUTO: 42.3 % (ref 40–54)
HGB BLD-MCNC: 14.8 G/DL (ref 14–18)
IMM GRANULOCYTES # BLD AUTO: 0.02 K/UL (ref 0–0.04)
IMM GRANULOCYTES NFR BLD AUTO: 0.4 % (ref 0–0.5)
LYMPHOCYTES # BLD AUTO: 1 K/UL (ref 1–4.8)
LYMPHOCYTES NFR BLD: 18.9 % (ref 18–48)
MCH RBC QN AUTO: 30.2 PG (ref 27–31)
MCHC RBC AUTO-ENTMCNC: 35 G/DL (ref 32–36)
MCV RBC AUTO: 86 FL (ref 82–98)
MONOCYTES # BLD AUTO: 0.5 K/UL (ref 0.3–1)
MONOCYTES NFR BLD: 9.1 % (ref 4–15)
NEUTROPHILS # BLD AUTO: 3.8 K/UL (ref 1.8–7.7)
NEUTROPHILS NFR BLD: 71 % (ref 38–73)
NRBC BLD-RTO: 0 /100 WBC
PLATELET # BLD AUTO: 433 K/UL (ref 150–450)
PMV BLD AUTO: 8.7 FL (ref 9.2–12.9)
POTASSIUM SERPL-SCNC: 3.2 MMOL/L (ref 3.5–5.1)
PROT SERPL-MCNC: 8.2 G/DL (ref 6–8.4)
RBC # BLD AUTO: 4.9 M/UL (ref 4.6–6.2)
SODIUM SERPL-SCNC: 133 MMOL/L (ref 136–145)
TROPONIN I SERPL DL<=0.01 NG/ML-MCNC: <0.03 NG/ML
TROPONIN I SERPL DL<=0.01 NG/ML-MCNC: <0.03 NG/ML
WBC # BLD AUTO: 5.28 K/UL (ref 3.9–12.7)

## 2022-03-04 PROCEDURE — 96366 THER/PROPH/DIAG IV INF ADDON: CPT

## 2022-03-04 PROCEDURE — 84484 ASSAY OF TROPONIN QUANT: CPT | Performed by: EMERGENCY MEDICINE

## 2022-03-04 PROCEDURE — 93010 EKG 12-LEAD: ICD-10-PCS | Mod: ,,, | Performed by: INTERNAL MEDICINE

## 2022-03-04 PROCEDURE — C9113 INJ PANTOPRAZOLE SODIUM, VIA: HCPCS | Performed by: EMERGENCY MEDICINE

## 2022-03-04 PROCEDURE — 93005 ELECTROCARDIOGRAM TRACING: CPT | Performed by: INTERNAL MEDICINE

## 2022-03-04 PROCEDURE — 80053 COMPREHEN METABOLIC PANEL: CPT | Performed by: EMERGENCY MEDICINE

## 2022-03-04 PROCEDURE — 25000003 PHARM REV CODE 250: Performed by: EMERGENCY MEDICINE

## 2022-03-04 PROCEDURE — 93010 ELECTROCARDIOGRAM REPORT: CPT | Mod: ,,, | Performed by: INTERNAL MEDICINE

## 2022-03-04 PROCEDURE — 83880 ASSAY OF NATRIURETIC PEPTIDE: CPT | Performed by: EMERGENCY MEDICINE

## 2022-03-04 PROCEDURE — 99284 EMERGENCY DEPT VISIT MOD MDM: CPT | Mod: 25

## 2022-03-04 PROCEDURE — 63600175 PHARM REV CODE 636 W HCPCS: Performed by: EMERGENCY MEDICINE

## 2022-03-04 PROCEDURE — 96375 TX/PRO/DX INJ NEW DRUG ADDON: CPT

## 2022-03-04 PROCEDURE — 96376 TX/PRO/DX INJ SAME DRUG ADON: CPT

## 2022-03-04 PROCEDURE — 96368 THER/DIAG CONCURRENT INF: CPT

## 2022-03-04 PROCEDURE — 85025 COMPLETE CBC W/AUTO DIFF WBC: CPT | Performed by: EMERGENCY MEDICINE

## 2022-03-04 PROCEDURE — 96365 THER/PROPH/DIAG IV INF INIT: CPT

## 2022-03-04 RX ORDER — PANTOPRAZOLE SODIUM 40 MG/10ML
40 INJECTION, POWDER, LYOPHILIZED, FOR SOLUTION INTRAVENOUS
Status: COMPLETED | OUTPATIENT
Start: 2022-03-04 | End: 2022-03-04

## 2022-03-04 RX ORDER — SUCRALFATE 1 G/1
1 TABLET ORAL 4 TIMES DAILY
Qty: 100 TABLET | Refills: 1 | Status: SHIPPED | OUTPATIENT
Start: 2022-03-04 | End: 2022-04-22

## 2022-03-04 RX ORDER — MAGNESIUM SULFATE HEPTAHYDRATE 40 MG/ML
2 INJECTION, SOLUTION INTRAVENOUS ONCE
Status: COMPLETED | OUTPATIENT
Start: 2022-03-04 | End: 2022-03-04

## 2022-03-04 RX ORDER — LORAZEPAM 2 MG/ML
1 INJECTION INTRAMUSCULAR
Status: COMPLETED | OUTPATIENT
Start: 2022-03-04 | End: 2022-03-04

## 2022-03-04 RX ORDER — ASPIRIN 325 MG
325 TABLET ORAL
Status: COMPLETED | OUTPATIENT
Start: 2022-03-04 | End: 2022-03-04

## 2022-03-04 RX ORDER — DIAZEPAM 5 MG/1
5 TABLET ORAL
Status: COMPLETED | OUTPATIENT
Start: 2022-03-04 | End: 2022-03-04

## 2022-03-04 RX ORDER — LABETALOL HYDROCHLORIDE 5 MG/ML
10 INJECTION, SOLUTION INTRAVENOUS
Status: COMPLETED | OUTPATIENT
Start: 2022-03-04 | End: 2022-03-04

## 2022-03-04 RX ORDER — ACETAMINOPHEN 500 MG
1000 TABLET ORAL
Status: COMPLETED | OUTPATIENT
Start: 2022-03-04 | End: 2022-03-04

## 2022-03-04 RX ORDER — ONDANSETRON 2 MG/ML
8 INJECTION INTRAMUSCULAR; INTRAVENOUS
Status: COMPLETED | OUTPATIENT
Start: 2022-03-04 | End: 2022-03-04

## 2022-03-04 RX ORDER — POTASSIUM CHLORIDE 750 MG/1
50 CAPSULE, EXTENDED RELEASE ORAL ONCE
Status: COMPLETED | OUTPATIENT
Start: 2022-03-04 | End: 2022-03-04

## 2022-03-04 RX ORDER — ONDANSETRON 4 MG/1
4 TABLET, FILM COATED ORAL EVERY 6 HOURS PRN
Qty: 20 TABLET | Refills: 1 | Status: SHIPPED | OUTPATIENT
Start: 2022-03-04 | End: 2022-04-22

## 2022-03-04 RX ORDER — PANTOPRAZOLE SODIUM 20 MG/1
40 TABLET, DELAYED RELEASE ORAL DAILY
Qty: 30 TABLET | Refills: 0 | Status: SHIPPED | OUTPATIENT
Start: 2022-03-04 | End: 2022-09-27

## 2022-03-04 RX ADMIN — ASPIRIN 325 MG ORAL TABLET 325 MG: 325 PILL ORAL at 07:03

## 2022-03-04 RX ADMIN — PANTOPRAZOLE SODIUM 40 MG: 40 INJECTION, POWDER, FOR SOLUTION INTRAVENOUS at 07:03

## 2022-03-04 RX ADMIN — ACETAMINOPHEN 1000 MG: 500 TABLET ORAL at 07:03

## 2022-03-04 RX ADMIN — DIAZEPAM 5 MG: 5 TABLET ORAL at 08:03

## 2022-03-04 RX ADMIN — LORAZEPAM 1 MG: 2 INJECTION INTRAMUSCULAR; INTRAVENOUS at 08:03

## 2022-03-04 RX ADMIN — ONDANSETRON 8 MG: 2 INJECTION INTRAMUSCULAR; INTRAVENOUS at 07:03

## 2022-03-04 RX ADMIN — LORAZEPAM 1 MG: 2 INJECTION INTRAMUSCULAR; INTRAVENOUS at 07:03

## 2022-03-04 RX ADMIN — FOLIC ACID: 5 INJECTION, SOLUTION INTRAMUSCULAR; INTRAVENOUS; SUBCUTANEOUS at 08:03

## 2022-03-04 RX ADMIN — LABETALOL HYDROCHLORIDE 10 MG: 5 INJECTION INTRAVENOUS at 07:03

## 2022-03-04 RX ADMIN — POTASSIUM CHLORIDE 50 MEQ: 750 CAPSULE, EXTENDED RELEASE ORAL at 08:03

## 2022-03-04 RX ADMIN — MAGNESIUM SULFATE IN WATER 2 G: 40 INJECTION, SOLUTION INTRAVENOUS at 08:03

## 2022-03-05 NOTE — DISCHARGE INSTRUCTIONS
Drink lots of fluids.  Rest.  Return to not drink alcohol in excess.  Return to emergency department for worsening symptoms or any problems

## 2022-03-05 NOTE — ED PROVIDER NOTES
Encounter Date: 3/4/2022       History     Chief Complaint   Patient presents with    Chest Pain     headache     36-year-old male presented emergency department complaining of nausea and vomiting since yesterday and also having burning chest pain.  Patient also has a headache.  Patient drinks alcohol regularly and did not drink alcohol today and having shaking of the extremities.  Denies dysuria or hematuria weakness or numbness or fever chills.   used to get history.  Patient describes headache as 8/10 and chest pain as burning and moderate and does not radiate anywhere and comes from epigastric stomach into the chest        Review of patient's allergies indicates:  No Known Allergies  Past Medical History:   Diagnosis Date    Alcohol abuse     GERD (gastroesophageal reflux disease)     Hypertension      No past surgical history on file.  No family history on file.  Social History     Substance Use Topics    Alcohol use: Yes    Drug use: Never     Review of Systems   Constitutional: Negative.    HENT: Negative.    Eyes: Negative.    Respiratory: Negative.    Cardiovascular: Positive for chest pain.   Gastrointestinal: Positive for abdominal pain.   Endocrine: Negative.    Genitourinary: Negative.    Musculoskeletal: Negative.    Skin: Negative.    Allergic/Immunologic: Negative.    Neurological: Positive for headaches.   Hematological: Negative.    Psychiatric/Behavioral: Negative.    All other systems reviewed and are negative.      Physical Exam     Initial Vitals [03/04/22 1741]   BP Pulse Resp Temp SpO2   (!) 190/106 71 17 98.1 °F (36.7 °C) 99 %      MAP       --         Physical Exam    Nursing note and vitals reviewed.  Constitutional: He appears well-developed and well-nourished.   HENT:   Head: Normocephalic and atraumatic.   Nose: Nose normal.   Eyes: Conjunctivae and EOM are normal.   Neck: No tracheal deviation present.   Normal range of motion.  Cardiovascular: Normal rate,  regular rhythm, normal heart sounds and intact distal pulses. Exam reveals no friction rub.    No murmur heard.  Pulmonary/Chest: Breath sounds normal. No respiratory distress. He has no wheezes. He has no rales.   Abdominal: Abdomen is soft. He exhibits no distension. There is no abdominal tenderness.   Musculoskeletal:         General: Normal range of motion.      Cervical back: Normal range of motion.     Neurological: He is alert and oriented to person, place, and time. He has normal strength. No cranial nerve deficit or sensory deficit. GCS score is 15. GCS eye subscore is 4. GCS verbal subscore is 5. GCS motor subscore is 6.   Skin: Skin is warm and dry. Capillary refill takes less than 2 seconds.   Psychiatric: He has a normal mood and affect. Thought content normal.         ED Course   Procedures  Labs Reviewed   CBC W/ AUTO DIFFERENTIAL - Abnormal; Notable for the following components:       Result Value    MPV 8.7 (*)     All other components within normal limits   COMPREHENSIVE METABOLIC PANEL - Abnormal; Notable for the following components:    Sodium 133 (*)     Potassium 3.2 (*)     Chloride 94 (*)     BUN 5 (*)     All other components within normal limits   TROPONIN I   B-TYPE NATRIURETIC PEPTIDE   TROPONIN I     EKG Readings: (Independently Interpreted)   Initial Reading: No STEMI. Rhythm: Normal Sinus Rhythm. Ectopy: No Ectopy. Conduction: Normal.       Imaging Results          CT Head Without Contrast (Final result)  Result time 03/04/22 19:38:53    Final result by Gera Medellin MD (03/04/22 19:38:53)                 Narrative:    CMS MANDATED QUALITY DATA - CT RADIATION  436    All CT scans at this facility utilize dose modulation, iterative reconstruction, and/or weight based dosing when appropriate to reduce radiation dose to as low as reasonably achievable.    CT HEAD WITHOUT IV CONTRAST    CLINICAL HISTORY:  36 years Male Headache, new or worsening, neuro deficit (Age  19-49y)    COMPARISON: CT head January 23, 2022    FINDINGS: There is no acute intracranial hemorrhage, midline shift, or mass effect. Ventricles and sulci are normal in size. Gray-white differentiation is maintained. Cerebellar hemispheres and brainstem are unremarkable.    No calvarial fracture or aggressive lesion is seen. Visualized paranasal sinuses and mastoid air cells are clear.    Impression: No CT evidence of acute intracranial pathology.    Electronically signed by:  Gera Medellin MD  3/4/2022 7:38 PM CST Workstation: [id number]                             X-Ray Chest AP Portable (Final result)  Result time 03/04/22 18:38:35    Final result by Gera Medellin MD (03/04/22 18:38:35)                 Narrative:    XR CHEST 1 VIEW    CLINICAL HISTORY:  36 years Male Chest Pain    COMPARISON: January 25, 2022    FINDINGS: Cardiac silhouette size is within normal limits. No confluent airspace disease. No large pleural effusion or pneumothorax. No acute osseous abnormality.    IMPRESSION: No acute pulmonary process.    Electronically signed by:  Gera Medellin MD  3/4/2022 6:38 PM CST Workstation: [id number]                               Medications   sodium chloride 0.9% 1,000 mL with mvi, adult no.4 with vit K 3,300 unit- 150 mcg/10 mL 10 mL, thiamine 100 mg, folic acid 1 mg infusion (has no administration in time range)   potassium chloride CR capsule 50 mEq (has no administration in time range)   magnesium sulfate 2g in water 50mL IVPB (premix) (has no administration in time range)   lorazepam injection 1 mg (has no administration in time range)   diazePAM tablet 5 mg (has no administration in time range)   aspirin tablet 325 mg (325 mg Oral Given 3/4/22 1913)   labetaloL injection 10 mg (10 mg Intravenous Given 3/4/22 1912)   acetaminophen tablet 1,000 mg (1,000 mg Oral Given 3/4/22 1913)   lorazepam injection 1 mg (1 mg Intravenous Given 3/4/22 1912)   ondansetron injection 8 mg (8 mg Intravenous Given  3/4/22 1912)   pantoprazole injection 40 mg (40 mg Intravenous Given 3/4/22 1912)     Medical Decision Making:   Differential Diagnosis:   Patient with epigastric pain radiating into the chest and nausea and vomiting.  Patient having shakes.  Patient's symptoms likely secondary to alcohol withdrawal and alcoholic gastritis.  Symptoms improved with treatment.  Hypokalemia treated.  IV fluids given.  Patient felt better after treatment.  Benzodiazepines given for alcohol withdrawal and as feeling better head CT reviewed and no acute findings detected and as feeling better discharged with instructions and follow up with primary care.  Patient advised not to drink alcohol in excess.  Clinical Tests:   Lab Tests: Reviewed  Radiological Study: Reviewed  Medical Tests: Reviewed                      Clinical Impression:   Final diagnoses:  [R07.9] Chest pain  [K21.00] Gastroesophageal reflux disease with esophagitis without hemorrhage (Primary)  [F10.230] Alcohol withdrawal syndrome without complication  [G44.001] Intractable cluster headache syndrome, unspecified chronicity pattern          ED Disposition Condition    Discharge Stable        ED Prescriptions     Medication Sig Dispense Start Date End Date Auth. Provider    pantoprazole (PROTONIX) 20 MG tablet Take 2 tablets (40 mg total) by mouth once daily. 30 tablet 3/4/2022 4/3/2022 Ivory Murphy MD    ondansetron (ZOFRAN) 4 MG tablet Take 1 tablet (4 mg total) by mouth every 6 (six) hours as needed. 20 tablet 3/4/2022 3/4/2023 Ivory Murphy MD    sucralfate (CARAFATE) 1 gram tablet Take 1 tablet (1 g total) by mouth 4 (four) times daily. 100 tablet 3/4/2022  Ivory Murphy MD        Follow-up Information     Follow up With Specialties Details Why Contact Info    [facility]  In 2 days  [place] [address]  [phone]             Ivory Murphy MD  03/04/22 2020

## 2022-04-18 ENCOUNTER — HOSPITAL ENCOUNTER (EMERGENCY)
Facility: HOSPITAL | Age: 37
Discharge: HOME | End: 2022-04-19
Attending: EMERGENCY MEDICINE

## 2022-04-18 DIAGNOSIS — R07.9 CHEST PAIN: ICD-10-CM

## 2022-04-18 DIAGNOSIS — K85.20 ALCOHOL-INDUCED ACUTE PANCREATITIS, UNSPECIFIED COMPLICATION STATUS: Primary | ICD-10-CM

## 2022-04-18 LAB
ALBUMIN SERPL BCP-MCNC: 4.1 G/DL (ref 3.5–5.2)
ALP SERPL-CCNC: 94 U/L (ref 55–135)
ALT SERPL W/O P-5'-P-CCNC: 51 U/L (ref 10–44)
ANION GAP SERPL CALC-SCNC: 17 MMOL/L (ref 8–16)
AST SERPL-CCNC: 130 U/L (ref 10–40)
BASOPHILS # BLD AUTO: 0.02 K/UL (ref 0–0.2)
BASOPHILS NFR BLD: 0.4 % (ref 0–1.9)
BILIRUB SERPL-MCNC: 0.6 MG/DL (ref 0.1–1)
BNP SERPL-MCNC: 9 PG/ML (ref 0–99)
BUN SERPL-MCNC: 8 MG/DL (ref 6–20)
CALCIUM SERPL-MCNC: 8.3 MG/DL (ref 8.7–10.5)
CHLORIDE SERPL-SCNC: 94 MMOL/L (ref 95–110)
CO2 SERPL-SCNC: 22 MMOL/L (ref 23–29)
CREAT SERPL-MCNC: 0.8 MG/DL (ref 0.5–1.4)
DIFFERENTIAL METHOD: ABNORMAL
EOSINOPHIL # BLD AUTO: 0 K/UL (ref 0–0.5)
EOSINOPHIL NFR BLD: 0 % (ref 0–8)
ERYTHROCYTE [DISTWIDTH] IN BLOOD BY AUTOMATED COUNT: 13.8 % (ref 11.5–14.5)
EST. GFR  (AFRICAN AMERICAN): >60 ML/MIN/1.73 M^2
EST. GFR  (NON AFRICAN AMERICAN): >60 ML/MIN/1.73 M^2
GLUCOSE SERPL-MCNC: 172 MG/DL (ref 70–110)
HCT VFR BLD AUTO: 44.3 % (ref 40–54)
HGB BLD-MCNC: 15.6 G/DL (ref 14–18)
IMM GRANULOCYTES # BLD AUTO: 0.01 K/UL (ref 0–0.04)
IMM GRANULOCYTES NFR BLD AUTO: 0.2 % (ref 0–0.5)
LIPASE SERPL-CCNC: 139 U/L (ref 4–60)
LYMPHOCYTES # BLD AUTO: 1.2 K/UL (ref 1–4.8)
LYMPHOCYTES NFR BLD: 24.6 % (ref 18–48)
MCH RBC QN AUTO: 30.8 PG (ref 27–31)
MCHC RBC AUTO-ENTMCNC: 35.2 G/DL (ref 32–36)
MCV RBC AUTO: 88 FL (ref 82–98)
MONOCYTES # BLD AUTO: 0.4 K/UL (ref 0.3–1)
MONOCYTES NFR BLD: 7.6 % (ref 4–15)
NEUTROPHILS # BLD AUTO: 3.2 K/UL (ref 1.8–7.7)
NEUTROPHILS NFR BLD: 67.2 % (ref 38–73)
NRBC BLD-RTO: 0 /100 WBC
PLATELET # BLD AUTO: 220 K/UL (ref 150–450)
PMV BLD AUTO: 8.6 FL (ref 9.2–12.9)
POTASSIUM SERPL-SCNC: 3 MMOL/L (ref 3.5–5.1)
PROT SERPL-MCNC: 8 G/DL (ref 6–8.4)
RBC # BLD AUTO: 5.06 M/UL (ref 4.6–6.2)
SODIUM SERPL-SCNC: 133 MMOL/L (ref 136–145)
TROPONIN I SERPL DL<=0.01 NG/ML-MCNC: <0.03 NG/ML
WBC # BLD AUTO: 4.71 K/UL (ref 3.9–12.7)

## 2022-04-18 PROCEDURE — 83690 ASSAY OF LIPASE: CPT | Performed by: EMERGENCY MEDICINE

## 2022-04-18 PROCEDURE — 82077 ASSAY SPEC XCP UR&BREATH IA: CPT | Performed by: EMERGENCY MEDICINE

## 2022-04-18 PROCEDURE — 93010 ELECTROCARDIOGRAM REPORT: CPT | Mod: ,,, | Performed by: SPECIALIST

## 2022-04-18 PROCEDURE — 93005 ELECTROCARDIOGRAM TRACING: CPT | Performed by: SPECIALIST

## 2022-04-18 PROCEDURE — 63600175 PHARM REV CODE 636 W HCPCS: Performed by: EMERGENCY MEDICINE

## 2022-04-18 PROCEDURE — 99284 EMERGENCY DEPT VISIT MOD MDM: CPT | Mod: 25

## 2022-04-18 PROCEDURE — 93010 EKG 12-LEAD: ICD-10-PCS | Mod: ,,, | Performed by: SPECIALIST

## 2022-04-18 PROCEDURE — 96375 TX/PRO/DX INJ NEW DRUG ADDON: CPT

## 2022-04-18 PROCEDURE — 80053 COMPREHEN METABOLIC PANEL: CPT | Performed by: EMERGENCY MEDICINE

## 2022-04-18 PROCEDURE — 96374 THER/PROPH/DIAG INJ IV PUSH: CPT

## 2022-04-18 PROCEDURE — C9113 INJ PANTOPRAZOLE SODIUM, VIA: HCPCS | Performed by: EMERGENCY MEDICINE

## 2022-04-18 PROCEDURE — 83880 ASSAY OF NATRIURETIC PEPTIDE: CPT | Performed by: EMERGENCY MEDICINE

## 2022-04-18 PROCEDURE — 85025 COMPLETE CBC W/AUTO DIFF WBC: CPT | Performed by: EMERGENCY MEDICINE

## 2022-04-18 PROCEDURE — 84484 ASSAY OF TROPONIN QUANT: CPT | Performed by: EMERGENCY MEDICINE

## 2022-04-18 RX ORDER — PANTOPRAZOLE SODIUM 40 MG/10ML
40 INJECTION, POWDER, LYOPHILIZED, FOR SOLUTION INTRAVENOUS
Status: COMPLETED | OUTPATIENT
Start: 2022-04-18 | End: 2022-04-18

## 2022-04-18 RX ADMIN — PANTOPRAZOLE SODIUM 40 MG: 40 INJECTION, POWDER, FOR SOLUTION INTRAVENOUS at 10:04

## 2022-04-19 VITALS
RESPIRATION RATE: 20 BRPM | SYSTOLIC BLOOD PRESSURE: 146 MMHG | OXYGEN SATURATION: 96 % | BODY MASS INDEX: 24.03 KG/M2 | WEIGHT: 140 LBS | TEMPERATURE: 99 F | DIASTOLIC BLOOD PRESSURE: 91 MMHG | HEART RATE: 102 BPM

## 2022-04-19 LAB
ETHANOL SERPL-MCNC: 332 MG/DL
TROPONIN I SERPL DL<=0.01 NG/ML-MCNC: <0.03 NG/ML

## 2022-04-19 PROCEDURE — 25000003 PHARM REV CODE 250: Performed by: EMERGENCY MEDICINE

## 2022-04-19 PROCEDURE — 63600175 PHARM REV CODE 636 W HCPCS: Performed by: EMERGENCY MEDICINE

## 2022-04-19 PROCEDURE — 84484 ASSAY OF TROPONIN QUANT: CPT | Performed by: EMERGENCY MEDICINE

## 2022-04-19 PROCEDURE — 36415 COLL VENOUS BLD VENIPUNCTURE: CPT | Performed by: EMERGENCY MEDICINE

## 2022-04-19 RX ORDER — HYDROMORPHONE HYDROCHLORIDE 1 MG/ML
0.5 INJECTION, SOLUTION INTRAMUSCULAR; INTRAVENOUS; SUBCUTANEOUS
Status: COMPLETED | OUTPATIENT
Start: 2022-04-19 | End: 2022-04-19

## 2022-04-19 RX ORDER — ONDANSETRON 2 MG/ML
4 INJECTION INTRAMUSCULAR; INTRAVENOUS
Status: COMPLETED | OUTPATIENT
Start: 2022-04-19 | End: 2022-04-19

## 2022-04-19 RX ORDER — OXYCODONE AND ACETAMINOPHEN 5; 325 MG/1; MG/1
1 TABLET ORAL EVERY 6 HOURS PRN
Qty: 12 TABLET | Refills: 0 | Status: SHIPPED | OUTPATIENT
Start: 2022-04-19 | End: 2022-04-22

## 2022-04-19 RX ORDER — ONDANSETRON 4 MG/1
4 TABLET, ORALLY DISINTEGRATING ORAL EVERY 6 HOURS PRN
Qty: 12 TABLET | Refills: 0 | Status: SHIPPED | OUTPATIENT
Start: 2022-04-19 | End: 2022-04-22

## 2022-04-19 RX ADMIN — POTASSIUM BICARBONATE 50 MEQ: 977.5 TABLET, EFFERVESCENT ORAL at 12:04

## 2022-04-19 RX ADMIN — POTASSIUM BICARBONATE 50 MEQ: 977.5 TABLET, EFFERVESCENT ORAL at 06:04

## 2022-04-19 RX ADMIN — HYDROMORPHONE HYDROCHLORIDE 0.5 MG: 1 INJECTION, SOLUTION INTRAMUSCULAR; INTRAVENOUS; SUBCUTANEOUS at 12:04

## 2022-04-19 RX ADMIN — ONDANSETRON 4 MG: 2 INJECTION INTRAMUSCULAR; INTRAVENOUS at 12:04

## 2022-04-19 NOTE — DISCHARGE INSTRUCTIONS
Please avoid drinking excess alcohol.  Please follow-up with a doctor.    [facility] PATIENCES MEDICINES.!!!!!

## 2022-04-19 NOTE — ED PROVIDER NOTES
"Encounter Date: 4/18/2022       History     Chief Complaint   Patient presents with    Chest Pain     Patient states he started having chest pain after drinking "a lot of beer" states last beer was one hour ago.       Chief complaint is chest pain.  This patient states he has a strong chest pain that is intermittent in nature.  He while being interviewed would intermittently clutches chest and seemed to be upset with tears as I and hyperventilating for short period time then coming down.  He does have history of anxiety and opiate her problems.  He has gone to several facilities in the past.  He states he is not compliant to his medicines at the present time.  He denies any history of MI hypertension diabetes high cholesterol.        Review of patient's allergies indicates:  No Known Allergies  Past Medical History:   Diagnosis Date    Alcohol abuse     GERD (gastroesophageal reflux disease)     Hypertension      No past surgical history on file.  No family history on file.  Social History     Substance Use Topics    Alcohol use: Yes    Drug use: Never     Review of Systems   Constitutional: Negative for chills and fever.   HENT: Negative for ear pain, rhinorrhea and sore throat.    Eyes: Negative for pain and visual disturbance.   Respiratory: Positive for chest tightness. Negative for cough and shortness of breath.    Cardiovascular: Negative for chest pain and palpitations.   Gastrointestinal: Negative for abdominal pain, constipation, diarrhea, nausea and vomiting.   Genitourinary: Negative for dysuria, frequency, hematuria and urgency.   Musculoskeletal: Negative for back pain, joint swelling and myalgias.   Skin: Negative for rash.   Neurological: Negative for dizziness, seizures, weakness and headaches.   Psychiatric/Behavioral: Negative for dysphoric mood. The patient is not nervous/anxious.        Physical Exam     Initial Vitals [04/18/22 2218]   BP Pulse Resp Temp SpO2   (!) 177/109 93 18 98.6 °F " (37 °C) (!) 93 %      MAP       --         Physical Exam    Nursing note and vitals reviewed.  Constitutional: He appears well-developed and well-nourished.   HENT:   Head: Normocephalic and atraumatic.   Nose: Nose normal.   Eyes: Conjunctivae, EOM and lids are normal. Pupils are equal, round, and reactive to light.   Neck: Trachea normal. Neck supple. No thyroid mass present.   Cardiovascular: Normal rate, regular rhythm and normal heart sounds.   Pulmonary/Chest: Breath sounds normal. No respiratory distress.   Abdominal: Abdomen is soft. There is no abdominal tenderness.   Musculoskeletal:         General: Normal range of motion.      Cervical back: Neck supple.     Neurological: He is alert and oriented to person, place, and time. He has normal strength and normal reflexes. No cranial nerve deficit or sensory deficit.   Skin: Skin is warm and dry.   Psychiatric: He has a normal mood and affect. His speech is normal and behavior is normal. Judgment and thought content normal.   He denies wanting to hurt himself or anyone else. No hallucinations.          ED Course   Procedures  Labs Reviewed   CBC W/ AUTO DIFFERENTIAL - Abnormal; Notable for the following components:       Result Value    MPV 8.6 (*)     All other components within normal limits   COMPREHENSIVE METABOLIC PANEL - Abnormal; Notable for the following components:    Sodium 133 (*)     Potassium 3.0 (*)     Chloride 94 (*)     CO2 22 (*)     Glucose 172 (*)     Calcium 8.3 (*)      (*)     ALT 51 (*)     Anion Gap 17 (*)     All other components within normal limits   LIPASE - Abnormal; Notable for the following components:    Lipase 139 (*)     All other components within normal limits   ALCOHOL,MEDICAL (ETHANOL) - Abnormal; Notable for the following components:    Alcohol, Serum 332 (*)     All other components within normal limits    Narrative:        Alcohol critical result(s) called and verbal readback obtained   from Roma Marshall RN  ER  by MS1 04/19/2022 01:34   TROPONIN I   B-TYPE NATRIURETIC PEPTIDE   ALCOHOL,MEDICAL (ETHANOL)   TROPONIN I     EKG Readings: (Independently Interpreted)   EKG reveals normal sinus rhythm heart rate 88 NV interval normal no ST elevation     ECG Results          EKG 12-lead (In process)  Result time 04/19/22 05:36:36    In process by Interface, Lab In [facility] (04/19/22 05:36:36)                 Narrative:    Test Reason : R07.9,    Vent. Rate : 095 BPM     Atrial Rate : 095 BPM     P-R Int : 160 ms          QRS Dur : 106 ms      QT Int : 338 ms       P-R-T Axes : 066 107 -09 degrees     QTc Int : 424 ms    Normal sinus rhythm  Rightward axis  ST and T wave abnormality, consider inferior ischemia  Abnormal ECG  When compared with ECG of 04-MAR-2022 18:07,  Vent. rate has increased BY  32 BPM  The axis Shifted right  Non-specific change in ST segment in Inferior leads  T wave inversion now evident in Inferior leads    Referred By: [email or web address]   SELF           Confirmed By:                             Imaging Results          X-Ray Chest AP Portable (Final result)  Result time 04/19/22 06:48:24    Final result by Leopoldo Herrera IV, MD (04/19/22 06:48:24)                 Narrative:    Chest, single view    HISTORY: Chest pain.    Comparison 3/4/2022.    The radiograph is lordotically positioned. The heart and mediastinum appear unremarkable. There is no acute pulmonary vascular engorgement. The lungs are clear with no infiltrate, volume loss or pleural fluid accumulation observed.    IMPRESSION:    No acute cardiopulmonary disease.    Electronically signed by:  Leopoldo Herrera MD  4/19/2022 6:48 AM CDT Workstation: [id number]                               Medications   pantoprazole injection 40 mg (40 mg Intravenous Given 4/18/22 2238)   potassium bicarbonate disintegrating tablet 50 mEq (50 mEq Oral Given 4/19/22 0036)   HYDROmorphone injection 0.5 mg (0.5 mg Intravenous Given 4/19/22 0036)   ondansetron injection 4 mg (4  mg Intravenous Given 4/19/22 0036)   potassium bicarbonate disintegrating tablet 50 mEq (50 mEq Oral Given 4/19/22 0603)     Medical Decision Making:   ED Management:  The patient had 2-troponins.  His lipase is elevated has a diagnosis pancreatitis due to excess alcohol intake.  He was inebriated when he got here.  He was stable on discharge was given follow-up instructions.  He was given instructions to get his medicine prescriptions filled.  He was given follow-up as well.  He was also given instructions to return if any worsening symptoms.  We did use a                       Clinical Impression:   Final diagnoses:  [R07.9] Chest pain  [K85.20] Alcohol-induced acute pancreatitis, unspecified complication status (Primary)          ED Disposition Condition    Discharge Stable        ED Prescriptions     Medication Sig Dispense Start Date End Date Auth. Provider    oxyCODONE-acetaminophen (PERCOCET) 5-325 mg per tablet Take 1 tablet by mouth every 6 (six) hours as needed for Pain. 12 tablet 4/19/2022 4/22/2022 Beronica Mittal MD    ondansetron (ZOFRAN-ODT) 4 MG TbDL Take 1 tablet (4 mg total) by mouth every 6 (six) hours as needed. 12 tablet 4/19/2022  Beronica Mittal MD        Follow-up Information     Follow up With Specialties Details Why Contact [facility]  Schedule an appointment as soon as possible for a visit in 3 days  [address] [place] [id number]  [phone]             Beronica Mittal MD  04/19/22 2004

## 2022-04-22 ENCOUNTER — HOSPITAL ENCOUNTER (INPATIENT)
Facility: HOSPITAL | Age: 37
LOS: 4 days | Discharge: HOME | DRG: 897 | End: 2022-04-26
Attending: EMERGENCY MEDICINE | Admitting: HOSPITALIST
Payer: MEDICAID

## 2022-04-22 DIAGNOSIS — F10.931: ICD-10-CM

## 2022-04-22 DIAGNOSIS — R07.9 CHEST PAIN: ICD-10-CM

## 2022-04-22 DIAGNOSIS — F10.939 ALCOHOL WITHDRAWAL SYNDROME WITH COMPLICATION: ICD-10-CM

## 2022-04-22 DIAGNOSIS — K85.20 ALCOHOL-INDUCED ACUTE PANCREATITIS, UNSPECIFIED COMPLICATION STATUS: ICD-10-CM

## 2022-04-22 DIAGNOSIS — F10.931 DELIRIUM TREMENS: Primary | ICD-10-CM

## 2022-04-22 PROBLEM — R74.8 ELEVATED LIPASE: Status: ACTIVE | Noted: 2022-04-22

## 2022-04-22 LAB
ALBUMIN SERPL BCP-MCNC: 4.2 G/DL (ref 3.5–5.2)
ALP SERPL-CCNC: 76 U/L (ref 55–135)
ALT SERPL W/O P-5'-P-CCNC: 69 U/L (ref 10–44)
AMMONIA PLAS-SCNC: 33 UMOL/L (ref 10–50)
AMPHET+METHAMPHET UR QL: NEGATIVE
ANION GAP SERPL CALC-SCNC: 13 MMOL/L (ref 8–16)
APAP SERPL-MCNC: <10 UG/ML (ref 10–20)
AST SERPL-CCNC: 99 U/L (ref 10–40)
BACTERIA #/AREA URNS HPF: NEGATIVE /HPF
BARBITURATES UR QL SCN>200 NG/ML: NEGATIVE
BASOPHILS # BLD AUTO: 0.04 K/UL (ref 0–0.2)
BASOPHILS NFR BLD: 0.5 % (ref 0–1.9)
BENZODIAZ UR QL SCN>200 NG/ML: NEGATIVE
BILIRUB SERPL-MCNC: 0.7 MG/DL (ref 0.1–1)
BILIRUB UR QL STRIP: NEGATIVE
BUN SERPL-MCNC: 10 MG/DL (ref 6–20)
BZE UR QL SCN: NEGATIVE
CALCIUM SERPL-MCNC: 9.4 MG/DL (ref 8.7–10.5)
CANNABINOIDS UR QL SCN: NEGATIVE
CHLORIDE SERPL-SCNC: 98 MMOL/L (ref 95–110)
CLARITY UR: CLEAR
CO2 SERPL-SCNC: 25 MMOL/L (ref 23–29)
COLOR UR: YELLOW
CREAT SERPL-MCNC: 1 MG/DL (ref 0.5–1.4)
CREAT UR-MCNC: 77 MG/DL (ref 23–375)
DIFFERENTIAL METHOD: ABNORMAL
EOSINOPHIL # BLD AUTO: 0 K/UL (ref 0–0.5)
EOSINOPHIL NFR BLD: 0.3 % (ref 0–8)
ERYTHROCYTE [DISTWIDTH] IN BLOOD BY AUTOMATED COUNT: 13.7 % (ref 11.5–14.5)
EST. GFR  (AFRICAN AMERICAN): >60 ML/MIN/1.73 M^2
EST. GFR  (NON AFRICAN AMERICAN): >60 ML/MIN/1.73 M^2
ETHANOL SERPL-MCNC: <5 MG/DL
GLUCOSE SERPL-MCNC: 108 MG/DL (ref 70–110)
GLUCOSE UR QL STRIP: NEGATIVE
HCT VFR BLD AUTO: 44.3 % (ref 40–54)
HGB BLD-MCNC: 14.5 G/DL (ref 14–18)
HGB UR QL STRIP: NEGATIVE
HYALINE CASTS #/AREA URNS LPF: 0 /LPF
IMM GRANULOCYTES # BLD AUTO: 0.06 K/UL (ref 0–0.04)
IMM GRANULOCYTES NFR BLD AUTO: 0.8 % (ref 0–0.5)
INR PPP: 1
KETONES UR QL STRIP: NEGATIVE
LEUKOCYTE ESTERASE UR QL STRIP: NEGATIVE
LIPASE SERPL-CCNC: 177 U/L (ref 4–60)
LYMPHOCYTES # BLD AUTO: 0.7 K/UL (ref 1–4.8)
LYMPHOCYTES NFR BLD: 9 % (ref 18–48)
MAGNESIUM SERPL-MCNC: 1.8 MG/DL (ref 1.6–2.6)
MCH RBC QN AUTO: 30.7 PG (ref 27–31)
MCHC RBC AUTO-ENTMCNC: 32.7 G/DL (ref 32–36)
MCV RBC AUTO: 94 FL (ref 82–98)
MICROSCOPIC COMMENT: ABNORMAL
MONOCYTES # BLD AUTO: 0.8 K/UL (ref 0.3–1)
MONOCYTES NFR BLD: 10.8 % (ref 4–15)
NEUTROPHILS # BLD AUTO: 5.9 K/UL (ref 1.8–7.7)
NEUTROPHILS NFR BLD: 78.6 % (ref 38–73)
NITRITE UR QL STRIP: NEGATIVE
NRBC BLD-RTO: 0 /100 WBC
OPIATES UR QL SCN: NEGATIVE
PCP UR QL SCN>25 NG/ML: NEGATIVE
PH UR STRIP: 7 [PH] (ref 5–8)
PLATELET # BLD AUTO: 246 K/UL (ref 150–450)
PMV BLD AUTO: 9.7 FL (ref 9.2–12.9)
POTASSIUM SERPL-SCNC: 3.7 MMOL/L (ref 3.5–5.1)
PROT SERPL-MCNC: 8.1 G/DL (ref 6–8.4)
PROT UR QL STRIP: ABNORMAL
PROTHROMBIN TIME: 12.7 SEC (ref 11.4–13.7)
RBC # BLD AUTO: 4.73 M/UL (ref 4.6–6.2)
RBC #/AREA URNS HPF: 5 /HPF (ref 0–4)
SALICYLATES SERPL-MCNC: <4 MG/DL (ref 15–30)
SARS-COV-2 RDRP RESP QL NAA+PROBE: NEGATIVE
SODIUM SERPL-SCNC: 136 MMOL/L (ref 136–145)
SP GR UR STRIP: 1.01 (ref 1–1.03)
SQUAMOUS #/AREA URNS HPF: 0 /HPF
TOXICOLOGY INFORMATION: NORMAL
TRIGL SERPL-MCNC: 70 MG/DL (ref 30–150)
TSH SERPL DL<=0.005 MIU/L-ACNC: 1.18 UIU/ML (ref 0.34–5.6)
URN SPEC COLLECT METH UR: ABNORMAL
UROBILINOGEN UR STRIP-ACNC: NEGATIVE EU/DL
WBC # BLD AUTO: 7.53 K/UL (ref 3.9–12.7)
WBC #/AREA URNS HPF: 0 /HPF (ref 0–5)

## 2022-04-22 PROCEDURE — 25000003 PHARM REV CODE 250: Performed by: NURSE PRACTITIONER

## 2022-04-22 PROCEDURE — 81001 URINALYSIS AUTO W/SCOPE: CPT | Performed by: EMERGENCY MEDICINE

## 2022-04-22 PROCEDURE — 80143 DRUG ASSAY ACETAMINOPHEN: CPT | Performed by: EMERGENCY MEDICINE

## 2022-04-22 PROCEDURE — 25500020 PHARM REV CODE 255: Performed by: EMERGENCY MEDICINE

## 2022-04-22 PROCEDURE — 25000003 PHARM REV CODE 250: Performed by: EMERGENCY MEDICINE

## 2022-04-22 PROCEDURE — 96376 TX/PRO/DX INJ SAME DRUG ADON: CPT

## 2022-04-22 PROCEDURE — 82077 ASSAY SPEC XCP UR&BREATH IA: CPT | Performed by: EMERGENCY MEDICINE

## 2022-04-22 PROCEDURE — 85610 PROTHROMBIN TIME: CPT | Performed by: NURSE PRACTITIONER

## 2022-04-22 PROCEDURE — 12000002 HC ACUTE/MED SURGE SEMI-PRIVATE ROOM

## 2022-04-22 PROCEDURE — 83690 ASSAY OF LIPASE: CPT | Performed by: EMERGENCY MEDICINE

## 2022-04-22 PROCEDURE — 36415 COLL VENOUS BLD VENIPUNCTURE: CPT | Performed by: EMERGENCY MEDICINE

## 2022-04-22 PROCEDURE — 82140 ASSAY OF AMMONIA: CPT | Performed by: NURSE PRACTITIONER

## 2022-04-22 PROCEDURE — 99285 EMERGENCY DEPT VISIT HI MDM: CPT | Mod: 25

## 2022-04-22 PROCEDURE — 84478 ASSAY OF TRIGLYCERIDES: CPT | Performed by: NURSE PRACTITIONER

## 2022-04-22 PROCEDURE — 80307 DRUG TEST PRSMV CHEM ANLYZR: CPT | Performed by: EMERGENCY MEDICINE

## 2022-04-22 PROCEDURE — 80053 COMPREHEN METABOLIC PANEL: CPT | Performed by: EMERGENCY MEDICINE

## 2022-04-22 PROCEDURE — 84443 ASSAY THYROID STIM HORMONE: CPT | Performed by: EMERGENCY MEDICINE

## 2022-04-22 PROCEDURE — 80179 DRUG ASSAY SALICYLATE: CPT | Performed by: EMERGENCY MEDICINE

## 2022-04-22 PROCEDURE — 63600175 PHARM REV CODE 636 W HCPCS: Performed by: EMERGENCY MEDICINE

## 2022-04-22 PROCEDURE — 83735 ASSAY OF MAGNESIUM: CPT | Performed by: EMERGENCY MEDICINE

## 2022-04-22 PROCEDURE — 85025 COMPLETE CBC W/AUTO DIFF WBC: CPT | Performed by: EMERGENCY MEDICINE

## 2022-04-22 PROCEDURE — U0002 COVID-19 LAB TEST NON-CDC: HCPCS | Performed by: EMERGENCY MEDICINE

## 2022-04-22 PROCEDURE — 96374 THER/PROPH/DIAG INJ IV PUSH: CPT

## 2022-04-22 PROCEDURE — 96375 TX/PRO/DX INJ NEW DRUG ADDON: CPT

## 2022-04-22 RX ORDER — MAGNESIUM SULFATE HEPTAHYDRATE 40 MG/ML
4 INJECTION, SOLUTION INTRAVENOUS
Status: DISCONTINUED | OUTPATIENT
Start: 2022-04-22 | End: 2022-04-26 | Stop reason: HOSPADM

## 2022-04-22 RX ORDER — LORAZEPAM 2 MG/ML
1 INJECTION INTRAMUSCULAR EVERY 4 HOURS PRN
Status: DISCONTINUED | OUTPATIENT
Start: 2022-04-22 | End: 2022-04-23

## 2022-04-22 RX ORDER — POTASSIUM CHLORIDE 20 MEQ/1
20 TABLET, EXTENDED RELEASE ORAL
Status: DISCONTINUED | OUTPATIENT
Start: 2022-04-22 | End: 2022-04-26 | Stop reason: HOSPADM

## 2022-04-22 RX ORDER — PANTOPRAZOLE SODIUM 40 MG/1
40 TABLET, DELAYED RELEASE ORAL DAILY
Status: DISCONTINUED | OUTPATIENT
Start: 2022-04-23 | End: 2022-04-26 | Stop reason: HOSPADM

## 2022-04-22 RX ORDER — MAGNESIUM SULFATE HEPTAHYDRATE 40 MG/ML
2 INJECTION, SOLUTION INTRAVENOUS
Status: DISCONTINUED | OUTPATIENT
Start: 2022-04-22 | End: 2022-04-26 | Stop reason: HOSPADM

## 2022-04-22 RX ORDER — LANOLIN ALCOHOL/MO/W.PET/CERES
800 CREAM (GRAM) TOPICAL
Status: DISCONTINUED | OUTPATIENT
Start: 2022-04-22 | End: 2022-04-26 | Stop reason: HOSPADM

## 2022-04-22 RX ORDER — FOLIC ACID 1 MG/1
1 TABLET ORAL DAILY
Status: DISCONTINUED | OUTPATIENT
Start: 2022-04-23 | End: 2022-04-26 | Stop reason: HOSPADM

## 2022-04-22 RX ORDER — NALOXONE HCL 0.4 MG/ML
0.02 VIAL (ML) INJECTION
Status: DISCONTINUED | OUTPATIENT
Start: 2022-04-22 | End: 2022-04-26 | Stop reason: HOSPADM

## 2022-04-22 RX ORDER — THIAMINE HCL 100 MG
100 TABLET ORAL 3 TIMES DAILY
Status: DISCONTINUED | OUTPATIENT
Start: 2022-04-23 | End: 2022-04-26 | Stop reason: HOSPADM

## 2022-04-22 RX ORDER — ONDANSETRON 2 MG/ML
4 INJECTION INTRAMUSCULAR; INTRAVENOUS EVERY 8 HOURS PRN
Status: DISCONTINUED | OUTPATIENT
Start: 2022-04-22 | End: 2022-04-26 | Stop reason: HOSPADM

## 2022-04-22 RX ORDER — LABETALOL 100 MG/1
200 TABLET, FILM COATED ORAL EVERY 12 HOURS
Status: DISCONTINUED | OUTPATIENT
Start: 2022-04-22 | End: 2022-04-26 | Stop reason: HOSPADM

## 2022-04-22 RX ORDER — POTASSIUM CHLORIDE 20 MEQ/1
40 TABLET, EXTENDED RELEASE ORAL
Status: DISCONTINUED | OUTPATIENT
Start: 2022-04-22 | End: 2022-04-26 | Stop reason: HOSPADM

## 2022-04-22 RX ORDER — LORAZEPAM 2 MG/ML
1 INJECTION INTRAMUSCULAR
Status: COMPLETED | OUTPATIENT
Start: 2022-04-22 | End: 2022-04-22

## 2022-04-22 RX ORDER — LORAZEPAM 2 MG/ML
1 INJECTION INTRAMUSCULAR EVERY 4 HOURS PRN
Status: DISCONTINUED | OUTPATIENT
Start: 2022-04-22 | End: 2022-04-22

## 2022-04-22 RX ORDER — SODIUM CHLORIDE 0.9 % (FLUSH) 0.9 %
10 SYRINGE (ML) INJECTION EVERY 12 HOURS PRN
Status: DISCONTINUED | OUTPATIENT
Start: 2022-04-22 | End: 2022-04-26 | Stop reason: HOSPADM

## 2022-04-22 RX ORDER — MAGNESIUM SULFATE 1 G/100ML
1 INJECTION INTRAVENOUS
Status: DISCONTINUED | OUTPATIENT
Start: 2022-04-22 | End: 2022-04-26 | Stop reason: HOSPADM

## 2022-04-22 RX ORDER — LORAZEPAM 2 MG/ML
2 INJECTION INTRAMUSCULAR
Status: COMPLETED | OUTPATIENT
Start: 2022-04-22 | End: 2022-04-22

## 2022-04-22 RX ORDER — DIAZEPAM 5 MG/1
5 TABLET ORAL EVERY 6 HOURS
Status: DISCONTINUED | OUTPATIENT
Start: 2022-04-22 | End: 2022-04-23

## 2022-04-22 RX ORDER — IBUPROFEN 200 MG
24 TABLET ORAL
Status: DISCONTINUED | OUTPATIENT
Start: 2022-04-22 | End: 2022-04-26 | Stop reason: HOSPADM

## 2022-04-22 RX ORDER — IBUPROFEN 200 MG
16 TABLET ORAL
Status: DISCONTINUED | OUTPATIENT
Start: 2022-04-22 | End: 2022-04-26 | Stop reason: HOSPADM

## 2022-04-22 RX ORDER — POLYETHYLENE GLYCOL 3350 17 G/17G
17 POWDER, FOR SOLUTION ORAL 2 TIMES DAILY PRN
Status: DISCONTINUED | OUTPATIENT
Start: 2022-04-22 | End: 2022-04-26 | Stop reason: HOSPADM

## 2022-04-22 RX ORDER — GLUCAGON 1 MG
1 KIT INJECTION
Status: DISCONTINUED | OUTPATIENT
Start: 2022-04-22 | End: 2022-04-26 | Stop reason: HOSPADM

## 2022-04-22 RX ADMIN — LORAZEPAM 1 MG: 2 INJECTION INTRAMUSCULAR; INTRAVENOUS at 03:04

## 2022-04-22 RX ADMIN — IOHEXOL 100 ML: 350 INJECTION, SOLUTION INTRAVENOUS at 02:04

## 2022-04-22 RX ADMIN — DIAZEPAM 5 MG: 5 TABLET ORAL at 06:04

## 2022-04-22 RX ADMIN — LABETALOL HYDROCHLORIDE 200 MG: 100 TABLET, FILM COATED ORAL at 09:04

## 2022-04-22 RX ADMIN — LORAZEPAM 2 MG: 2 INJECTION INTRAMUSCULAR; INTRAVENOUS at 01:04

## 2022-04-22 RX ADMIN — LORAZEPAM 1 MG: 2 INJECTION INTRAMUSCULAR; INTRAVENOUS at 10:04

## 2022-04-22 RX ADMIN — THIAMINE HYDROCHLORIDE: 100 INJECTION, SOLUTION INTRAMUSCULAR; INTRAVENOUS at 02:04

## 2022-04-22 NOTE — ASSESSMENT & PLAN NOTE
Probably chronic pancreatitis without any acute changes seen on imaging  Received 1 L banana bag will give another 1 L bolus  Repeat lipase level in a.m.  Triglycerides within normal limits

## 2022-04-22 NOTE — ED PROVIDER NOTES
Encounter Date: 4/22/2022       History     Chief Complaint   Patient presents with    Abdominal Pain     Patient here via EMS called the police and told him he had a demon inside of him that he needed to get out of him patient has a history of alcohol abuse at EMS arrival he was very anxious and agitated flailing about he states that he has not drank alcohol since Sunday he is diaphoretic tachycardic and hypertensive and actively hallucinating at arrival emergency department patient states there was a demon inside of her request the  on [place] to get the demon out of him he complains of pain in his abdomen but denies any vomiting        Review of patient's allergies indicates:  No Known Allergies  Past Medical History:   Diagnosis Date    Alcohol abuse     GERD (gastroesophageal reflux disease)     Hypertension      No past surgical history on file.  No family history on file.  Social History     Substance Use Topics    Alcohol use: Yes    Drug use: Never     Review of Systems   Constitutional: Positive for diaphoresis. Negative for chills and fever.   Respiratory: Negative for shortness of breath.    Cardiovascular: Negative for chest pain.   Gastrointestinal: Positive for abdominal pain and nausea. Negative for vomiting.   Neurological: Negative for headaches.   Psychiatric/Behavioral: Positive for agitation, behavioral problems, hallucinations and sleep disturbance. Negative for self-injury. The patient is nervous/anxious and is hyperactive.        Physical Exam     Initial Vitals [04/22/22 1255]   BP Pulse Resp Temp SpO2   (!) 178/93 101 20 100.2 °F (37.9 °C) 97 %      MAP       --         Physical Exam    Constitutional: He appears well-developed and well-nourished. He is diaphoretic. He appears distressed.   HENT:   Head: Normocephalic and atraumatic.   Right Ear: External ear normal.   Left Ear: External ear normal.   Mouth/Throat: Oropharynx is clear and moist.   Eyes: Pupils are  equal, round, and reactive to light.   Neck: Neck supple.   Normal range of motion.  Cardiovascular: Regular rhythm, S1 normal, S2 normal, normal heart sounds and intact distal pulses. Tachycardia present.    Pulmonary/Chest: Breath sounds normal.   Abdominal: Abdomen is soft. Bowel sounds are normal. There is no abdominal tenderness.   Musculoskeletal:         General: Normal range of motion.      Cervical back: Normal range of motion and neck supple.     Neurological: He is alert and oriented to person, place, and time. He has normal strength. No cranial nerve deficit. GCS score is 15. GCS eye subscore is 4. GCS verbal subscore is 5. GCS motor subscore is 6.   Skin: Skin is warm. No rash noted.   Psychiatric: His behavior is normal. His mood appears anxious. His speech is rapid and/or pressured. He is actively hallucinating. Thought content is delusional. Cognition and memory are normal. He expresses inappropriate judgment. He expresses no homicidal and no suicidal ideation. He is inattentive.         ED Course   Procedures  Labs Reviewed   CBC W/ AUTO DIFFERENTIAL - Abnormal; Notable for the following components:       Result Value    Immature Granulocytes 0.8 (*)     Immature Grans (Abs) 0.06 (*)     Lymph # 0.7 (*)     Gran % 78.6 (*)     Lymph % 9.0 (*)     All other components within normal limits   COMPREHENSIVE METABOLIC PANEL - Abnormal; Notable for the following components:    AST 99 (*)     ALT 69 (*)     All other components within normal limits   URINALYSIS, REFLEX TO URINE CULTURE - Abnormal; Notable for the following components:    Protein, UA 1+ (*)     All other components within normal limits    Narrative:     Specimen Source->Urine   SALICYLATE LEVEL - Abnormal; Notable for the following components:    Salicylate Lvl <4.0 (*)     All other components within normal limits   LIPASE - Abnormal; Notable for the following components:    Lipase 177 (*)     All other components within normal limits    URINALYSIS MICROSCOPIC - Abnormal; Notable for the following components:    RBC, UA 5 (*)     All other components within normal limits    Narrative:     Specimen Source->Urine   TSH   DRUG SCREEN PANEL, URINE EMERGENCY    Narrative:     Specimen Source->Urine   ALCOHOL,MEDICAL (ETHANOL)   ACETAMINOPHEN LEVEL   SARS-COV-2 RNA AMPLIFICATION, QUAL   TRIGLYCERIDES   AMMONIA   PROTIME-INR          Imaging Results          CT Abdomen Pelvis With Contrast (In process)                CT Head Without Contrast (Final result)  Result time 04/22/22 13:53:51    Final result by Gil Wheat MD (04/22/22 13:53:51)                 Narrative:    CMS MANDATED QUALITY DATA - CT RADIATION 436    All CT scans at this facility utilize dose modulation, iterative reconstruction, and/or weight based dosing when appropriate to reduce radiation dose to as low as reasonably achievable.    CLINICAL HISTORY:  36 years (1985) Male Mental status change, unknown cause AMS    TECHNIQUE:  CT HEAD WITHOUT IV CONTRAST. 105 images obtained. Axial CT of the brain without contrast using soft tissue and bone algorithm. .    COMPARISON:  Multiple prior, most recently study from March 4, 2022    FINDINGS:  No acute intracranial hemorrhage, hydrocephalus, herniation or midline shift and the basal and suprasellar cisterns are patent. No acute skull fracture is identified. The ventricles and sulci are normal. There is normal gray white differentiation.  Orbital contents appear within normal limits. External auditory canals are unremarkable. The visualized paranasal sinuses and mastoid air cells are essentially clear.    IMPRESSION:  No acute intracranial process                  .    Electronically signed by:  Gil Wheat MD  4/22/2022 1:53 PM CDT Workstation: [id number]                               Medications   lorazepam injection 1 mg (has no administration in time range)   sodium chloride 0.9% 1,000 mL with mvi, adult no.4 with vit K  3,300 unit- 150 mcg/10 mL 10 mL, thiamine 100 mg, folic acid 1 mg infusion ( Intravenous New Bag 4/22/22 1447)   lorazepam injection 2 mg (2 mg Intravenous Given 4/22/22 1338)   iohexoL (OMNIPAQUE 350) injection 100 mL (100 mLs Intravenous Given 4/22/22 1459)     Medical Decision Making:   ED Management:  Patient is in obvious delirium tremens she has received Ativan emergency department banana bag will re-dose Ativan findings with the hospitalist will admit for further evaluation treatment                      Clinical Impression:   Final diagnoses:  [F10.231] Delirium tremens (Primary)  [K85.20] Alcohol-induced acute pancreatitis, unspecified complication status          ED Disposition Condition    Admit               Tio Abel MD  04/22/22 8846

## 2022-04-22 NOTE — SUBJECTIVE & OBJECTIVE
Past Medical History:   Diagnosis Date    Alcohol abuse     GERD (gastroesophageal reflux disease)     Hypertension        No past surgical history on file.    Review of patient's allergies indicates:  No Known Allergies    No current facility-administered medications on file prior to encounter.     Current Outpatient Medications on File Prior to Encounter   Medication Sig    [DISCONTINUED] chlordiazepoxide (LIBRIUM) 25 MG Cap Take 1 capsule (25 mg total) by mouth 4 (four) times daily. for 10 days    busPIRone (BUSPAR) 5 MG Tab Take 5 mg by mouth 2 (two) times daily.    folic acid (FOLVITE) 1 MG tablet Take 1 tablet (1 mg total) by mouth once daily.    labetaloL (NORMODYNE) 200 MG tablet Take 1 tablet (200 mg total) by mouth every 12 (twelve) hours.    pantoprazole (PROTONIX) 20 MG tablet Take 2 tablets (40 mg total) by mouth once daily.    thiamine 100 MG tablet Take 1 tablet (100 mg total) by mouth 3 (three) times daily.    [DISCONTINUED] ibuprofen (ADVIL,MOTRIN) 200 MG tablet Take 200 mg by mouth every 6 (six) hours as needed for Pain.    [DISCONTINUED] multivitamin Tab Take 1 tablet by mouth once daily.    [DISCONTINUED] ondansetron (ZOFRAN) 4 MG tablet Take 1 tablet (4 mg total) by mouth every 6 (six) hours as needed.    [DISCONTINUED] ondansetron (ZOFRAN-ODT) 4 MG TbDL Take 1 tablet (4 mg total) by mouth every 6 (six) hours as needed.    [DISCONTINUED] oxyCODONE-acetaminophen (PERCOCET) 5-325 mg per tablet Take 1 tablet by mouth every 6 (six) hours as needed for Pain.    [DISCONTINUED] sucralfate (CARAFATE) 1 gram tablet Take 1 tablet (1 g total) by mouth 4 (four) times daily.     Family History    None       Tobacco Use    Smoking status: Not on file    Smokeless tobacco: Not on file   Substance and Sexual Activity    Alcohol use: Yes    Drug use: Never    Sexual activity: Not on file     Review of Systems   Unable to perform ROS: Psychiatric disorder   Gastrointestinal:  Positive for abdominal pain.    Objective:     Vital Signs (Most Recent):  Temp: 98.5 °F (36.9 °C) (04/22/22 1543)  Pulse: 75 (04/22/22 1543)  Resp: 18 (04/22/22 1543)  BP: (!) 150/88 (04/22/22 1543)  SpO2: 96 % (04/22/22 1543)   Vital Signs (24h Range):  Temp:  [98.5 °F (36.9 °C)-100.2 °F (37.9 °C)] 98.5 °F (36.9 °C)  Pulse:  [] 75  Resp:  [18-24] 18  SpO2:  [96 %-97 %] 96 %  BP: (150-181)/() 150/88     Weight: 65 kg (143 lb 4.8 oz)  Body mass index is 24.6 kg/m².    Physical Exam  Vitals and nursing note reviewed.   Constitutional:       Appearance: He is well-developed.   HENT:      Head: Normocephalic and atraumatic.   Eyes:      Conjunctiva/sclera: Conjunctivae normal.      Pupils: Pupils are equal, round, and reactive to light.   Cardiovascular:      Rate and Rhythm: Normal rate and regular rhythm.      Heart sounds: Normal heart sounds.   Pulmonary:      Effort: Pulmonary effort is normal.      Breath sounds: Normal breath sounds.   Abdominal:      General: Bowel sounds are normal. There is no distension.      Palpations: Abdomen is soft.      Tenderness: There is no abdominal tenderness. There is no guarding.   Musculoskeletal:         General: Normal range of motion.      Cervical back: Normal range of motion and neck supple.   Skin:     General: Skin is warm.      Capillary Refill: Capillary refill takes less than 2 seconds.      Comments: Sweaty   Neurological:      Mental Status: He is alert.      Comments: Oriented to person place and time   Psychiatric:      Comments: Hallucinations and delusions         CRANIAL NERVES     CN III, IV, VI   Pupils are equal, round, and reactive to light.     Significant Labs: All pertinent labs within the past 24 hours have been reviewed.  CBC:   Recent Labs   Lab 04/22/22  1317   WBC 7.53   HGB 14.5   HCT 44.3        CMP:   Recent Labs   Lab 04/22/22  1317      K 3.7   CL 98   CO2 25      BUN 10   CREATININE 1.0   CALCIUM 9.4   PROT 8.1   ALBUMIN 4.2   BILITOT 0.7    ALKPHOS 76   AST 99*   ALT 69*   ANIONGAP 13   EGFRNONAA >60.0     Lipase:   Recent Labs   Lab 04/22/22  1317   LIPASE 177*       Significant Imaging: I have reviewed all pertinent imaging results/findings within the past 24 hours.  EKG: I have reviewed all pertinent results/findings within the past 24 hours and my personal findings are:  Normal sinus rhythm, nonspecific ST changes in inferior leads T-wave inversion now evident in inferior leads    CT Head Without Contrast    Result Date: 4/22/2022  CMS MANDATED QUALITY DATA - CT RADIATION 436 All CT scans at this facility utilize dose modulation, iterative reconstruction, and/or weight based dosing when appropriate to reduce radiation dose to as low as reasonably achievable. CLINICAL HISTORY: 36 years (1985) Male Mental status change, unknown cause AMS TECHNIQUE: CT HEAD WITHOUT IV CONTRAST. 105 images obtained. Axial CT of the brain without contrast using soft tissue and bone algorithm. . COMPARISON: Multiple prior, most recently study from March 4, 2022 FINDINGS: No acute intracranial hemorrhage, hydrocephalus, herniation or midline shift and the basal and suprasellar cisterns are patent. No acute skull fracture is identified. The ventricles and sulci are normal. There is normal gray white differentiation.  Orbital contents appear within normal limits. External auditory canals are unremarkable. The visualized paranasal sinuses and mastoid air cells are essentially clear. IMPRESSION: No acute intracranial process . Electronically signed by:  Gil Wheat MD  4/22/2022 1:53 PM CDT Workstation: [id number]    CT Abdomen Pelvis With Contrast    Result Date: 4/22/2022  CMS MANDATED QUALITY DATA - CT RADIATION  436 All CT scans at this facility utilize dose modulation, iterative reconstruction, and/or weight based dosing when appropriate to reduce radiation dose to as low as reasonably achievable. CLINICAL HISTORY: 36 years (1985) Male Pancreatitis, acute,  severe Patient has severe AMS.   Could not stay still - entire body involuntary tremors.  Some motion in scan.  Best possible. TECHNIQUE: CT ABDOMEN PELVIS WITH IV CONTRAST. 259 images obtained. Axial CT images of the abdomen and pelvis were obtained from the dome of the diaphragm to the proximal thigh. CONTRAST: 100 mL of IV Omni 350 was administered uneventfully by IV COMPARISON: Most recent CT from February 11, 2022. FINDINGS: The CT is suboptimal secondary to motion artifact throughout the abdomen and pelvis. Lower Thorax: The lung bases are clear. The heart is normal in size. CT Abdomen: Liver: Relative diffuse low-attenuation liver consistent with hepatic steatosis. Gallbladder: The gallbladder is within normal limits. Biliary Tree: No intra or extrahepatic ductal dilation. Spleen: Within normal limits. Pancreas: The pancreas is normal. Adrenal Glands: The adrenal glands appear within normal limits. Kidneys: The kidneys are normal in imaging appearance without hydronephrosis or hydroureter. Vasculature: The aorta is normal in course and caliber. Lymph nodes: No abdominal lymphadenopathy is seen. Intraperitoneal structures: There is no ascites. Bowel: No finding of bowel obstruction, intra-abdominal free air or abscess. The appendix is normal (image 164). Abdominal wall: The abdominal wall and musculature are normal. Musculoskeletal: No acute osseous abnormality is identified . CT Pelvis: Bladder: The urinary bladder is within normal limits. Reproductive Organs: The prostate and seminal vesicles are within normal limits. Pelvic Lymph nodes: No pelvic lymphadenopathy or pelvic mass is identified. IMPRESSION: (Technically suboptimal exam secondary to motion artifact) 1. Hepatic parenchymal findings of steatosis. 2. No finding of bowel obstruction, intra-abdominal free air or abscess. The appendix is within normal limits, and there are no findings of obstructive uropathy. . Electronically signed by:  Gil  Jarret COTTER  4/22/2022 3:31 PM CDT Workstation: [id number]    X-Ray Chest AP Portable    Result Date: 4/19/2022  Chest, single view HISTORY: Chest pain. Comparison 3/4/2022. The radiograph is lordotically positioned. The heart and mediastinum appear unremarkable. There is no acute pulmonary vascular engorgement. The lungs are clear with no infiltrate, volume loss or pleural fluid accumulation observed. IMPRESSION: No acute cardiopulmonary disease. Electronically signed by:  Leopoldo Herrera MD  4/19/2022 6:48 AM CDT Workstation: [id number]

## 2022-04-22 NOTE — HPI
Mr. Tal Echeverria is a 36-year-old male with a past medical history of hypertension, GERD, and alcohol use disorder drinking up to 15 beers a night who presents today with complaints of abdominal pain.  It is moderate.  It is associated with nausea and anorexia.  He denies fever, vomiting or diarrhea.  He is [place]ian-speaking and [employer] was used for translation.  He was noted to be having hallucinations and  tremulous in the ED.  He reports seeing chickens and reportedly trying to exercise demons from his abdomen.  History is complicated due to language barrier and  having difficulty understanding patient.  Apparently his last drink was on Sunday.  When asked how many days a week he is drinking he reports 220 per the .  He was noted to have mild transaminitis with a lipase of 177 in the ED.  per chart review he has had persistently elevated lipases in the past with the most recent visit 4 days ago.  He has also had multiple visits to the ED for hallucinations/psychosis/and suicidal ideations.  He is currently PEC'd by ER MD denying suicidal ideations at this time.  He is given 2 doses of Ativan with improvement of tremulousness.  UDS is negative.  Ethanol level is below 5. Hospital medicine consult for admission for acute alcohol withdrawal.

## 2022-04-22 NOTE — ASSESSMENT & PLAN NOTE
Admit to med tele  Be seen in the ED - 1:1 psych sitter  Scheduled Valium  P.r.n. Ativan  Monitor CIWA scale  Banana bag given in ED  Give additional IV thiamin and start daily thiamin and folic acid replacement  Seizure precautions  Ammonia within normal limits

## 2022-04-22 NOTE — H&P
[facility]  History & Physical    DOS: 04/22/2022  3:56 PM      Patient Name: Lawrence Echeverria  MRN: [id number]  Patient Class: IP- Inpatient  Admission Date: 4/22/2022  Attending Physician: Dr. Alvarez  Primary Care Provider: [facility]         Patient information was obtained from patient, past medical records and ER records.     Subjective:     Principal Problem:Alcohol withdrawal syndrome with complication    Chief Complaint:   Chief Complaint   Patient presents with    Abdominal Pain        HPI: Mr. Tal Echeverria is a 36-year-old male with a past medical history of hypertension, GERD, and alcohol use disorder drinking up to 15 beers a night who presents today with complaints of abdominal pain.  It is moderate.  It is associated with nausea and anorexia.  He denies fever, vomiting or diarrhea.  He is Nepali-speaking and Maricarmen was used for translation.  He was noted to be having hallucinations and  tremulous in the ED.  He reports seeing chickens and reportedly trying to exercise demons from his abdomen.  History is complicated due to language barrier and  having difficulty understanding patient.  Apparently his last drink was on Sunday.  When asked how many days a week he is drinking he reports 220 per the .  He was noted to have mild transaminitis with a lipase of 177 in the ED.  per chart review he has had persistently elevated lipases in the past with the most recent visit 4 days ago.  He has also had multiple visits to the ED for hallucinations/psychosis/and suicidal ideations.  He is currently PEC'd by ER MD denying suicidal ideations at this time.  He is given 2 doses of Ativan with improvement of tremulousness.  UDS is negative.  Ethanol level is below 5. Hospital medicine consult for admission for acute alcohol withdrawal.      Past Medical History:   Diagnosis Date    Alcohol abuse     GERD  (gastroesophageal reflux disease)     Hypertension        No past surgical history on file.    Review of patient's allergies indicates:  No Known Allergies    No current facility-administered medications on file prior to encounter.     Current Outpatient Medications on File Prior to Encounter   Medication Sig    [DISCONTINUED] chlordiazepoxide (LIBRIUM) 25 MG Cap Take 1 capsule (25 mg total) by mouth 4 (four) times daily. for 10 days    busPIRone (BUSPAR) 5 MG Tab Take 5 mg by mouth 2 (two) times daily.    folic acid (FOLVITE) 1 MG tablet Take 1 tablet (1 mg total) by mouth once daily.    labetaloL (NORMODYNE) 200 MG tablet Take 1 tablet (200 mg total) by mouth every 12 (twelve) hours.    pantoprazole (PROTONIX) 20 MG tablet Take 2 tablets (40 mg total) by mouth once daily.    thiamine 100 MG tablet Take 1 tablet (100 mg total) by mouth 3 (three) times daily.    [DISCONTINUED] ibuprofen (ADVIL,MOTRIN) 200 MG tablet Take 200 mg by mouth every 6 (six) hours as needed for Pain.    [DISCONTINUED] multivitamin Tab Take 1 tablet by mouth once daily.    [DISCONTINUED] ondansetron (ZOFRAN) 4 MG tablet Take 1 tablet (4 mg total) by mouth every 6 (six) hours as needed.    [DISCONTINUED] ondansetron (ZOFRAN-ODT) 4 MG TbDL Take 1 tablet (4 mg total) by mouth every 6 (six) hours as needed.    [DISCONTINUED] oxyCODONE-acetaminophen (PERCOCET) 5-325 mg per tablet Take 1 tablet by mouth every 6 (six) hours as needed for Pain.    [DISCONTINUED] sucralfate (CARAFATE) 1 gram tablet Take 1 tablet (1 g total) by mouth 4 (four) times daily.     Family History    None       Tobacco Use    Smoking status: Not on file    Smokeless tobacco: Not on file   Substance and Sexual Activity    Alcohol use: Yes    Drug use: Never    Sexual activity: Not on file     Review of Systems   Unable to perform ROS: Psychiatric disorder   Gastrointestinal:  Positive for abdominal pain.   Objective:     Vital Signs (Most Recent):  Temp: 98.5  °F (36.9 °C) (04/22/22 1543)  Pulse: 75 (04/22/22 1543)  Resp: 18 (04/22/22 1543)  BP: (!) 150/88 (04/22/22 1543)  SpO2: 96 % (04/22/22 1543)   Vital Signs (24h Range):  Temp:  [98.5 °F (36.9 °C)-100.2 °F (37.9 °C)] 98.5 °F (36.9 °C)  Pulse:  [] 75  Resp:  [18-24] 18  SpO2:  [96 %-97 %] 96 %  BP: (150-181)/() 150/88     Weight: 65 kg (143 lb 4.8 oz)  Body mass index is 24.6 kg/m².    Physical Exam  Vitals and nursing note reviewed.   Constitutional:       Appearance: He is well-developed.   HENT:      Head: Normocephalic and atraumatic.   Eyes:      Conjunctiva/sclera: Conjunctivae normal.      Pupils: Pupils are equal, round, and reactive to light.   Cardiovascular:      Rate and Rhythm: Normal rate and regular rhythm.      Heart sounds: Normal heart sounds.   Pulmonary:      Effort: Pulmonary effort is normal.      Breath sounds: Normal breath sounds.   Abdominal:      General: Bowel sounds are normal. There is no distension.      Palpations: Abdomen is soft.      Tenderness: There is no abdominal tenderness. There is no guarding.   Musculoskeletal:         General: Normal range of motion.      Cervical back: Normal range of motion and neck supple.   Skin:     General: Skin is warm.      Capillary Refill: Capillary refill takes less than 2 seconds.      Comments: Sweaty   Neurological:      Mental Status: He is alert.      Comments: Oriented to person place and time   Psychiatric:      Comments: Hallucinations and delusions         CRANIAL NERVES     CN III, IV, VI   Pupils are equal, round, and reactive to light.     Significant Labs: All pertinent labs within the past 24 hours have been reviewed.  CBC:   Recent Labs   Lab 04/22/22  1317   WBC 7.53   HGB 14.5   HCT 44.3        CMP:   Recent Labs   Lab 04/22/22  1317      K 3.7   CL 98   CO2 25      BUN 10   CREATININE 1.0   CALCIUM 9.4   PROT 8.1   ALBUMIN 4.2   BILITOT 0.7   ALKPHOS 76   AST 99*   ALT 69*   ANIONGAP 13   EGFRNONAA  >60.0     Lipase:   Recent Labs   Lab 04/22/22  1317   LIPASE 177*       Significant Imaging: I have reviewed all pertinent imaging results/findings within the past 24 hours.  EKG: I have reviewed all pertinent results/findings within the past 24 hours and my personal findings are:  Normal sinus rhythm, nonspecific ST changes in inferior leads T-wave inversion now evident in inferior leads    CT Head Without Contrast    Result Date: 4/22/2022  CMS MANDATED QUALITY DATA - CT RADIATION 436 All CT scans at this facility utilize dose modulation, iterative reconstruction, and/or weight based dosing when appropriate to reduce radiation dose to as low as reasonably achievable. CLINICAL HISTORY: 36 years (1985) Male Mental status change, unknown cause AMS TECHNIQUE: CT HEAD WITHOUT IV CONTRAST. 105 images obtained. Axial CT of the brain without contrast using soft tissue and bone algorithm. . COMPARISON: Multiple prior, most recently study from March 4, 2022 FINDINGS: No acute intracranial hemorrhage, hydrocephalus, herniation or midline shift and the basal and suprasellar cisterns are patent. No acute skull fracture is identified. The ventricles and sulci are normal. There is normal gray white differentiation.  Orbital contents appear within normal limits. External auditory canals are unremarkable. The visualized paranasal sinuses and mastoid air cells are essentially clear. IMPRESSION: No acute intracranial process . Electronically signed by:  Gil Wheat MD  4/22/2022 1:53 PM CDT Workstation: [id number]    CT Abdomen Pelvis With Contrast    Result Date: 4/22/2022  CMS MANDATED QUALITY DATA - CT RADIATION  436 All CT scans at this facility utilize dose modulation, iterative reconstruction, and/or weight based dosing when appropriate to reduce radiation dose to as low as reasonably achievable. CLINICAL HISTORY: 36 years (1985) Male Pancreatitis, acute, severe Patient has severe AMS.   Could not stay still -  entire body involuntary tremors.  Some motion in scan.  Best possible. TECHNIQUE: CT ABDOMEN PELVIS WITH IV CONTRAST. 259 images obtained. Axial CT images of the abdomen and pelvis were obtained from the dome of the diaphragm to the proximal thigh. CONTRAST: 100 mL of IV Omni 350 was administered uneventfully by IV COMPARISON: Most recent CT from February 11, 2022. FINDINGS: The CT is suboptimal secondary to motion artifact throughout the abdomen and pelvis. Lower Thorax: The lung bases are clear. The heart is normal in size. CT Abdomen: Liver: Relative diffuse low-attenuation liver consistent with hepatic steatosis. Gallbladder: The gallbladder is within normal limits. Biliary Tree: No intra or extrahepatic ductal dilation. Spleen: Within normal limits. Pancreas: The pancreas is normal. Adrenal Glands: The adrenal glands appear within normal limits. Kidneys: The kidneys are normal in imaging appearance without hydronephrosis or hydroureter. Vasculature: The aorta is normal in course and caliber. Lymph nodes: No abdominal lymphadenopathy is seen. Intraperitoneal structures: There is no ascites. Bowel: No finding of bowel obstruction, intra-abdominal free air or abscess. The appendix is normal (image 164). Abdominal wall: The abdominal wall and musculature are normal. Musculoskeletal: No acute osseous abnormality is identified . CT Pelvis: Bladder: The urinary bladder is within normal limits. Reproductive Organs: The prostate and seminal vesicles are within normal limits. Pelvic Lymph nodes: No pelvic lymphadenopathy or pelvic mass is identified. IMPRESSION: (Technically suboptimal exam secondary to motion artifact) 1. Hepatic parenchymal findings of steatosis. 2. No finding of bowel obstruction, intra-abdominal free air or abscess. The appendix is within normal limits, and there are no findings of obstructive uropathy. . Electronically signed by:  Gil Wheat MD  4/22/2022 3:31 PM CDT Workstation:  [id number]    X-Ray Chest AP Portable    Result Date: 4/19/2022  Chest, single view HISTORY: Chest pain. Comparison 3/4/2022. The radiograph is lordotically positioned. The heart and mediastinum appear unremarkable. There is no acute pulmonary vascular engorgement. The lungs are clear with no infiltrate, volume loss or pleural fluid accumulation observed. IMPRESSION: No acute cardiopulmonary disease. Electronically signed by:  Leopoldo Herrera MD  4/19/2022 6:48 AM CDT Workstation: [id number]       Assessment/Plan:     * Alcohol withdrawal syndrome with complication  Admit to med tele  Be seen in the ED - 1:1 psych sitter  Scheduled Valium  P.r.n. Ativan  Monitor CIWA scale  Banana bag given in ED  Give additional IV thiamin and start daily thiamin and folic acid replacement  Seizure precautions  Ammonia within normal limits    Elevated lipase  Probably chronic pancreatitis without any acute changes seen on imaging  Received 1 L banana bag will give another 1 L bolus  Repeat lipase level in a.m.  Triglycerides within normal limits      Hypertension  Continue home medications monitor blood pressure      VTE Risk Mitigation (From admission, onward)         Ordered     IP VTE LOW RISK PATIENT  Once         04/22/22 1750     Place sequential compression device  Until discontinued         04/22/22 1750                   Eloisa De La Rosa NP  Department of Hospital Medicine   [facility]

## 2022-04-23 LAB
ALBUMIN SERPL BCP-MCNC: 3.9 G/DL (ref 3.5–5.2)
ALP SERPL-CCNC: 69 U/L (ref 55–135)
ALT SERPL W/O P-5'-P-CCNC: 70 U/L (ref 10–44)
ANION GAP SERPL CALC-SCNC: 12 MMOL/L (ref 8–16)
AST SERPL-CCNC: 81 U/L (ref 10–40)
BASOPHILS # BLD AUTO: 0.03 K/UL (ref 0–0.2)
BASOPHILS NFR BLD: 0.5 % (ref 0–1.9)
BILIRUB SERPL-MCNC: 0.9 MG/DL (ref 0.1–1)
BUN SERPL-MCNC: 11 MG/DL (ref 6–20)
CALCIUM SERPL-MCNC: 9.3 MG/DL (ref 8.7–10.5)
CHLORIDE SERPL-SCNC: 103 MMOL/L (ref 95–110)
CO2 SERPL-SCNC: 25 MMOL/L (ref 23–29)
CREAT SERPL-MCNC: 0.7 MG/DL (ref 0.5–1.4)
DIFFERENTIAL METHOD: ABNORMAL
EOSINOPHIL # BLD AUTO: 0.1 K/UL (ref 0–0.5)
EOSINOPHIL NFR BLD: 1.5 % (ref 0–8)
ERYTHROCYTE [DISTWIDTH] IN BLOOD BY AUTOMATED COUNT: 13.7 % (ref 11.5–14.5)
EST. GFR  (AFRICAN AMERICAN): >60 ML/MIN/1.73 M^2
EST. GFR  (NON AFRICAN AMERICAN): >60 ML/MIN/1.73 M^2
GLUCOSE SERPL-MCNC: 147 MG/DL (ref 70–110)
HCT VFR BLD AUTO: 43.2 % (ref 40–54)
HGB BLD-MCNC: 14.1 G/DL (ref 14–18)
IMM GRANULOCYTES # BLD AUTO: 0.03 K/UL (ref 0–0.04)
IMM GRANULOCYTES NFR BLD AUTO: 0.5 % (ref 0–0.5)
LYMPHOCYTES # BLD AUTO: 0.6 K/UL (ref 1–4.8)
LYMPHOCYTES NFR BLD: 10.7 % (ref 18–48)
MAGNESIUM SERPL-MCNC: 2 MG/DL (ref 1.6–2.6)
MCH RBC QN AUTO: 30.9 PG (ref 27–31)
MCHC RBC AUTO-ENTMCNC: 32.6 G/DL (ref 32–36)
MCV RBC AUTO: 95 FL (ref 82–98)
MONOCYTES # BLD AUTO: 0.6 K/UL (ref 0.3–1)
MONOCYTES NFR BLD: 10.2 % (ref 4–15)
NEUTROPHILS # BLD AUTO: 4.5 K/UL (ref 1.8–7.7)
NEUTROPHILS NFR BLD: 76.6 % (ref 38–73)
NRBC BLD-RTO: 0 /100 WBC
PLATELET # BLD AUTO: 226 K/UL (ref 150–450)
PMV BLD AUTO: 9.4 FL (ref 9.2–12.9)
POTASSIUM SERPL-SCNC: 3.1 MMOL/L (ref 3.5–5.1)
PROT SERPL-MCNC: 7.5 G/DL (ref 6–8.4)
RBC # BLD AUTO: 4.56 M/UL (ref 4.6–6.2)
SODIUM SERPL-SCNC: 140 MMOL/L (ref 136–145)
WBC # BLD AUTO: 5.9 K/UL (ref 3.9–12.7)

## 2022-04-23 PROCEDURE — 20000000 HC ICU ROOM

## 2022-04-23 PROCEDURE — 83735 ASSAY OF MAGNESIUM: CPT | Performed by: NURSE PRACTITIONER

## 2022-04-23 PROCEDURE — 25000003 PHARM REV CODE 250: Performed by: NURSE PRACTITIONER

## 2022-04-23 PROCEDURE — 25000003 PHARM REV CODE 250: Performed by: FAMILY MEDICINE

## 2022-04-23 PROCEDURE — 85025 COMPLETE CBC W/AUTO DIFF WBC: CPT | Performed by: NURSE PRACTITIONER

## 2022-04-23 PROCEDURE — 36415 COLL VENOUS BLD VENIPUNCTURE: CPT | Performed by: NURSE PRACTITIONER

## 2022-04-23 PROCEDURE — 80053 COMPREHEN METABOLIC PANEL: CPT | Performed by: NURSE PRACTITIONER

## 2022-04-23 PROCEDURE — 63600175 PHARM REV CODE 636 W HCPCS: Performed by: FAMILY MEDICINE

## 2022-04-23 PROCEDURE — 63600175 PHARM REV CODE 636 W HCPCS

## 2022-04-23 PROCEDURE — 25000003 PHARM REV CODE 250: Performed by: HOSPITALIST

## 2022-04-23 PROCEDURE — 94761 N-INVAS EAR/PLS OXIMETRY MLT: CPT

## 2022-04-23 RX ORDER — DIAZEPAM 10 MG/2ML
INJECTION INTRAMUSCULAR
Status: COMPLETED
Start: 2022-04-23 | End: 2022-04-23

## 2022-04-23 RX ORDER — LORAZEPAM 2 MG/ML
4 INJECTION INTRAMUSCULAR
Status: DISCONTINUED | OUTPATIENT
Start: 2022-04-23 | End: 2022-04-23

## 2022-04-23 RX ORDER — DIAZEPAM 5 MG/1
5 TABLET ORAL ONCE
Status: DISCONTINUED | OUTPATIENT
Start: 2022-04-23 | End: 2022-04-23

## 2022-04-23 RX ORDER — DIAZEPAM 10 MG/2ML
5 INJECTION INTRAMUSCULAR EVERY 10 MIN PRN
Status: DISCONTINUED | OUTPATIENT
Start: 2022-04-23 | End: 2022-04-26 | Stop reason: HOSPADM

## 2022-04-23 RX ORDER — DIAZEPAM 10 MG/2ML
5 INJECTION INTRAMUSCULAR EVERY 6 HOURS SCHEDULED
Status: ACTIVE | OUTPATIENT
Start: 2022-04-23 | End: 2022-04-24

## 2022-04-23 RX ORDER — LORAZEPAM 2 MG/ML
2 INJECTION INTRAMUSCULAR EVERY 4 HOURS PRN
Status: DISCONTINUED | OUTPATIENT
Start: 2022-04-23 | End: 2022-04-23

## 2022-04-23 RX ORDER — LORAZEPAM 2 MG/ML
2 INJECTION INTRAMUSCULAR EVERY 6 HOURS
Status: DISCONTINUED | OUTPATIENT
Start: 2022-04-23 | End: 2022-04-23

## 2022-04-23 RX ORDER — DIAZEPAM 5 MG/1
10 TABLET ORAL EVERY 6 HOURS
Status: DISCONTINUED | OUTPATIENT
Start: 2022-04-23 | End: 2022-04-23

## 2022-04-23 RX ORDER — DEXMEDETOMIDINE HYDROCHLORIDE 4 UG/ML
0-1.4 INJECTION, SOLUTION INTRAVENOUS CONTINUOUS
Status: DISCONTINUED | OUTPATIENT
Start: 2022-04-23 | End: 2022-04-25

## 2022-04-23 RX ORDER — POTASSIUM CHLORIDE 7.45 MG/ML
40 INJECTION INTRAVENOUS ONCE
Status: COMPLETED | OUTPATIENT
Start: 2022-04-23 | End: 2022-04-23

## 2022-04-23 RX ORDER — LORAZEPAM 2 MG/ML
2 INJECTION INTRAMUSCULAR EVERY 10 MIN PRN
Status: DISCONTINUED | OUTPATIENT
Start: 2022-04-23 | End: 2022-04-23

## 2022-04-23 RX ORDER — LORAZEPAM 2 MG/ML
2 INJECTION INTRAMUSCULAR ONCE
Status: COMPLETED | OUTPATIENT
Start: 2022-04-23 | End: 2022-04-23

## 2022-04-23 RX ADMIN — LORAZEPAM 2 MG: 2 INJECTION INTRAMUSCULAR; INTRAVENOUS at 02:04

## 2022-04-23 RX ADMIN — DEXMEDETOMIDINE HYDROCHLORIDE 1.4 MCG/KG/HR: 400 INJECTION INTRAVENOUS at 07:04

## 2022-04-23 RX ADMIN — DIAZEPAM 5 MG: 5 INJECTION, SOLUTION INTRAMUSCULAR; INTRAVENOUS at 05:04

## 2022-04-23 RX ADMIN — LABETALOL HYDROCHLORIDE 200 MG: 100 TABLET, FILM COATED ORAL at 09:04

## 2022-04-23 RX ADMIN — DIAZEPAM 5 MG: 5 TABLET ORAL at 12:04

## 2022-04-23 RX ADMIN — DEXMEDETOMIDINE HYDROCHLORIDE 0.3 MCG/KG/HR: 400 INJECTION INTRAVENOUS at 04:04

## 2022-04-23 RX ADMIN — THIAMINE HCL TAB 100 MG 100 MG: 100 TAB at 09:04

## 2022-04-23 RX ADMIN — THIAMINE HCL TAB 100 MG 100 MG: 100 TAB at 02:04

## 2022-04-23 RX ADMIN — POTASSIUM CHLORIDE 40 MEQ: 20 TABLET, EXTENDED RELEASE ORAL at 02:04

## 2022-04-23 RX ADMIN — POTASSIUM CHLORIDE 40 MEQ: 7.46 INJECTION, SOLUTION INTRAVENOUS at 12:04

## 2022-04-23 RX ADMIN — DIAZEPAM 5 MG: 5 INJECTION, SOLUTION INTRAMUSCULAR; INTRAVENOUS at 11:04

## 2022-04-23 RX ADMIN — DIAZEPAM 10 MG: 5 TABLET ORAL at 02:04

## 2022-04-23 RX ADMIN — DEXMEDETOMIDINE HYDROCHLORIDE 1.4 MCG/KG/HR: 400 INJECTION INTRAVENOUS at 05:04

## 2022-04-23 NOTE — NURSING
Patient transferred to ICU floor via wheelchair, accompany  and sitter. Report was given to the ICU Nurse.

## 2022-04-23 NOTE — PROGRESS NOTES
"Pending sale to [facility] Medicine Progress Note  Patient Name: Lawrence Echeverria MRN: [id number]   Patient Class: IP- Inpatient  Length of Stay: 1   Admission Date: 4/22/2022 12:45 PM Attending Physician: Ernesto Copeland MD   Primary Care Provider: [facility] Face-to-Face encounter date: 04/23/2022   Chief Complaint: Abdominal Pain    Assessment & Plan:   Lawrence Echeverria is a 36 y.o. male admitted for alcohol withdrawal/DTs    Active Problems/Assessment & Plan  1. DTs  Intermittently agitated and combative  Precedex  Diazepam IV  CIWA  Banana bag  Requiring restraints    2. Urinary retention  Khoury catheter placed  1 L out      Core measures:  - Code status: full code   - Consultants: NA  - Diet: NPO   - DVT PPx: bilateral SCDs/  - lines: PIV      Hospital Course     04/23/2022          Discharge Planning:   Re-evaluate for needs once further along withdrawl    Subjective:    Interval History   Patient is intermittently agitated.  At time of my evaluation sedated and comp.  Sleeping.  No family present at bedside.     Review of Systems   All other Review of Systems were found to be negative expect for that mentioned already in HPI.   Objective:   Physical Exam  /79   Pulse (!) 56   Temp 96.4 °F (35.8 °C) (Axillary) Comment: Two warm blankets placed on patient.  Resp 16   Ht 5' 4" (1.626 m)   Wt 65 kg (143 lb 4.8 oz)   SpO2 96%   BMI 24.60 kg/m²   Vitals reviewed.    Constitutional: No distress.  Sleeping  HENT: Atraumatic.   Cardiovascular: Normal rate, regular rhythm and normal heart sounds.   Pulmonary/Chest: Effort normal. Clear to auscultation bilaterally. No wheezes.   Abdominal: Soft. Bowel sounds are normal. Exhibits no distension and no mass. No tenderness  Neurological:  Sedated  Skin: Skin is warm and dry.     Following labs were Reviewed   Recent Labs   Lab 04/23/22  0735   WBC 5.90   HGB 14.1   HCT 43.2      CALCIUM " 9.3   ALBUMIN 3.9   PROT 7.5      K 3.1*   CO2 25      BUN 11   CREATININE 0.7   ALKPHOS 69   ALT 70*   AST 81*   BILITOT 0.9     No results found for: POCTGLUCOSE     BMP:   Recent Labs   Lab 04/22/22  1317 04/22/22  1527 04/23/22  0735     --  147*     --  140   K 3.7  --  3.1*   CL 98  --  103   CO2 25  --  25   BUN 10  --  11   CREATININE 1.0  --  0.7   CALCIUM 9.4  --  9.3   MG  --  1.8 2.0   , CBC   Recent Labs   Lab 04/22/22  1317 04/23/22  0735   WBC 7.53 5.90   HGB 14.5 14.1   HCT 44.3 43.2    226    and All labs within the past 24 hours have been reviewed  Microbiology Results (last 7 days)     ** No results found for the last 168 hours. **        CT Abdomen Pelvis With Contrast   Final Result      CT Head Without Contrast   Final Result          Inpatient medications  Scheduled Meds:   diazePAM  5 mg Intravenous 4 times per day    folic acid  1 mg Oral Daily    labetaloL  200 mg Oral Q12H    lactated ringers  1,000 mL Intravenous Once    pantoprazole  40 mg Oral Daily    thiamine (VITAMIN B1) IVPB  300 mg Intravenous Once    thiamine  100 mg Oral TID     Continuous Infusions:   dexmedetomidine (PRECEDEX) infusion 0.3 mcg/kg/hr (04/23/22 1400)     PRN Meds:.calcium chloride IVPB, calcium chloride IVPB, calcium chloride IVPB, dextrose 50%, dextrose 50%, diazePAM, glucagon (human recombinant), glucose, glucose, magnesium oxide, magnesium sulfate IVPB, magnesium sulfate IVPB, magnesium sulfate IVPB, magnesium sulfate IVPB, naloxone, ondansetron, polyethylene glycol, potassium chloride, potassium chloride, potassium chloride, potassium chloride, sodium chloride 0.9%    Above encounter included review of the medical records, interviewing and examining the patient face-to-face, discussion with family and other health care providers, ordering and interpreting lab/test results and formulating a plan of care.     Medical Decision Making:    [] Low Complexity  [] Moderate  Complexity  [X] High Complexity    Ernesto Copeland  [facility] Hospitalist  04/23/2022

## 2022-04-23 NOTE — NURSING
0450 patient to ICU combative standing up in the bed RN's at bedside. Patient placed in restraints, precedex started.    Dr. Velez notified of aggressive behavior new orders noted.

## 2022-04-23 NOTE — PLAN OF CARE
Pt RASS -2 currently on 0.8 of Precedex. Moves all extremities. Continue to wean Precedex as tolerated. No learner available. Bed in low locked position, call light in reach, bed rails up x3, bed alarm active (zone 2). Continue to monitor and treat pain with Prn meds. Goal 4> (1-10). Sz precautions. Encourage PO intake when able. CIWA q4. Continue to monitor closely.

## 2022-04-23 NOTE — SIGNIFICANT EVENT
Pt admitted for DTs.  He has become progressively more agitated overnight and I suspect he is hallucinating.  He is cringing whenever I come near him, running around the room, and attempting to fight with the nurses.  Transferring to the ICU for precedex.    UPDATE:  Pt maxed out on precedex and is still combative.  Starting diazepam front-loading protocol.  Continue monitoring in ICU.

## 2022-04-23 NOTE — NURSING
Patient's Precedex was weaned to 0.6 and patient became alert quite quickly. While waiting for the Rolando he became verbally abusive and yelling at staff. He quickly became physically abusive, jerking at restraints and cords, aggressively shaking the bed rails, kicking bed rails and shouting. Six staff members including 2 men were required to maintain safety for the patient and staff. PRN IV Valium was given for safety. Precedex was continued at 0.6 and increased to 0.8. Rolando was obtained moments after he received IV Valium so was not helpful at that time. Continue to monitor closely. MD made aware.

## 2022-04-23 NOTE — PLAN OF CARE
[facility]  Initial Discharge Assessment       Primary Care Provider: [facility]    Admission Diagnosis: Alcohol withdrawal with delirium [F10.231]  Delirium tremens [F10.231]    Admission Date: 4/22/2022  Expected Discharge Date: 4/24/2022    Discharge Barriers Identified: Unable to afford medication, Underinsured    Payor: /     Extended Emergency Contact Information  Primary Emergency Contact: Radha Parada  Mobile Phone: [phone]  Relation: Sister  Preferred language: Yi   needed? Yes    Discharge Plan A: Home  Discharge Plan B: Home      [id number] - [place], [place] - [address]  [address] & [facility]  [address] [facility] [address]  Phone: [phone] Fax: [phone]      Initial Assessment (most recent)     Adult Discharge Assessment - 04/23/22 1245        Discharge Assessment    Assessment Type Discharge Planning Assessment     Confirmed/corrected address, phone number and insurance Yes     Confirmed Demographics Correct on Facesheet     Source of Information health record     Reason For Admission Alcohol withdrawal syndrome with complication     Lives With sibling(s)     Facility Arrived From: Home     Do you expect to return to your current living situation? Yes     Do you have help at home or someone to help you manage your care at home? Yes     Who are your caregiver(s) and their phone number(s)? Radha Parada (Sister)   [phone] (Mobile     Prior to hospitilization cognitive status: Alert/Oriented     Current cognitive status: Unable to Assess     Walking or Climbing Stairs Difficulty none     Dressing/Bathing Difficulty none     Equipment Currently Used at Home none     Do you take prescription medications? No     Do you have prescription coverage? No     Do you have any problems affording any of your prescribed medications? TBD     Is the patient taking medications as prescribed? no     Who  is going to help you get home at discharge? Radha Parada (Sister)   [phone] (Mobile     How do you get to doctors appointments? car, drives self;family or friend will provide     Are you on dialysis? No     Do you take coumadin? No     Discharge Plan A Home     Discharge Plan B Home     DME Needed Upon Discharge  none     Discharge Barriers Identified Unable to afford medication;Underinsured

## 2022-04-24 LAB
ALBUMIN SERPL BCP-MCNC: 3.8 G/DL (ref 3.5–5.2)
ALP SERPL-CCNC: 70 U/L (ref 55–135)
ALT SERPL W/O P-5'-P-CCNC: 70 U/L (ref 10–44)
ANION GAP SERPL CALC-SCNC: 7 MMOL/L (ref 8–16)
AST SERPL-CCNC: 76 U/L (ref 10–40)
BASOPHILS # BLD AUTO: 0.03 K/UL (ref 0–0.2)
BASOPHILS NFR BLD: 0.6 % (ref 0–1.9)
BILIRUB SERPL-MCNC: 0.7 MG/DL (ref 0.1–1)
BUN SERPL-MCNC: 12 MG/DL (ref 6–20)
CALCIUM SERPL-MCNC: 9.5 MG/DL (ref 8.7–10.5)
CHLORIDE SERPL-SCNC: 102 MMOL/L (ref 95–110)
CO2 SERPL-SCNC: 31 MMOL/L (ref 23–29)
CREAT SERPL-MCNC: 0.8 MG/DL (ref 0.5–1.4)
DIFFERENTIAL METHOD: ABNORMAL
EOSINOPHIL # BLD AUTO: 0.1 K/UL (ref 0–0.5)
EOSINOPHIL NFR BLD: 2.6 % (ref 0–8)
ERYTHROCYTE [DISTWIDTH] IN BLOOD BY AUTOMATED COUNT: 14.2 % (ref 11.5–14.5)
EST. GFR  (AFRICAN AMERICAN): >60 ML/MIN/1.73 M^2
EST. GFR  (NON AFRICAN AMERICAN): >60 ML/MIN/1.73 M^2
GLUCOSE SERPL-MCNC: 105 MG/DL (ref 70–110)
HCT VFR BLD AUTO: 46.6 % (ref 40–54)
HGB BLD-MCNC: 14.6 G/DL (ref 14–18)
IMM GRANULOCYTES # BLD AUTO: 0.01 K/UL (ref 0–0.04)
IMM GRANULOCYTES NFR BLD AUTO: 0.2 % (ref 0–0.5)
LYMPHOCYTES # BLD AUTO: 0.8 K/UL (ref 1–4.8)
LYMPHOCYTES NFR BLD: 14.8 % (ref 18–48)
MAGNESIUM SERPL-MCNC: 2.2 MG/DL (ref 1.6–2.6)
MCH RBC QN AUTO: 30.6 PG (ref 27–31)
MCHC RBC AUTO-ENTMCNC: 31.3 G/DL (ref 32–36)
MCV RBC AUTO: 98 FL (ref 82–98)
MONOCYTES # BLD AUTO: 0.6 K/UL (ref 0.3–1)
MONOCYTES NFR BLD: 11.8 % (ref 4–15)
NEUTROPHILS # BLD AUTO: 3.6 K/UL (ref 1.8–7.7)
NEUTROPHILS NFR BLD: 70 % (ref 38–73)
NRBC BLD-RTO: 0 /100 WBC
PLATELET # BLD AUTO: 239 K/UL (ref 150–450)
PMV BLD AUTO: 9.5 FL (ref 9.2–12.9)
POTASSIUM SERPL-SCNC: 3.7 MMOL/L (ref 3.5–5.1)
POTASSIUM SERPL-SCNC: 3.9 MMOL/L (ref 3.5–5.1)
PROT SERPL-MCNC: 7.3 G/DL (ref 6–8.4)
RBC # BLD AUTO: 4.77 M/UL (ref 4.6–6.2)
SODIUM SERPL-SCNC: 140 MMOL/L (ref 136–145)
WBC # BLD AUTO: 5.07 K/UL (ref 3.9–12.7)

## 2022-04-24 PROCEDURE — 99900031 HC PATIENT EDUCATION (STAT)

## 2022-04-24 PROCEDURE — 85025 COMPLETE CBC W/AUTO DIFF WBC: CPT | Performed by: NURSE PRACTITIONER

## 2022-04-24 PROCEDURE — 94761 N-INVAS EAR/PLS OXIMETRY MLT: CPT

## 2022-04-24 PROCEDURE — 25000003 PHARM REV CODE 250: Performed by: NURSE PRACTITIONER

## 2022-04-24 PROCEDURE — 83735 ASSAY OF MAGNESIUM: CPT | Performed by: NURSE PRACTITIONER

## 2022-04-24 PROCEDURE — 25000003 PHARM REV CODE 250: Performed by: FAMILY MEDICINE

## 2022-04-24 PROCEDURE — 80053 COMPREHEN METABOLIC PANEL: CPT | Performed by: NURSE PRACTITIONER

## 2022-04-24 PROCEDURE — 84132 ASSAY OF SERUM POTASSIUM: CPT | Performed by: FAMILY MEDICINE

## 2022-04-24 PROCEDURE — 99900035 HC TECH TIME PER 15 MIN (STAT)

## 2022-04-24 PROCEDURE — 36415 COLL VENOUS BLD VENIPUNCTURE: CPT | Performed by: FAMILY MEDICINE

## 2022-04-24 PROCEDURE — 20000000 HC ICU ROOM

## 2022-04-24 RX ORDER — LORAZEPAM 2 MG/ML
2 INJECTION INTRAMUSCULAR EVERY 4 HOURS PRN
Status: DISCONTINUED | OUTPATIENT
Start: 2022-04-24 | End: 2022-04-26 | Stop reason: HOSPADM

## 2022-04-24 RX ADMIN — FOLIC ACID 1 MG: 1 TABLET ORAL at 08:04

## 2022-04-24 RX ADMIN — LABETALOL HYDROCHLORIDE 200 MG: 100 TABLET, FILM COATED ORAL at 08:04

## 2022-04-24 RX ADMIN — POTASSIUM CHLORIDE 20 MEQ: 1500 TABLET, EXTENDED RELEASE ORAL at 05:04

## 2022-04-24 RX ADMIN — THIAMINE HCL TAB 100 MG 100 MG: 100 TAB at 08:04

## 2022-04-24 RX ADMIN — DEXMEDETOMIDINE HYDROCHLORIDE 0.3 MCG/KG/HR: 400 INJECTION INTRAVENOUS at 12:04

## 2022-04-24 RX ADMIN — PANTOPRAZOLE SODIUM 40 MG: 40 TABLET, DELAYED RELEASE ORAL at 05:04

## 2022-04-24 RX ADMIN — THIAMINE HCL TAB 100 MG 100 MG: 100 TAB at 04:04

## 2022-04-24 NOTE — PROGRESS NOTES
"[facility] Medicine Progress Note  Patient Name: Lawrence Echeverria MRN: [id number]   Patient Class: IP- Inpatient  Length of Stay: 2   Admission Date: 4/22/2022 12:45 PM Attending Physician: Ernesto Copeland MD   Primary Care Provider: [facility] Face-to-Face encounter date: 04/24/2022   Chief Complaint: Abdominal Pain    Assessment & Plan:   Lawrence Echeverria is a 36 y.o. male admitted for alcohol withdrawal/DTs    Active Problems/Assessment & Plan  1. DTs  Doing much better today  Calm and conversant on my evaluation  Weaning Precedex  Ativan p.r.n.  CIWA  Banana bag  Restraints discontinued    2. Urinary retention  Khoury catheter placed  1 L out    If he does well today, a anticipate being able to transfer him to a floor bed tomorrow.    Core measures:  - Code status: full code   - Consultants: NA  - Diet: NPO   - DVT PPx: bilateral SCDs/  - lines: PIV      Hospital Course     04/24/2022          Discharge Planning:   Re-evaluate for needs once further along withdrawl    Subjective:    Interval History   Doing better.  Weaning off Precedex.  No complaints.  .  No family present at bedside.     Review of Systems   All other Review of Systems were found to be negative expect for that mentioned already in HPI.   Objective:   Physical Exam  /65   Pulse 77   Temp 98.4 °F (36.9 °C) (Oral)   Resp (!) 26   Ht 5' 4" (1.626 m)   Wt 62.9 kg (138 lb 10.7 oz)   SpO2 98%   BMI 23.80 kg/m²   Vitals reviewed.    Constitutional: No distress.  Awake  HENT: Atraumatic.   Cardiovascular: Normal rate, regular rhythm and normal heart sounds.   Pulmonary/Chest: Effort normal. Clear to auscultation bilaterally. No wheezes.   Abdominal: Soft. Bowel sounds are normal. Exhibits no distension and no mass. No tenderness  Neurological:  Alert.  No tremor noted.  Not tachycardic.  Skin: Skin is warm and dry.     Following labs were Reviewed   Recent " Labs   Lab 04/24/22  0324   WBC 5.07   HGB 14.6   HCT 46.6      CALCIUM 9.5   ALBUMIN 3.8   PROT 7.3      K 3.9  3.7   CO2 31*      BUN 12   CREATININE 0.8   ALKPHOS 70   ALT 70*   AST 76*   BILITOT 0.7     No results found for: POCTGLUCOSE     BMP:   Recent Labs   Lab 04/23/22  0735 04/24/22  0324   * 105    140   K 3.1* 3.9  3.7    102   CO2 25 31*   BUN 11 12   CREATININE 0.7 0.8   CALCIUM 9.3 9.5   MG 2.0 2.2   , CBC   Recent Labs   Lab 04/23/22  0735 04/24/22  0324   WBC 5.90 5.07   HGB 14.1 14.6   HCT 43.2 46.6    239    and All labs within the past 24 hours have been reviewed  Microbiology Results (last 7 days)     ** No results found for the last 168 hours. **        CT Abdomen Pelvis With Contrast   Final Result      CT Head Without Contrast   Final Result          Inpatient medications  Scheduled Meds:   folic acid  1 mg Oral Daily    labetaloL  200 mg Oral Q12H    lactated ringers  1,000 mL Intravenous Once    pantoprazole  40 mg Oral Daily    thiamine (VITAMIN B1) IVPB  300 mg Intravenous Once    thiamine  100 mg Oral TID     Continuous Infusions:   dexmedetomidine (PRECEDEX) infusion 0.3 mcg/kg/hr (04/24/22 1203)     PRN Meds:.calcium chloride IVPB, calcium chloride IVPB, calcium chloride IVPB, dextrose 50%, dextrose 50%, diazePAM, glucagon (human recombinant), glucose, glucose, lorazepam, magnesium oxide, magnesium sulfate IVPB, magnesium sulfate IVPB, magnesium sulfate IVPB, magnesium sulfate IVPB, naloxone, ondansetron, polyethylene glycol, potassium chloride, potassium chloride, potassium chloride, potassium chloride, sodium chloride 0.9%    Above encounter included review of the medical records, interviewing and examining the patient face-to-face, discussion with family and other health care providers, ordering and interpreting lab/test results and formulating a plan of care.     Medical Decision Making:    [] Low Complexity  [x] Moderate  Complexity  [] High Complexity    Ernesto Copeland  [facility] Hospitalist  04/24/2022

## 2022-04-24 NOTE — NURSING
Restraints removed from patient, is resting quietly with no outbursts, when roused is calm and cooperative.

## 2022-04-25 LAB
ALBUMIN SERPL BCP-MCNC: 3.5 G/DL (ref 3.5–5.2)
ALP SERPL-CCNC: 65 U/L (ref 55–135)
ALT SERPL W/O P-5'-P-CCNC: 71 U/L (ref 10–44)
ANION GAP SERPL CALC-SCNC: 9 MMOL/L (ref 8–16)
AST SERPL-CCNC: 55 U/L (ref 10–40)
BASOPHILS # BLD AUTO: 0.02 K/UL (ref 0–0.2)
BASOPHILS NFR BLD: 0.4 % (ref 0–1.9)
BILIRUB SERPL-MCNC: 0.4 MG/DL (ref 0.1–1)
BUN SERPL-MCNC: 11 MG/DL (ref 6–20)
CALCIUM SERPL-MCNC: 9.1 MG/DL (ref 8.7–10.5)
CHLORIDE SERPL-SCNC: 101 MMOL/L (ref 95–110)
CO2 SERPL-SCNC: 28 MMOL/L (ref 23–29)
CREAT SERPL-MCNC: 0.9 MG/DL (ref 0.5–1.4)
DIFFERENTIAL METHOD: ABNORMAL
EOSINOPHIL # BLD AUTO: 0.1 K/UL (ref 0–0.5)
EOSINOPHIL NFR BLD: 2.5 % (ref 0–8)
ERYTHROCYTE [DISTWIDTH] IN BLOOD BY AUTOMATED COUNT: 13.9 % (ref 11.5–14.5)
EST. GFR  (AFRICAN AMERICAN): >60 ML/MIN/1.73 M^2
EST. GFR  (NON AFRICAN AMERICAN): >60 ML/MIN/1.73 M^2
GLUCOSE SERPL-MCNC: 106 MG/DL (ref 70–110)
HCT VFR BLD AUTO: 41.9 % (ref 40–54)
HGB BLD-MCNC: 13.7 G/DL (ref 14–18)
IMM GRANULOCYTES # BLD AUTO: 0.02 K/UL (ref 0–0.04)
IMM GRANULOCYTES NFR BLD AUTO: 0.4 % (ref 0–0.5)
LYMPHOCYTES # BLD AUTO: 0.9 K/UL (ref 1–4.8)
LYMPHOCYTES NFR BLD: 18 % (ref 18–48)
MAGNESIUM SERPL-MCNC: 2 MG/DL (ref 1.6–2.6)
MCH RBC QN AUTO: 31 PG (ref 27–31)
MCHC RBC AUTO-ENTMCNC: 32.7 G/DL (ref 32–36)
MCV RBC AUTO: 95 FL (ref 82–98)
MONOCYTES # BLD AUTO: 0.6 K/UL (ref 0.3–1)
MONOCYTES NFR BLD: 11.7 % (ref 4–15)
NEUTROPHILS # BLD AUTO: 3.5 K/UL (ref 1.8–7.7)
NEUTROPHILS NFR BLD: 67 % (ref 38–73)
NRBC BLD-RTO: 0 /100 WBC
PLATELET # BLD AUTO: 247 K/UL (ref 150–450)
PMV BLD AUTO: 8.8 FL (ref 9.2–12.9)
POTASSIUM SERPL-SCNC: 4 MMOL/L (ref 3.5–5.1)
PROT SERPL-MCNC: 6.8 G/DL (ref 6–8.4)
RBC # BLD AUTO: 4.42 M/UL (ref 4.6–6.2)
SODIUM SERPL-SCNC: 138 MMOL/L (ref 136–145)
WBC # BLD AUTO: 5.21 K/UL (ref 3.9–12.7)

## 2022-04-25 PROCEDURE — 80053 COMPREHEN METABOLIC PANEL: CPT | Performed by: NURSE PRACTITIONER

## 2022-04-25 PROCEDURE — 94761 N-INVAS EAR/PLS OXIMETRY MLT: CPT

## 2022-04-25 PROCEDURE — 85025 COMPLETE CBC W/AUTO DIFF WBC: CPT | Performed by: NURSE PRACTITIONER

## 2022-04-25 PROCEDURE — 12000002 HC ACUTE/MED SURGE SEMI-PRIVATE ROOM

## 2022-04-25 PROCEDURE — 25000003 PHARM REV CODE 250: Performed by: NURSE PRACTITIONER

## 2022-04-25 PROCEDURE — 36415 COLL VENOUS BLD VENIPUNCTURE: CPT | Performed by: NURSE PRACTITIONER

## 2022-04-25 PROCEDURE — 63600175 PHARM REV CODE 636 W HCPCS: Performed by: FAMILY MEDICINE

## 2022-04-25 PROCEDURE — 25000003 PHARM REV CODE 250: Performed by: FAMILY MEDICINE

## 2022-04-25 PROCEDURE — 83735 ASSAY OF MAGNESIUM: CPT | Performed by: NURSE PRACTITIONER

## 2022-04-25 RX ORDER — ESCITALOPRAM OXALATE 5 MG/1
5 TABLET ORAL DAILY
Status: DISCONTINUED | OUTPATIENT
Start: 2022-04-26 | End: 2022-04-26 | Stop reason: HOSPADM

## 2022-04-25 RX ORDER — CHLORHEXIDINE GLUCONATE ORAL RINSE 1.2 MG/ML
15 SOLUTION DENTAL 2 TIMES DAILY
Status: DISCONTINUED | OUTPATIENT
Start: 2022-04-25 | End: 2022-04-26 | Stop reason: HOSPADM

## 2022-04-25 RX ORDER — MUPIROCIN 20 MG/G
OINTMENT TOPICAL 2 TIMES DAILY
Status: DISCONTINUED | OUTPATIENT
Start: 2022-04-25 | End: 2022-04-26 | Stop reason: HOSPADM

## 2022-04-25 RX ADMIN — CHLORHEXIDINE GLUCONATE 15 ML: 1.2 RINSE ORAL at 09:04

## 2022-04-25 RX ADMIN — THIAMINE HCL TAB 100 MG 100 MG: 100 TAB at 03:04

## 2022-04-25 RX ADMIN — FOLIC ACID 1 MG: 1 TABLET ORAL at 07:04

## 2022-04-25 RX ADMIN — THIAMINE HCL TAB 100 MG 100 MG: 100 TAB at 09:04

## 2022-04-25 RX ADMIN — CHLORHEXIDINE GLUCONATE 15 ML: 1.2 RINSE ORAL at 10:04

## 2022-04-25 RX ADMIN — LORAZEPAM 2 MG: 2 INJECTION INTRAMUSCULAR; INTRAVENOUS at 08:04

## 2022-04-25 RX ADMIN — PANTOPRAZOLE SODIUM 40 MG: 40 TABLET, DELAYED RELEASE ORAL at 06:04

## 2022-04-25 RX ADMIN — LABETALOL HYDROCHLORIDE 200 MG: 100 TABLET, FILM COATED ORAL at 07:04

## 2022-04-25 RX ADMIN — MUPIROCIN: 20 OINTMENT TOPICAL at 09:04

## 2022-04-25 RX ADMIN — MUPIROCIN: 20 OINTMENT TOPICAL at 10:04

## 2022-04-25 RX ADMIN — THIAMINE HCL TAB 100 MG 100 MG: 100 TAB at 07:04

## 2022-04-25 RX ADMIN — LABETALOL HYDROCHLORIDE 200 MG: 100 TABLET, FILM COATED ORAL at 09:04

## 2022-04-25 NOTE — HPI
"4/25/2022  Identifying information  36-year-old  male was admitted to [facility] on 04/22/2022 on a physician's emergency certificate.  With the chief complaint that patient felt he had demons in him.  Patient lives at [address] [place] [address] along with 2 of his other friends.  He works as a .  Patient does not speak English and the examination was conducted with the help of patient's friend by name of Ivonne.  Part of the examination was conducted through the telephone alis.     History of presenting problem  Patient had been drinking alcohol heavily for last 4 weeks.  During this phase at some point patient started to hear voices which were mostly male calling him  By different derogatory terms.  At 1 point the voices suggested that he kills himself.  Patient called the police and told that he had demons in him and patient was brought to the emergency room of [facility] where PEC was reassured and patient admitted.   Patient reported that he had not slept in days prior to coming to the hospital, his appetite was poor, he reports having crying spells, and had suicidal thoughts along with auditory hallucinations.  Here in the hospital patient is reported to have been getting agitated, combative, trying to get into fight, and received IM medication to control the situation, patient was detoxed from alcohol withdrawals, and he last Ativan that he received was early this morning.  Patient is reported to be calm since then.     Past psychiatric history  Patient was admitted to [facility] in November of last year and since then he has been here for 5 times" almost all of the hospitalizations as well as I understood have near similar presentation.     Family history  Patient states positive however I had difficulty understanding the extent of the family history regarding the psychiatric illness.     Social history  Patient's father is diseased, he has 1 brother and 1 " sister, patient is , and has 2 children of 16-year-old daughter and a 7-year-old son.  He is from [place] and says that he does not speak English.  Patient admits to drinking alcohol regularly.  For a long period of time heavy for the last 4 weeks as per patient.    Medical surgical history  High blood pressure, alcohol withdrawal syndrome, GERD    Allergies  None reported    Current psychotropics  None    Mental status examination  36-year-old  male, laying in bed, playing with his cell phone, made eye contact,  He spoke in [place] citizen, his thought processes seemed to be detailed, as the  took considerable time in getting the answer to the question asked, he denied having any active auditory or visual hallucinations.  He denied active homicidal or suicidal intent.  He is alert and cooperative.  He is oriented to day date month year.  His mood is even.  Affect appropriate to the situation.  His insight and judgment seems fair.    Impression  1. Major depression recurrent with psychosis       Psychosis seems to have resolved.  2. Alcohol dependency  3. Alcohol withdrawal syndrome        Recommendations  1. Lexapro 10 mg 1 p.o. q.d.  2. Abilify 5 mg 1 p.o. q.h.s.  3. Outpatient follow-up with psychiatrist of choice  4. AA, substance abuse Clinic,   5. Disregard [facility] status if attending is planning to discharge the patient.   thank you

## 2022-04-25 NOTE — CONSULTS
[facility]  Psychiatry  Consult Note    Patient Name: Lawrence Echeverria  MRN: [id number]   Code Status: Full Code  Admission Date: 4/22/2022  Hospital Length of Stay: 3 days  Attending Physician: Ernesto Copeland MD  Primary Care Provider: [facility]    Current Legal Status: 's Emergency Certificate (CEC)    Patient information was obtained from relative(s) and ER records.   Consults  Subjective:     Principal Problem:Alcohol withdrawal syndrome with complication    Chief Complaint:  SI     HPI: 4/25/2022  Identifying information  36-year-old  male was admitted to [facility] on 04/22/2022 on a physician's emergency certificate.  With the chief complaint that patient felt he had demons in him.  Patient lives at [address] [place] [address] along with 2 of his other friends.  He works as a .  Patient does not speak English and the examination was conducted with the help of patient's friend by name of Ivonne.  Part of the examination was conducted through the telephone alis.     History of presenting problem  Patient had been drinking alcohol heavily for last 4 weeks.  During this phase at some point patient started to hear voices which were mostly male calling him  By different derogatory terms.  At 1 point the voices suggested that he kills himself.  Patient called the police and told that he had demons in him and patient was brought to the emergency room of [facility] where PEC was reassured and patient admitted.   Patient reported that he had not slept in days prior to coming to the hospital, his appetite was poor, he reports having crying spells, and had suicidal thoughts along with auditory hallucinations.  Here in the hospital patient is reported to have been getting agitated, combative, trying to get into fight, and received IM medication to control the situation, patient was detoxed from alcohol withdrawals,  "and he last Ativan that he received was early this morning.  Patient is reported to be calm since then.     Past psychiatric history  Patient was admitted to [facility] in November of last year and since then he has been here for 5 times" almost all of the hospitalizations as well as I understood have near similar presentation.     Family history  Patient states positive however I had difficulty understanding the extent of the family history regarding the psychiatric illness.     Social history  Patient's father is diseased, he has 1 brother and 1 sister, patient is , and has 2 children of 16-year-old daughter and a 7-year-old son.  He is from [place] and says that he does not speak English.  Patient admits to drinking alcohol regularly.  For a long period of time heavy for the last 4 weeks as per patient.    Medical surgical history  High blood pressure, alcohol withdrawal syndrome, GERD    Allergies  None reported    Current psychotropics  None    Mental status examination  36-year-old  male, laying in bed, playing with his cell phone, made eye contact,  He spoke in Kisw[place]ili, his thought processes seemed to be detailed, as the  took considerable time in getting the answer to the question asked, he denied having any active auditory or visual hallucinations.  He denied active homicidal or suicidal intent.  He is alert and cooperative.  He is oriented to day date month year.  His mood is even.  Affect appropriate to the situation.  His insight and judgment seems fair.    Impression  1. Major depression recurrent with psychosis       Psychosis seems to have resolved.  2. Alcohol dependency  3. Alcohol withdrawal syndrome        Recommendations  1. Lexapro 10 mg 1 p.o. q.d.  2. Abilify 5 mg 1 p.o. q.h.s.  3. Outpatient follow-up with psychiatrist of choice  4. AA, substance abuse Clinic,   5. Disregard [facility] status if attending is planning to discharge the patient.   thank " you                Hospital Course: No notes on file    No new subjective & objective note has been filed under this hospital service since the last note was generated.    Assessment/Plan:     No notes have been filed under this hospital service.  Service: Psychiatry       Total Time:  60 minutes      Sandra Martinez MD   Psychiatry  [facility]

## 2022-04-25 NOTE — CARE UPDATE
04/24/22 2223   PRE-TX-O2   O2 Device (Oxygen Therapy) room air   SpO2 98 %   Pulse Oximetry Type Continuous   $ Pulse Oximetry - Multiple Charge Pulse Oximetry - Multiple   Pulse 72   Resp (!) 43   Education   $ Education 15 min   Respiratory Evaluation   $ Care Plan Tech Time 15 min

## 2022-04-25 NOTE — NURSING
's office called, notified of change in pt status. Pt is now calm, cooperative and oriented.  stated they will come reevaluate pt and CEC order. Will continue to monitor pt.  at bedside.

## 2022-04-25 NOTE — PROGRESS NOTES
"Formerly [facility], [facility]–1983 Medicine Progress Note  Patient Name: Lawrence Echeverria MRN: [id number]   Patient Class: IP- Inpatient  Length of Stay: 3   Admission Date: 4/22/2022 12:45 PM Attending Physician: Ernesto Copeland MD   Primary Care Provider: [facility] Face-to-Face encounter date: 04/25/2022   Chief Complaint: Abdominal Pain    Assessment & Plan:   Lawrence Echeverria is a 36 y.o. male admitted for alcohol withdrawal/DTs    Active Problems/Assessment & Plan  1. DTs  Weaned off precedex  Decreased prn ativan requirement  Transfer to med surg  CIWA  Banana bag    Psychiatry consult:  They recommending Lexapro 5 milligrams and Abilify 10 milligrams q.h.s.    I started the Abilify in the hospital as it is on the formulary.  Lexapro so not on the formulary and so will need to initiate at time of discharge.  He should also follow-up with psychiatric care of his choice.    2. Urinary retention  D/C arredondo    Safe for transfer to floor bed.  OK to D/C 1:1  Anticipate discharge tomorrow    Core measures:  - Code status: full code   - Consultants: NA  - Diet: NPO   - DVT PPx: bilateral SCDs/  - lines: PIV      Hospital Course     04/25/2022          Discharge Planning:   Re-evaluate for needs once further along withdrawl    Subjective:    Interval History   Doing better.  No complaints.  No pain.  No fever. .  No family present at bedside.     Review of Systems   All other Review of Systems were found to be negative expect for that mentioned already in HPI.   Objective:   Physical Exam  /78 (BP Location: Left arm, Patient Position: Lying)   Pulse 65   Temp 98.3 °F (36.8 °C) (Oral)   Resp (!) 24   Ht 5' 4" (1.626 m)   Wt 62.4 kg (137 lb 9.1 oz)   SpO2 99%   BMI 23.61 kg/m²   Vitals reviewed.    Constitutional: No distress.  Awake  HENT: Atraumatic.   Cardiovascular: Normal rate, regular rhythm and normal heart sounds.   Pulmonary/Chest: Effort " normal. Clear to auscultation bilaterally. No wheezes.   Abdominal: Soft. Bowel sounds are normal. Exhibits no distension and no mass. No tenderness  Neurological:  Alert.  No tremor noted.  Not tachycardic.  Skin: Skin is warm and dry.     Following labs were Reviewed   Recent Labs   Lab 04/25/22 0521   WBC 5.21   HGB 13.7*   HCT 41.9      CALCIUM 9.1   ALBUMIN 3.5   PROT 6.8      K 4.0   CO2 28      BUN 11   CREATININE 0.9   ALKPHOS 65   ALT 71*   AST 55*   BILITOT 0.4     No results found for: POCTGLUCOSE     BMP:   Recent Labs   Lab 04/24/22 0324 04/25/22 0521    106    138   K 3.9  3.7 4.0    101   CO2 31* 28   BUN 12 11   CREATININE 0.8 0.9   CALCIUM 9.5 9.1   MG 2.2 2.0   , CBC   Recent Labs   Lab 04/24/22 0324 04/25/22 0521   WBC 5.07 5.21   HGB 14.6 13.7*   HCT 46.6 41.9    247    and All labs within the past 24 hours have been reviewed  Microbiology Results (last 7 days)     ** No results found for the last 168 hours. **        CT Abdomen Pelvis With Contrast   Final Result      CT Head Without Contrast   Final Result          Inpatient medications  Scheduled Meds:   chlorhexidine  15 mL Mouth/Throat BID    folic acid  1 mg Oral Daily    labetaloL  200 mg Oral Q12H    lactated ringers  1,000 mL Intravenous Once    mupirocin   Nasal BID    pantoprazole  40 mg Oral Daily    thiamine (VITAMIN B1) IVPB  300 mg Intravenous Once    thiamine  100 mg Oral TID     Continuous Infusions:    PRN Meds:.calcium chloride IVPB, calcium chloride IVPB, calcium chloride IVPB, dextrose 50%, dextrose 50%, diazePAM, glucagon (human recombinant), glucose, glucose, lorazepam, magnesium oxide, magnesium sulfate IVPB, magnesium sulfate IVPB, magnesium sulfate IVPB, magnesium sulfate IVPB, naloxone, ondansetron, polyethylene glycol, potassium chloride, potassium chloride, potassium chloride, potassium chloride, sodium chloride 0.9%    Above encounter included review of the  medical records, interviewing and examining the patient face-to-face, discussion with family and other health care providers, ordering and interpreting lab/test results and formulating a plan of care.     Medical Decision Making:    [] Low Complexity  [x] Moderate Complexity  [] High Complexity    Ernesto Copeland  [facility] Hospitalist  04/25/2022

## 2022-04-25 NOTE — NURSING TRANSFER
Nursing Transfer Note      4/25/2022     Reason patient is being transferred: NA    Transfer To: 1110    Transfer via wheelchair    Transfer with Maricarmen    Transported by EMMANUEL Jauregui RN and sitter    Medicines sent: yes    Any special needs or follow-up needed: pt to be reevaluated by     Chart send with patient: Yes    Notified: sibling    Patient reassessed at: 4/25/2022 1030 (date, time)    Upon arrival to floor: patient oriented to room, call bell in reach and bed in lowest position, pt belongings at bedside. Maricarmen in room, pt updated on plan of care. Updated bedside report given to RN.

## 2022-04-25 NOTE — NURSING
I have reviewed the report and assessment of this patient and agree with the findings.Patient AAO,vitals stable ,bed in lowest positiion ,call bell in reach.Instructed to call for assistance if needed.All safety measures are met .Sitter is at bedside.

## 2022-04-26 VITALS
HEIGHT: 64 IN | TEMPERATURE: 98 F | DIASTOLIC BLOOD PRESSURE: 88 MMHG | RESPIRATION RATE: 18 BRPM | OXYGEN SATURATION: 96 % | SYSTOLIC BLOOD PRESSURE: 142 MMHG | WEIGHT: 137.56 LBS | BODY MASS INDEX: 23.49 KG/M2 | HEART RATE: 62 BPM

## 2022-04-26 LAB
ALBUMIN SERPL BCP-MCNC: 3.8 G/DL (ref 3.5–5.2)
ALP SERPL-CCNC: 64 U/L (ref 55–135)
ALT SERPL W/O P-5'-P-CCNC: 80 U/L (ref 10–44)
ANION GAP SERPL CALC-SCNC: 5 MMOL/L (ref 8–16)
AST SERPL-CCNC: 62 U/L (ref 10–40)
BASOPHILS # BLD AUTO: 0.02 K/UL (ref 0–0.2)
BASOPHILS NFR BLD: 0.4 % (ref 0–1.9)
BILIRUB SERPL-MCNC: 0.5 MG/DL (ref 0.1–1)
BUN SERPL-MCNC: 15 MG/DL (ref 6–20)
CALCIUM SERPL-MCNC: 9 MG/DL (ref 8.7–10.5)
CHLORIDE SERPL-SCNC: 101 MMOL/L (ref 95–110)
CO2 SERPL-SCNC: 30 MMOL/L (ref 23–29)
CREAT SERPL-MCNC: 0.8 MG/DL (ref 0.5–1.4)
DIFFERENTIAL METHOD: ABNORMAL
EOSINOPHIL # BLD AUTO: 0.2 K/UL (ref 0–0.5)
EOSINOPHIL NFR BLD: 3.2 % (ref 0–8)
ERYTHROCYTE [DISTWIDTH] IN BLOOD BY AUTOMATED COUNT: 13.9 % (ref 11.5–14.5)
EST. GFR  (AFRICAN AMERICAN): >60 ML/MIN/1.73 M^2
EST. GFR  (NON AFRICAN AMERICAN): >60 ML/MIN/1.73 M^2
GLUCOSE SERPL-MCNC: 93 MG/DL (ref 70–110)
HCT VFR BLD AUTO: 45.1 % (ref 40–54)
HGB BLD-MCNC: 14.7 G/DL (ref 14–18)
IMM GRANULOCYTES # BLD AUTO: 0.03 K/UL (ref 0–0.04)
IMM GRANULOCYTES NFR BLD AUTO: 0.6 % (ref 0–0.5)
LYMPHOCYTES # BLD AUTO: 1.4 K/UL (ref 1–4.8)
LYMPHOCYTES NFR BLD: 25.1 % (ref 18–48)
MAGNESIUM SERPL-MCNC: 2.1 MG/DL (ref 1.6–2.6)
MCH RBC QN AUTO: 30.8 PG (ref 27–31)
MCHC RBC AUTO-ENTMCNC: 32.6 G/DL (ref 32–36)
MCV RBC AUTO: 94 FL (ref 82–98)
MONOCYTES # BLD AUTO: 0.7 K/UL (ref 0.3–1)
MONOCYTES NFR BLD: 13.4 % (ref 4–15)
NEUTROPHILS # BLD AUTO: 3.1 K/UL (ref 1.8–7.7)
NEUTROPHILS NFR BLD: 57.3 % (ref 38–73)
NRBC BLD-RTO: 0 /100 WBC
PLATELET # BLD AUTO: 286 K/UL (ref 150–450)
PMV BLD AUTO: 9.2 FL (ref 9.2–12.9)
POTASSIUM SERPL-SCNC: 3.6 MMOL/L (ref 3.5–5.1)
PROT SERPL-MCNC: 7.6 G/DL (ref 6–8.4)
RBC # BLD AUTO: 4.78 M/UL (ref 4.6–6.2)
SODIUM SERPL-SCNC: 136 MMOL/L (ref 136–145)
WBC # BLD AUTO: 5.38 K/UL (ref 3.9–12.7)

## 2022-04-26 PROCEDURE — 25000003 PHARM REV CODE 250: Performed by: FAMILY MEDICINE

## 2022-04-26 PROCEDURE — 83735 ASSAY OF MAGNESIUM: CPT | Performed by: NURSE PRACTITIONER

## 2022-04-26 PROCEDURE — 36415 COLL VENOUS BLD VENIPUNCTURE: CPT | Performed by: NURSE PRACTITIONER

## 2022-04-26 PROCEDURE — 25000003 PHARM REV CODE 250: Performed by: NURSE PRACTITIONER

## 2022-04-26 PROCEDURE — 85025 COMPLETE CBC W/AUTO DIFF WBC: CPT | Performed by: NURSE PRACTITIONER

## 2022-04-26 PROCEDURE — 80053 COMPREHEN METABOLIC PANEL: CPT | Performed by: NURSE PRACTITIONER

## 2022-04-26 RX ORDER — ESCITALOPRAM OXALATE 5 MG/1
5 TABLET ORAL DAILY
Qty: 30 TABLET | Refills: 0 | Status: ON HOLD | OUTPATIENT
Start: 2022-04-27 | End: 2023-10-12 | Stop reason: HOSPADM

## 2022-04-26 RX ORDER — ARIPIPRAZOLE 5 MG/1
5 TABLET ORAL NIGHTLY
Qty: 30 TABLET | Refills: 0 | Status: ON HOLD | OUTPATIENT
Start: 2022-04-26 | End: 2023-10-12 | Stop reason: HOSPADM

## 2022-04-26 RX ADMIN — THIAMINE HCL TAB 100 MG 100 MG: 100 TAB at 08:04

## 2022-04-26 RX ADMIN — FOLIC ACID 1 MG: 1 TABLET ORAL at 08:04

## 2022-04-26 RX ADMIN — MUPIROCIN: 20 OINTMENT TOPICAL at 08:04

## 2022-04-26 RX ADMIN — PANTOPRAZOLE SODIUM 40 MG: 40 TABLET, DELAYED RELEASE ORAL at 05:04

## 2022-04-26 RX ADMIN — ESCITALOPRAM 5 MG: 5 TABLET, FILM COATED ORAL at 08:04

## 2022-04-26 RX ADMIN — CHLORHEXIDINE GLUCONATE 15 ML: 1.2 RINSE ORAL at 08:04

## 2022-04-26 RX ADMIN — LABETALOL HYDROCHLORIDE 200 MG: 100 TABLET, FILM COATED ORAL at 08:04

## 2022-04-26 NOTE — PLAN OF CARE
Problem: Adult Inpatient Plan of Care  Goal: Plan of Care Review  Outcome: Ongoing, Progressing  Goal: Patient-Specific Goal (Individualized)  Outcome: Ongoing, Progressing  Goal: Absence of Hospital-Acquired Illness or Injury  Outcome: Ongoing, Progressing  Goal: Optimal Comfort and Wellbeing  Outcome: Ongoing, Progressing  Goal: Readiness for Transition of Care  Outcome: Ongoing, Progressing     Problem: Fall Injury Risk  Goal: Absence of Fall and Fall-Related Injury  Outcome: Ongoing, Progressing     Problem: Restraint, Nonbehavioral (Nonviolent)  Goal: Absence of Harm or Injury  Outcome: Ongoing, Progressing     Problem: Infection  Goal: Absence of Infection Signs and Symptoms  Outcome: Ongoing, Progressing

## 2022-04-26 NOTE — NURSING
Notied MD of patient being discharged from PEC/CEC By pyschiatrist .Patient still has sitter at bedside until MD discharges him.

## 2022-04-26 NOTE — PLAN OF CARE
Patient cleared for discharge from case management standpoint.    Follow up appointments scheduled and added to AVS. Patient referred to [facility].  provided patient with [place] [employer] health brochure and arranged appointment. Provided patient with Medicaid application phone number with [place] speaking agent.    Chart and discharge orders reviewed.  Patient discharged home with no further case management needs.         04/26/22 1118   Final Note   Assessment Type Final Discharge Note   Anticipated Discharge Disposition Home   What phone number can be called within the next 1-3 days to see how you are doing after discharge? [phone]   Post-Acute Status   Discharge Delays None known at this time

## 2022-05-03 NOTE — HOSPITAL COURSE
Mr. Tal Echeverria is a 36-year-old male with a past medical history of hypertension, GERD, and alcohol use disorder drinking up to 15 beers a night who presents today with complaints of abdominal pain.  It is moderate.  It is associated with nausea and anorexia.  He denies fever, vomiting or diarrhea.  He is Icelandic-speaking and Maricarmen was used for translation.  He was noted to be having hallucinations and  tremulous in the ED.  He reports seeing chickens and reportedly trying to exercise demons from his abdomen.  History is complicated due to language barrier and  having difficulty understanding patient.  Apparently his last drink was on Sunday.  When asked how many days a week he is drinking he reports 220 per the .  He was noted to have mild transaminitis with a lipase of 177 in the ED.  per chart review he has had persistently elevated lipases in the past with the most recent visit 4 days ago.  He has also had multiple visits to the ED for hallucinations/psychosis/and suicidal ideations.  He is currently PEC'd by ER MD denying suicidal ideations at this time.  He is given 2 doses of Ativan with improvement of tremulousness.  UDS is negative.  Ethanol level is below 5. Hospital medicine consult for admission for acute alcohol withdrawal.    Patient was initially admitted to the ICU for close monitoring.  His put on CIWA protocol with scheduled and p.r.n. benzodiazepines.  For the next few days it several episodes of agitation, but ultimately was able to be weaned off of benzodiazepines.    A psychiatry consult was placed with recommendations for outpatient follow-up.    Patient was ultimately discharged on Lexapro and Abilify with referral to [facility] for primary care.    My normal physical exam

## 2022-05-03 NOTE — DISCHARGE SUMMARY
[facility] Medicine  Discharge Summary      Patient Name: Lawrence Echeverria  MRN: [id number]  Patient Class: IP- Inpatient  Admission Date: 4/22/2022  Hospital Length of Stay: 4 days  Discharge Date and Time: 4/26/2022 12:23 PM  Attending Physician: No att. providers found   Discharging Provider: Ernesto Copeland MD  Primary Care Provider: [facility]      HPI:   Mr. Tal Echeverria is a 36-year-old male with a past medical history of hypertension, GERD, and alcohol use disorder drinking up to 15 beers a night who presents today with complaints of abdominal pain.  It is moderate.  It is associated with nausea and anorexia.  He denies fever, vomiting or diarrhea.  He is Irish-speaking and Maricarmen was used for translation.  He was noted to be having hallucinations and  tremulous in the ED.  He reports seeing chickens and reportedly trying to exercise demons from his abdomen.  History is complicated due to language barrier and  having difficulty understanding patient.  Apparently his last drink was on Sunday.  When asked how many days a week he is drinking he reports 220 per the .  He was noted to have mild transaminitis with a lipase of 177 in the ED.  per chart review he has had persistently elevated lipases in the past with the most recent visit 4 days ago.  He has also had multiple visits to the ED for hallucinations/psychosis/and suicidal ideations.  He is currently PEC'd by ER MD denying suicidal ideations at this time.  He is given 2 doses of Ativan with improvement of tremulousness.  UDS is negative.  Ethanol level is below 5. Hospital medicine consult for admission for acute alcohol withdrawal.      * No surgery found *      Hospital Course:   Mr. Tal Echeverria is a 36-year-old male with a past medical history of hypertension, GERD, and alcohol use disorder drinking up to 15 beers a night who presents today with  complaints of abdominal pain.  It is moderate.  It is associated with nausea and anorexia.  He denies fever, vomiting or diarrhea.  He is Kuwaiti-speaking and Maricarmen was used for translation.  He was noted to be having hallucinations and  tremulous in the ED.  He reports seeing chickens and reportedly trying to exercise demons from his abdomen.  History is complicated due to language barrier and  having difficulty understanding patient.  Apparently his last drink was on Sunday.  When asked how many days a week he is drinking he reports 220 per the .  He was noted to have mild transaminitis with a lipase of 177 in the ED.  per chart review he has had persistently elevated lipases in the past with the most recent visit 4 days ago.  He has also had multiple visits to the ED for hallucinations/psychosis/and suicidal ideations.  He is currently PEC'd by ER MD denying suicidal ideations at this time.  He is given 2 doses of Ativan with improvement of tremulousness.  UDS is negative.  Ethanol level is below 5. Hospital medicine consult for admission for acute alcohol withdrawal.    Patient was initially admitted to the ICU for close monitoring.  His put on CIWA protocol with scheduled and p.r.n. benzodiazepines.  For the next few days it several episodes of agitation, but ultimately was able to be weaned off of benzodiazepines.    A psychiatry consult was placed with recommendations for outpatient follow-up.    Patient was ultimately discharged on Lexapro and Abilify with referral to [facility] for primary care.    My normal physical exam       Goals of Care Treatment Preferences:  Code Status: Full Code      Consults:     Elevated lipase        Hypertension  Continue home medications monitor blood pressure        Final Active Diagnoses:    Diagnosis Date Noted POA    PRINCIPAL PROBLEM:  Alcohol withdrawal syndrome with complication [F10.239] 11/08/2021 Yes    Elevated lipase [R74.8] 04/22/2022 Yes     Hypertension [I10] 11/09/2021 Yes      Problems Resolved During this Admission:       Discharged Condition: good    Disposition: Home or Self Care    Follow Up:   Follow-up Information     [facility] Follow up on 6/11/2022.    Why: 11:00 am  Contact information:  [place] [address]  [phone]                       Patient Instructions:      Ambulatory referral/consult to Outpatient Case Management   Referral Priority: Routine Referral Type: Consultation   Referral Reason: Specialty Services Required   Number of Visits Requested: 1       Significant Diagnostic Studies: Labs:   BMP: No results for input(s): GLU, NA, K, CL, CO2, BUN, CREATININE, CALCIUM, MG in the last 48 hours., CBC No results for input(s): WBC, HGB, HCT, PLT in the last 48 hours., Lipid Panel   Lab Results   Component Value Date    TRIG 70 04/22/2022    and All labs within the past 24 hours have been reviewed    Pending Diagnostic Studies:     None         Medications:  Reconciled Home Medications:      Medication List      START taking these medications    ARIPiprazole 5 MG Tab  Commonly known as: ABILIFY  Take 1 tablet (5 mg total) by mouth every evening.  [place] 1 tableta (5 mg en total) por vía oral todas las noches.     EScitalopram oxalate 5 MG Tab  Commonly known as: LEXAPRO  [place] krishna tableta (5 mg en total) por vía oral diariamente.  (Take 1 tablet (5 mg total) by mouth once daily.)        CONTINUE taking these medications    busPIRone 5 MG Tab  Commonly known as: BUSPAR  Take 5 mg by mouth 2 (two) times daily.     folic acid 1 MG tablet  Commonly known as: FOLVITE  Take 1 tablet (1 mg total) by mouth once daily.     labetaloL 200 MG tablet  Commonly known as: NORMODYNE  Take 1 tablet (200 mg total) by mouth every 12 (twelve) hours.     pantoprazole 20 MG tablet  Commonly known as: PROTONIX  Take 2 tablets (40 mg total) by mouth once daily.     thiamine 100 MG tablet  Take 1 tablet  (100 mg total) by mouth 3 (three) times daily.        STOP taking these medications    chlordiazepoxide 25 MG Cap  Commonly known as: LIBRIUM     ibuprofen 200 MG tablet  Commonly known as: ADVIL,MOTRIN     multivitamin Tab     ondansetron 4 MG tablet  Commonly known as: ZOFRAN     ondansetron 4 MG Tbdl  Commonly known as: ZOFRAN-ODT     oxyCODONE-acetaminophen 5-325 mg per tablet  Commonly known as: PERCOCET     sucralfate 1 gram tablet  Commonly known as: CARAFATE            Indwelling Lines/Drains at time of discharge:   Lines/Drains/Airways     None                 Time spent on the discharge of patient: 35 minutes         Ernesto Copeland MD  Department of Hospital Medicine  [facility]

## 2022-05-08 ENCOUNTER — HOSPITAL ENCOUNTER (EMERGENCY)
Facility: HOSPITAL | Age: 37
Discharge: HOME | End: 2022-05-08
Attending: EMERGENCY MEDICINE

## 2022-05-08 VITALS
TEMPERATURE: 98 F | HEART RATE: 82 BPM | DIASTOLIC BLOOD PRESSURE: 98 MMHG | BODY MASS INDEX: 23.39 KG/M2 | WEIGHT: 137 LBS | RESPIRATION RATE: 18 BRPM | HEIGHT: 64 IN | SYSTOLIC BLOOD PRESSURE: 143 MMHG | OXYGEN SATURATION: 98 %

## 2022-05-08 DIAGNOSIS — T14.90XA TRAUMA: ICD-10-CM

## 2022-05-08 DIAGNOSIS — S61.412A LACERATION OF LEFT HAND WITHOUT FOREIGN BODY, INITIAL ENCOUNTER: Primary | ICD-10-CM

## 2022-05-08 LAB
ALBUMIN SERPL BCP-MCNC: 4.1 G/DL (ref 3.5–5.2)
ALP SERPL-CCNC: 80 U/L (ref 55–135)
ALT SERPL W/O P-5'-P-CCNC: 31 U/L (ref 10–44)
ANION GAP SERPL CALC-SCNC: 16 MMOL/L (ref 8–16)
AST SERPL-CCNC: 35 U/L (ref 10–40)
BASOPHILS # BLD AUTO: 0.04 K/UL (ref 0–0.2)
BASOPHILS NFR BLD: 0.6 % (ref 0–1.9)
BILIRUB SERPL-MCNC: 0.4 MG/DL (ref 0.1–1)
BUN SERPL-MCNC: 7 MG/DL (ref 6–20)
CALCIUM SERPL-MCNC: 8.4 MG/DL (ref 8.7–10.5)
CHLORIDE SERPL-SCNC: 97 MMOL/L (ref 95–110)
CO2 SERPL-SCNC: 21 MMOL/L (ref 23–29)
CREAT SERPL-MCNC: 0.8 MG/DL (ref 0.5–1.4)
DIFFERENTIAL METHOD: ABNORMAL
EOSINOPHIL # BLD AUTO: 0 K/UL (ref 0–0.5)
EOSINOPHIL NFR BLD: 0.4 % (ref 0–8)
ERYTHROCYTE [DISTWIDTH] IN BLOOD BY AUTOMATED COUNT: 13.7 % (ref 11.5–14.5)
EST. GFR  (AFRICAN AMERICAN): >60 ML/MIN/1.73 M^2
EST. GFR  (NON AFRICAN AMERICAN): >60 ML/MIN/1.73 M^2
GLUCOSE SERPL-MCNC: 187 MG/DL (ref 70–110)
HCT VFR BLD AUTO: 38.1 % (ref 40–54)
HGB BLD-MCNC: 13.2 G/DL (ref 14–18)
IMM GRANULOCYTES # BLD AUTO: 0.01 K/UL (ref 0–0.04)
IMM GRANULOCYTES NFR BLD AUTO: 0.1 % (ref 0–0.5)
LYMPHOCYTES # BLD AUTO: 1.3 K/UL (ref 1–4.8)
LYMPHOCYTES NFR BLD: 19.3 % (ref 18–48)
MCH RBC QN AUTO: 30.9 PG (ref 27–31)
MCHC RBC AUTO-ENTMCNC: 34.6 G/DL (ref 32–36)
MCV RBC AUTO: 89 FL (ref 82–98)
MONOCYTES # BLD AUTO: 0.4 K/UL (ref 0.3–1)
MONOCYTES NFR BLD: 6.3 % (ref 4–15)
NEUTROPHILS # BLD AUTO: 5 K/UL (ref 1.8–7.7)
NEUTROPHILS NFR BLD: 73.3 % (ref 38–73)
NRBC BLD-RTO: 0 /100 WBC
PLATELET # BLD AUTO: 494 K/UL (ref 150–450)
PMV BLD AUTO: 8.9 FL (ref 9.2–12.9)
POTASSIUM SERPL-SCNC: 3.5 MMOL/L (ref 3.5–5.1)
PROT SERPL-MCNC: 7.5 G/DL (ref 6–8.4)
RBC # BLD AUTO: 4.27 M/UL (ref 4.6–6.2)
SODIUM SERPL-SCNC: 134 MMOL/L (ref 136–145)
WBC # BLD AUTO: 6.84 K/UL (ref 3.9–12.7)

## 2022-05-08 PROCEDURE — 93010 EKG 12-LEAD: ICD-10-PCS | Mod: ,,, | Performed by: GENERAL PRACTICE

## 2022-05-08 PROCEDURE — 63600175 PHARM REV CODE 636 W HCPCS: Performed by: STUDENT IN AN ORGANIZED HEALTH CARE EDUCATION/TRAINING PROGRAM

## 2022-05-08 PROCEDURE — 93005 ELECTROCARDIOGRAM TRACING: CPT | Performed by: GENERAL PRACTICE

## 2022-05-08 PROCEDURE — 25000003 PHARM REV CODE 250: Performed by: STUDENT IN AN ORGANIZED HEALTH CARE EDUCATION/TRAINING PROGRAM

## 2022-05-08 PROCEDURE — 90715 TDAP VACCINE 7 YRS/> IM: CPT | Performed by: STUDENT IN AN ORGANIZED HEALTH CARE EDUCATION/TRAINING PROGRAM

## 2022-05-08 PROCEDURE — 12044 INTMD RPR N-HF/GENIT7.6-12.5: CPT

## 2022-05-08 PROCEDURE — 90471 IMMUNIZATION ADMIN: CPT | Performed by: STUDENT IN AN ORGANIZED HEALTH CARE EDUCATION/TRAINING PROGRAM

## 2022-05-08 PROCEDURE — 85025 COMPLETE CBC W/AUTO DIFF WBC: CPT | Performed by: STUDENT IN AN ORGANIZED HEALTH CARE EDUCATION/TRAINING PROGRAM

## 2022-05-08 PROCEDURE — 93010 ELECTROCARDIOGRAM REPORT: CPT | Mod: ,,, | Performed by: GENERAL PRACTICE

## 2022-05-08 PROCEDURE — 96375 TX/PRO/DX INJ NEW DRUG ADDON: CPT

## 2022-05-08 PROCEDURE — 80053 COMPREHEN METABOLIC PANEL: CPT | Performed by: STUDENT IN AN ORGANIZED HEALTH CARE EDUCATION/TRAINING PROGRAM

## 2022-05-08 PROCEDURE — 96374 THER/PROPH/DIAG INJ IV PUSH: CPT

## 2022-05-08 PROCEDURE — 99284 EMERGENCY DEPT VISIT MOD MDM: CPT | Mod: 25

## 2022-05-08 RX ORDER — LIDOCAINE HYDROCHLORIDE AND EPINEPHRINE 5; 5 MG/ML; UG/ML
1 INJECTION, SOLUTION INFILTRATION; PERINEURAL ONCE
Status: COMPLETED | OUTPATIENT
Start: 2022-05-08 | End: 2022-05-08

## 2022-05-08 RX ORDER — SILVER NITRATE 38.21; 12.74 MG/1; MG/1
1 STICK TOPICAL ONCE
Status: COMPLETED | OUTPATIENT
Start: 2022-05-08 | End: 2022-05-08

## 2022-05-08 RX ORDER — ONDANSETRON 2 MG/ML
4 INJECTION INTRAMUSCULAR; INTRAVENOUS
Status: COMPLETED | OUTPATIENT
Start: 2022-05-08 | End: 2022-05-08

## 2022-05-08 RX ORDER — HYDROMORPHONE HYDROCHLORIDE 1 MG/ML
1 INJECTION, SOLUTION INTRAMUSCULAR; INTRAVENOUS; SUBCUTANEOUS
Status: DISCONTINUED | OUTPATIENT
Start: 2022-05-08 | End: 2022-05-08

## 2022-05-08 RX ORDER — HYDROMORPHONE HYDROCHLORIDE 1 MG/ML
1 INJECTION, SOLUTION INTRAMUSCULAR; INTRAVENOUS; SUBCUTANEOUS
Status: COMPLETED | OUTPATIENT
Start: 2022-05-08 | End: 2022-05-08

## 2022-05-08 RX ORDER — CEPHALEXIN 500 MG/1
500 CAPSULE ORAL 4 TIMES DAILY
Qty: 20 CAPSULE | Refills: 0 | Status: SHIPPED | OUTPATIENT
Start: 2022-05-08 | End: 2022-05-13

## 2022-05-08 RX ORDER — SODIUM CHLORIDE 9 MG/ML
1000 INJECTION, SOLUTION INTRAVENOUS
Status: COMPLETED | OUTPATIENT
Start: 2022-05-08 | End: 2022-05-08

## 2022-05-08 RX ADMIN — ONDANSETRON 4 MG: 2 INJECTION INTRAMUSCULAR; INTRAVENOUS at 09:05

## 2022-05-08 RX ADMIN — SILVER NITRATE APPLICATORS 3 APPLICATOR: 25; 75 STICK TOPICAL at 09:05

## 2022-05-08 RX ADMIN — CLOSTRIDIUM TETANI TOXOID ANTIGEN (FORMALDEHYDE INACTIVATED), CORYNEBACTERIUM DIPHTHERIAE TOXOID ANTIGEN (FORMALDEHYDE INACTIVATED), BORDETELLA PERTUSSIS TOXOID ANTIGEN (GLUTARALDEHYDE INACTIVATED), BORDETELLA PERTUSSIS FILAMENTOUS HEMAGGLUTININ ANTIGEN (FORMALDEHYDE INACTIVATED), BORDETELLA PERTUSSIS PERTACTIN ANTIGEN, AND BORDETELLA PERTUSSIS FIMBRIAE 2/3 ANTIGEN 0.5 ML: 5; 2; 2.5; 5; 3; 5 INJECTION, SUSPENSION INTRAMUSCULAR at 09:05

## 2022-05-08 RX ADMIN — LIDOCAINE HYDROCHLORIDE,EPINEPHRINE BITARTRATE 1 ML: 5; .005 INJECTION, SOLUTION INFILTRATION; PERINEURAL at 10:05

## 2022-05-08 RX ADMIN — HYDROMORPHONE HYDROCHLORIDE 1 MG: 1 INJECTION, SOLUTION INTRAMUSCULAR; INTRAVENOUS; SUBCUTANEOUS at 08:05

## 2022-05-08 RX ADMIN — SODIUM CHLORIDE 1000 ML: 0.9 INJECTION, SOLUTION INTRAVENOUS at 09:05

## 2022-05-09 RX ORDER — HYDROMORPHONE HYDROCHLORIDE 1 MG/ML
INJECTION, SOLUTION INTRAMUSCULAR; INTRAVENOUS; SUBCUTANEOUS
Status: DISPENSED
Start: 2022-05-09 | End: 2022-05-09
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
## 2022-09-26 ENCOUNTER — HOSPITAL ENCOUNTER (EMERGENCY)
Facility: HOSPITAL | Age: 37
Discharge: HOME | End: 2022-09-27
Attending: EMERGENCY MEDICINE

## 2022-09-26 DIAGNOSIS — U07.1 COVID: Primary | ICD-10-CM

## 2022-09-26 DIAGNOSIS — K29.20 ACUTE ALCOHOLIC GASTRITIS WITHOUT HEMORRHAGE: ICD-10-CM

## 2022-09-26 DIAGNOSIS — R07.9 CHEST PAIN: ICD-10-CM

## 2022-09-26 DIAGNOSIS — F10.920 ALCOHOLIC INTOXICATION WITHOUT COMPLICATION: ICD-10-CM

## 2022-09-26 LAB
ALBUMIN SERPL BCP-MCNC: 4.3 G/DL (ref 3.5–5.2)
ALP SERPL-CCNC: 93 U/L (ref 55–135)
ALT SERPL W/O P-5'-P-CCNC: 47 U/L (ref 10–44)
AMPHET+METHAMPHET UR QL: NEGATIVE
ANION GAP SERPL CALC-SCNC: 14 MMOL/L (ref 8–16)
AST SERPL-CCNC: 90 U/L (ref 10–40)
BARBITURATES UR QL SCN>200 NG/ML: NEGATIVE
BASOPHILS # BLD AUTO: 0.02 K/UL (ref 0–0.2)
BASOPHILS NFR BLD: 0.3 % (ref 0–1.9)
BENZODIAZ UR QL SCN>200 NG/ML: NEGATIVE
BILIRUB SERPL-MCNC: 0.6 MG/DL (ref 0.1–1)
BNP SERPL-MCNC: 5 PG/ML (ref 0–99)
BUN SERPL-MCNC: 12 MG/DL (ref 6–20)
BZE UR QL SCN: NEGATIVE
CALCIUM SERPL-MCNC: 9.1 MG/DL (ref 8.7–10.5)
CANNABINOIDS UR QL SCN: NEGATIVE
CHLORIDE SERPL-SCNC: 100 MMOL/L (ref 95–110)
CO2 SERPL-SCNC: 23 MMOL/L (ref 23–29)
CREAT SERPL-MCNC: 0.8 MG/DL (ref 0.5–1.4)
CREAT UR-MCNC: 22 MG/DL (ref 23–375)
DIFFERENTIAL METHOD: ABNORMAL
EOSINOPHIL # BLD AUTO: 0 K/UL (ref 0–0.5)
EOSINOPHIL NFR BLD: 0.6 % (ref 0–8)
ERYTHROCYTE [DISTWIDTH] IN BLOOD BY AUTOMATED COUNT: 16.9 % (ref 11.5–14.5)
EST. GFR  (NO RACE VARIABLE): >60 ML/MIN/1.73 M^2
ETHANOL SERPL-MCNC: 359 MG/DL
GLUCOSE SERPL-MCNC: 110 MG/DL (ref 70–110)
HCT VFR BLD AUTO: 39.7 % (ref 40–54)
HGB BLD-MCNC: 13.6 G/DL (ref 14–18)
IMM GRANULOCYTES # BLD AUTO: 0 K/UL (ref 0–0.04)
IMM GRANULOCYTES NFR BLD AUTO: 0 % (ref 0–0.5)
INR PPP: 1.1
LIPASE SERPL-CCNC: 91 U/L (ref 4–60)
LYMPHOCYTES # BLD AUTO: 3.2 K/UL (ref 1–4.8)
LYMPHOCYTES NFR BLD: 46.7 % (ref 18–48)
MCH RBC QN AUTO: 29.4 PG (ref 27–31)
MCHC RBC AUTO-ENTMCNC: 34.3 G/DL (ref 32–36)
MCV RBC AUTO: 86 FL (ref 82–98)
MONOCYTES # BLD AUTO: 0.6 K/UL (ref 0.3–1)
MONOCYTES NFR BLD: 8.4 % (ref 4–15)
NEUTROPHILS # BLD AUTO: 3 K/UL (ref 1.8–7.7)
NEUTROPHILS NFR BLD: 44 % (ref 38–73)
NRBC BLD-RTO: 0 /100 WBC
OPIATES UR QL SCN: NEGATIVE
PCP UR QL SCN>25 NG/ML: NEGATIVE
PLATELET # BLD AUTO: 323 K/UL (ref 150–450)
PMV BLD AUTO: 8.9 FL (ref 9.2–12.9)
POTASSIUM SERPL-SCNC: 3.4 MMOL/L (ref 3.5–5.1)
PROT SERPL-MCNC: 8.2 G/DL (ref 6–8.4)
PROTHROMBIN TIME: 13 SEC (ref 11.4–13.7)
RBC # BLD AUTO: 4.63 M/UL (ref 4.6–6.2)
SARS-COV-2 RDRP RESP QL NAA+PROBE: POSITIVE
SODIUM SERPL-SCNC: 137 MMOL/L (ref 136–145)
TOXICOLOGY INFORMATION: ABNORMAL
TROPONIN I SERPL DL<=0.01 NG/ML-MCNC: <0.03 NG/ML
WBC # BLD AUTO: 6.87 K/UL (ref 3.9–12.7)

## 2022-09-26 PROCEDURE — C9113 INJ PANTOPRAZOLE SODIUM, VIA: HCPCS | Performed by: EMERGENCY MEDICINE

## 2022-09-26 PROCEDURE — 99284 EMERGENCY DEPT VISIT MOD MDM: CPT | Mod: 25

## 2022-09-26 PROCEDURE — 83690 ASSAY OF LIPASE: CPT | Performed by: EMERGENCY MEDICINE

## 2022-09-26 PROCEDURE — 82077 ASSAY SPEC XCP UR&BREATH IA: CPT | Performed by: EMERGENCY MEDICINE

## 2022-09-26 PROCEDURE — 93005 ELECTROCARDIOGRAM TRACING: CPT | Performed by: INTERNAL MEDICINE

## 2022-09-26 PROCEDURE — 25000003 PHARM REV CODE 250: Performed by: EMERGENCY MEDICINE

## 2022-09-26 PROCEDURE — 80053 COMPREHEN METABOLIC PANEL: CPT | Performed by: STUDENT IN AN ORGANIZED HEALTH CARE EDUCATION/TRAINING PROGRAM

## 2022-09-26 PROCEDURE — 96374 THER/PROPH/DIAG INJ IV PUSH: CPT

## 2022-09-26 PROCEDURE — U0002 COVID-19 LAB TEST NON-CDC: HCPCS | Performed by: STUDENT IN AN ORGANIZED HEALTH CARE EDUCATION/TRAINING PROGRAM

## 2022-09-26 PROCEDURE — 85025 COMPLETE CBC W/AUTO DIFF WBC: CPT | Performed by: STUDENT IN AN ORGANIZED HEALTH CARE EDUCATION/TRAINING PROGRAM

## 2022-09-26 PROCEDURE — 36415 COLL VENOUS BLD VENIPUNCTURE: CPT | Performed by: EMERGENCY MEDICINE

## 2022-09-26 PROCEDURE — 85610 PROTHROMBIN TIME: CPT | Performed by: STUDENT IN AN ORGANIZED HEALTH CARE EDUCATION/TRAINING PROGRAM

## 2022-09-26 PROCEDURE — 25000003 PHARM REV CODE 250: Performed by: STUDENT IN AN ORGANIZED HEALTH CARE EDUCATION/TRAINING PROGRAM

## 2022-09-26 PROCEDURE — 80307 DRUG TEST PRSMV CHEM ANLYZR: CPT | Performed by: EMERGENCY MEDICINE

## 2022-09-26 PROCEDURE — 93010 EKG 12-LEAD: ICD-10-PCS | Mod: ,,, | Performed by: INTERNAL MEDICINE

## 2022-09-26 PROCEDURE — 63600175 PHARM REV CODE 636 W HCPCS: Performed by: EMERGENCY MEDICINE

## 2022-09-26 PROCEDURE — 84484 ASSAY OF TROPONIN QUANT: CPT | Performed by: STUDENT IN AN ORGANIZED HEALTH CARE EDUCATION/TRAINING PROGRAM

## 2022-09-26 PROCEDURE — 93010 ELECTROCARDIOGRAM REPORT: CPT | Mod: ,,, | Performed by: INTERNAL MEDICINE

## 2022-09-26 PROCEDURE — 83880 ASSAY OF NATRIURETIC PEPTIDE: CPT | Performed by: STUDENT IN AN ORGANIZED HEALTH CARE EDUCATION/TRAINING PROGRAM

## 2022-09-26 RX ORDER — LIDOCAINE HYDROCHLORIDE 20 MG/ML
15 SOLUTION OROPHARYNGEAL ONCE
Status: COMPLETED | OUTPATIENT
Start: 2022-09-26 | End: 2022-09-26

## 2022-09-26 RX ORDER — PANTOPRAZOLE SODIUM 40 MG/10ML
40 INJECTION, POWDER, LYOPHILIZED, FOR SOLUTION INTRAVENOUS
Status: COMPLETED | OUTPATIENT
Start: 2022-09-26 | End: 2022-09-26

## 2022-09-26 RX ORDER — MAG HYDROX/ALUMINUM HYD/SIMETH 200-200-20
30 SUSPENSION, ORAL (FINAL DOSE FORM) ORAL ONCE
Status: COMPLETED | OUTPATIENT
Start: 2022-09-26 | End: 2022-09-26

## 2022-09-26 RX ORDER — NAPROXEN SODIUM 220 MG/1
324 TABLET, FILM COATED ORAL ONCE
Status: COMPLETED | OUTPATIENT
Start: 2022-09-26 | End: 2022-09-26

## 2022-09-26 RX ADMIN — LIDOCAINE HYDROCHLORIDE 15 ML: 20 SOLUTION ORAL; TOPICAL at 10:09

## 2022-09-26 RX ADMIN — ALUMINUM HYDROXIDE, MAGNESIUM HYDROXIDE, AND SIMETHICONE 30 ML: 200; 200; 20 SUSPENSION ORAL at 10:09

## 2022-09-26 RX ADMIN — ASPIRIN 81 MG CHEWABLE TABLET 324 MG: 81 TABLET CHEWABLE at 09:09

## 2022-09-26 RX ADMIN — PANTOPRAZOLE SODIUM 40 MG: 40 INJECTION, POWDER, LYOPHILIZED, FOR SOLUTION INTRAVENOUS at 10:09

## 2022-09-26 NOTE — Clinical Note
"Lawrence"Helga Echeverria was seen and treated in our emergency department on 9/26/2022.     COVID-19 is present in our communities across the state. There is limited testing for COVID at this time, so not all patients can be tested. In this situation, your employee meets the following criteria:    Lawrence Echeverria has met the criteria for COVID-19 testing and has a POSITIVE result. He can return to work once they are asymptomatic for 24 hours without the use of fever reducing medications AND at least five days from the first positive result. A mask is recommended for 5 days post quarantine.     If you have any questions or concerns, or if I can be of further assistance, please do not hesitate to contact me.    Sincerely,             Carissa Holm MD"

## 2022-09-27 VITALS
SYSTOLIC BLOOD PRESSURE: 162 MMHG | OXYGEN SATURATION: 96 % | TEMPERATURE: 99 F | BODY MASS INDEX: 23.52 KG/M2 | HEART RATE: 62 BPM | DIASTOLIC BLOOD PRESSURE: 82 MMHG | RESPIRATION RATE: 16 BRPM | HEIGHT: 64 IN

## 2022-09-27 VITALS
TEMPERATURE: 98 F | SYSTOLIC BLOOD PRESSURE: 114 MMHG | DIASTOLIC BLOOD PRESSURE: 65 MMHG | OXYGEN SATURATION: 95 % | HEART RATE: 68 BPM | WEIGHT: 137 LBS | BODY MASS INDEX: 23.39 KG/M2 | HEIGHT: 64 IN | RESPIRATION RATE: 15 BRPM

## 2022-09-27 DIAGNOSIS — R07.9 CHEST PAIN: ICD-10-CM

## 2022-09-27 DIAGNOSIS — U07.1 COVID-19 VIRUS DETECTED: ICD-10-CM

## 2022-09-27 DIAGNOSIS — F10.930 ALCOHOL WITHDRAWAL SYNDROME WITHOUT COMPLICATION: Primary | ICD-10-CM

## 2022-09-27 DIAGNOSIS — F10.10 ALCOHOL ABUSE: ICD-10-CM

## 2022-09-27 LAB
ALBUMIN SERPL BCP-MCNC: 4.4 G/DL (ref 3.5–5.2)
ALP SERPL-CCNC: 96 U/L (ref 55–135)
ALT SERPL W/O P-5'-P-CCNC: 49 U/L (ref 10–44)
ANION GAP SERPL CALC-SCNC: 16 MMOL/L (ref 8–16)
AST SERPL-CCNC: 97 U/L (ref 10–40)
BASOPHILS # BLD AUTO: 0.02 K/UL (ref 0–0.2)
BASOPHILS NFR BLD: 0.4 % (ref 0–1.9)
BILIRUB SERPL-MCNC: 1.1 MG/DL (ref 0.1–1)
BNP SERPL-MCNC: 5 PG/ML (ref 0–99)
BUN SERPL-MCNC: 12 MG/DL (ref 6–20)
CALCIUM SERPL-MCNC: 8.8 MG/DL (ref 8.7–10.5)
CHLORIDE SERPL-SCNC: 96 MMOL/L (ref 95–110)
CO2 SERPL-SCNC: 24 MMOL/L (ref 23–29)
CREAT SERPL-MCNC: 0.7 MG/DL (ref 0.5–1.4)
DIFFERENTIAL METHOD: ABNORMAL
EOSINOPHIL # BLD AUTO: 0 K/UL (ref 0–0.5)
EOSINOPHIL NFR BLD: 0 % (ref 0–8)
ERYTHROCYTE [DISTWIDTH] IN BLOOD BY AUTOMATED COUNT: 16.8 % (ref 11.5–14.5)
EST. GFR  (NO RACE VARIABLE): >60 ML/MIN/1.73 M^2
ETHANOL SERPL-MCNC: <5 MG/DL
GLUCOSE SERPL-MCNC: 170 MG/DL (ref 70–110)
HCT VFR BLD AUTO: 40.3 % (ref 40–54)
HGB BLD-MCNC: 13.3 G/DL (ref 14–18)
IMM GRANULOCYTES # BLD AUTO: 0.02 K/UL (ref 0–0.04)
IMM GRANULOCYTES NFR BLD AUTO: 0.4 % (ref 0–0.5)
LYMPHOCYTES # BLD AUTO: 0.5 K/UL (ref 1–4.8)
LYMPHOCYTES NFR BLD: 8.8 % (ref 18–48)
MCH RBC QN AUTO: 28.6 PG (ref 27–31)
MCHC RBC AUTO-ENTMCNC: 33 G/DL (ref 32–36)
MCV RBC AUTO: 87 FL (ref 82–98)
MONOCYTES # BLD AUTO: 0.3 K/UL (ref 0.3–1)
MONOCYTES NFR BLD: 5.1 % (ref 4–15)
NEUTROPHILS # BLD AUTO: 4.6 K/UL (ref 1.8–7.7)
NEUTROPHILS NFR BLD: 85.3 % (ref 38–73)
NRBC BLD-RTO: 0 /100 WBC
PLATELET # BLD AUTO: 294 K/UL (ref 150–450)
PMV BLD AUTO: 9.4 FL (ref 9.2–12.9)
POTASSIUM SERPL-SCNC: 3.9 MMOL/L (ref 3.5–5.1)
PROT SERPL-MCNC: 8.3 G/DL (ref 6–8.4)
RBC # BLD AUTO: 4.65 M/UL (ref 4.6–6.2)
SODIUM SERPL-SCNC: 136 MMOL/L (ref 136–145)
TROPONIN I SERPL DL<=0.01 NG/ML-MCNC: <0.03 NG/ML
TROPONIN I SERPL DL<=0.01 NG/ML-MCNC: <0.03 NG/ML
WBC # BLD AUTO: 5.33 K/UL (ref 3.9–12.7)

## 2022-09-27 PROCEDURE — 85025 COMPLETE CBC W/AUTO DIFF WBC: CPT | Performed by: EMERGENCY MEDICINE

## 2022-09-27 PROCEDURE — 84484 ASSAY OF TROPONIN QUANT: CPT | Performed by: EMERGENCY MEDICINE

## 2022-09-27 PROCEDURE — 80053 COMPREHEN METABOLIC PANEL: CPT | Performed by: EMERGENCY MEDICINE

## 2022-09-27 PROCEDURE — 36415 COLL VENOUS BLD VENIPUNCTURE: CPT | Performed by: EMERGENCY MEDICINE

## 2022-09-27 PROCEDURE — 36000 PLACE NEEDLE IN VEIN: CPT

## 2022-09-27 PROCEDURE — 83880 ASSAY OF NATRIURETIC PEPTIDE: CPT | Performed by: EMERGENCY MEDICINE

## 2022-09-27 PROCEDURE — 25000003 PHARM REV CODE 250: Performed by: EMERGENCY MEDICINE

## 2022-09-27 PROCEDURE — 93010 ELECTROCARDIOGRAM REPORT: CPT | Mod: ,,, | Performed by: INTERNAL MEDICINE

## 2022-09-27 PROCEDURE — 99284 EMERGENCY DEPT VISIT MOD MDM: CPT | Mod: 25

## 2022-09-27 PROCEDURE — 82077 ASSAY SPEC XCP UR&BREATH IA: CPT | Performed by: EMERGENCY MEDICINE

## 2022-09-27 PROCEDURE — 63600175 PHARM REV CODE 636 W HCPCS: Performed by: EMERGENCY MEDICINE

## 2022-09-27 PROCEDURE — 84484 ASSAY OF TROPONIN QUANT: CPT | Mod: 91 | Performed by: EMERGENCY MEDICINE

## 2022-09-27 PROCEDURE — 93005 ELECTROCARDIOGRAM TRACING: CPT | Performed by: INTERNAL MEDICINE

## 2022-09-27 PROCEDURE — 93010 EKG 12-LEAD: ICD-10-PCS | Mod: ,,, | Performed by: INTERNAL MEDICINE

## 2022-09-27 RX ORDER — THIAMINE HCL 100 MG
100 TABLET ORAL DAILY
Status: DISCONTINUED | OUTPATIENT
Start: 2022-09-27 | End: 2022-09-27 | Stop reason: HOSPADM

## 2022-09-27 RX ORDER — CHLORDIAZEPOXIDE HYDROCHLORIDE 25 MG/1
25 CAPSULE, GELATIN COATED ORAL 3 TIMES DAILY
Qty: 90 CAPSULE | Refills: 0 | Status: ON HOLD | OUTPATIENT
Start: 2022-09-27 | End: 2023-10-12 | Stop reason: HOSPADM

## 2022-09-27 RX ORDER — PANTOPRAZOLE SODIUM 20 MG/1
40 TABLET, DELAYED RELEASE ORAL DAILY
Qty: 28 TABLET | Refills: 0 | Status: ON HOLD | OUTPATIENT
Start: 2022-09-27 | End: 2023-10-12

## 2022-09-27 RX ORDER — FOLIC ACID 1 MG/1
1 TABLET ORAL DAILY
Status: DISCONTINUED | OUTPATIENT
Start: 2022-09-27 | End: 2022-09-27 | Stop reason: HOSPADM

## 2022-09-27 RX ORDER — DIAZEPAM 5 MG/1
10 TABLET ORAL
Status: COMPLETED | OUTPATIENT
Start: 2022-09-27 | End: 2022-09-27

## 2022-09-27 RX ADMIN — Medication 100 MG: at 01:09

## 2022-09-27 RX ADMIN — SODIUM CHLORIDE, SODIUM LACTATE, POTASSIUM CHLORIDE, AND CALCIUM CHLORIDE 1000 ML: .6; .31; .03; .02 INJECTION, SOLUTION INTRAVENOUS at 01:09

## 2022-09-27 RX ADMIN — DIAZEPAM 10 MG: 5 TABLET ORAL at 01:09

## 2022-09-27 RX ADMIN — FOLIC ACID 1 MG: 1 TABLET ORAL at 01:09

## 2022-09-27 NOTE — ED PROVIDER NOTES
"Encounter Date: 9/26/2022       History     Chief Complaint   Patient presents with    Chest Pain     Emergent evaluation of a Hebrew-speaking 37-year-old male with history of alcoholism, GERD, alcoholic gastritis, hypertension uncontrolled and not taking medications presents to the ER for evaluation of epigastric abdominal pain and chest pain he reports that he feels he is having a panic attack and that his "nerves or shutting down" H&P was performed using .  Patient reports he drank 6 beers within the past 3 hours.  And he began to have abdominal pain and chest pain.  He reports he also feels short of breath.  He denies any wheezing.  He reports nausea but no vomiting.  And reports diarrhea.  He denies fevers.  He reports he feels his having a panic attack.     Review of patient's allergies indicates:  No Known Allergies  Past Medical History:   Diagnosis Date    Alcohol abuse     GERD (gastroesophageal reflux disease)     Hypertension      No past surgical history on file.  No family history on file.  Social History     Substance Use Topics    Alcohol use: Yes    Drug use: Never     Review of Systems   Constitutional:  Negative for activity change, appetite change, chills, diaphoresis, fatigue and fever.   HENT:  Negative for congestion, postnasal drip and rhinorrhea.    Respiratory:  Positive for shortness of breath. Negative for cough, chest tightness, wheezing and stridor.    Cardiovascular:  Positive for chest pain. Negative for palpitations.   Gastrointestinal:  Positive for abdominal pain, diarrhea and nausea. Negative for constipation and vomiting.   Genitourinary:  Negative for dysuria, frequency and urgency.   Musculoskeletal:  Negative for neck pain and neck stiffness.   Skin:  Negative for pallor.   Neurological:  Negative for dizziness, weakness, light-headedness, numbness and headaches.   Psychiatric/Behavioral:  Negative for agitation, behavioral problems, confusion, decreased " concentration, dysphoric mood, hallucinations, self-injury, sleep disturbance and suicidal ideas. The patient is nervous/anxious. The patient is not hyperactive.    All other systems reviewed and are negative.    Physical Exam     Initial Vitals   BP Pulse Resp Temp SpO2   09/26/22 2100 09/26/22 2100 09/26/22 2100 09/26/22 2101 09/26/22 2100   (!) 159/98 79 18 98 °F (36.7 °C) 97 %      MAP       --                Physical Exam    Nursing note and vitals reviewed.  Constitutional: He appears well-developed and well-nourished. He is not diaphoretic. No distress.   Smells of alcohol   HENT:   Head: Normocephalic and atraumatic.   Right Ear: External ear normal.   Left Ear: External ear normal.   Nose: Nose normal.   Mouth/Throat: Oropharynx is clear and moist. No oropharyngeal exudate.   Eyes: Conjunctivae and EOM are normal. Pupils are equal, round, and reactive to light.   Neck: Neck supple. No tracheal deviation present.   Normal range of motion.  Cardiovascular:  Normal rate, regular rhythm, normal heart sounds and intact distal pulses.     Exam reveals no friction rub.       No murmur heard.  Systolic blood pressure 180   Pulmonary/Chest: Breath sounds normal. No stridor. No respiratory distress. He has no wheezes. He has no rhonchi. He has no rales. He exhibits no tenderness.   Sats 97% on room air respirations 15 clear breath sounds bilaterally   Abdominal: Abdomen is soft. Bowel sounds are normal. He exhibits no distension and no mass. There is abdominal tenderness.   Mild tenderness to epigastrium There is no rebound and no guarding.   Musculoskeletal:         General: No edema. Normal range of motion.      Cervical back: Normal range of motion and neck supple.     Neurological: He is alert and oriented to person, place, and time. He has normal strength. No cranial nerve deficit or sensory deficit.   Skin: Skin is warm and dry. No rash noted. No erythema. No pallor.   Psychiatric: His behavior is normal.  Judgment and thought content normal.   Patient is anxious       ED Course   Procedures  Labs Reviewed   CBC W/ AUTO DIFFERENTIAL - Abnormal; Notable for the following components:       Result Value    Hemoglobin 13.6 (*)     Hematocrit 39.7 (*)     RDW 16.9 (*)     MPV 8.9 (*)     All other components within normal limits   COMPREHENSIVE METABOLIC PANEL - Abnormal; Notable for the following components:    Potassium 3.4 (*)     AST 90 (*)     ALT 47 (*)     All other components within normal limits   SARS-COV-2 RNA AMPLIFICATION, QUAL - Abnormal; Notable for the following components:    SARS-CoV-2 RNA, Amplification, Qual Positive (*)     All other components within normal limits   DRUG SCREEN PANEL, URINE EMERGENCY - Abnormal; Notable for the following components:    Creatinine, Urine 22.0 (*)     All other components within normal limits    Narrative:     Specimen Source->Urine   LIPASE - Abnormal; Notable for the following components:    Lipase 91 (*)     All other components within normal limits   ALCOHOL,MEDICAL (ETHANOL) - Abnormal; Notable for the following components:    Alcohol, Serum 359 (*)     All other components within normal limits    Narrative:     Alcohol critical result(s) called and verbal readback obtained from   Madelyn Bruno RN ER  by MS1 09/26/2022 22:34   B-TYPE NATRIURETIC PEPTIDE   TROPONIN I   PROTIME-INR   ALCOHOL,MEDICAL (ETHANOL)   LIPASE     EKG Readings: (Independently Interpreted)   Initial Reading: No STEMI. Rhythm: Normal Sinus Rhythm. Heart Rate: 88. Ectopy: No Ectopy. Conduction: Normal.   ECG Results              EKG 12-lead (In process)  Result time 09/27/22 05:03:04      In process by Interface, Lab In [facility] (09/27/22 05:03:04)                   Narrative:    Test Reason : R07.9,    Vent. Rate : 088 BPM     Atrial Rate : 088 BPM     P-R Int : 154 ms          QRS Dur : 102 ms      QT Int : 344 ms       P-R-T Axes : 038 074 026 degrees     QTc Int : 416 ms    Normal sinus  "rhythm  Normal ECG  When compared with ECG of 08-MAY-2022 20:47,  No significant change was found    Referred By: [email or web address]   SELF           Confirmed By:                                   Imaging Results              X-Ray Chest AP Portable (In process)                   X-Rays:   Independently Interpreted Readings:   Chest X-Ray: No infiltrates.  Normal heart size.  No acute abnormalities.   Medications   aspirin chewable tablet 324 mg (324 mg Oral Given 9/26/22 2121)   aluminum-magnesium hydroxide-simethicone 200-200-20 mg/5 mL suspension 30 mL (30 mLs Oral Given 9/26/22 2210)     And   LIDOcaine HCl 2% oral solution 15 mL (15 mLs Oral Given 9/26/22 2210)   pantoprazole injection 40 mg (40 mg Intravenous Given 9/26/22 2210)     Medical Decision Making:   Independently Interpreted Test(s):   I have ordered and independently interpreted X-rays - see prior notes.  I have ordered and independently interpreted EKG Reading(s) - see prior notes  Clinical Tests:   Lab Tests: Ordered and Reviewed  The following lab test(s) were unremarkable: BNP, Troponin and PT       <> Summary of Lab: Tox screen negative  Alcohol level 359  COVID positive  H&H 13.6 and 39.7  Potassium 3.4 AST 90 ALT 47  Lipase 91  Radiological Study: Ordered and Reviewed  Medical Tests: Ordered and Reviewed  ED Management:  Emergent evaluation of a Mongolian-speaking 37-year-old male with history of alcoholism, GERD, alcoholic gastritis, hypertension uncontrolled and not taking medications presents to the ER for evaluation of epigastric abdominal pain and chest pain he reports that he feels he is having a panic attack and that his "nerves or shutting down" H&P was performed using .  Patient reports he drank 6 beers within the past 3 hours.  And he began to have abdominal pain and chest pain.  He reports he also feels short of breath.  He denies any wheezing.  He reports nausea but no vomiting.  And reports diarrhea.  He denies fevers.  " He reports he feels his having a panic attack.     On physical exam blood pressure was originally elevated in the 180s.  He reports he has not taken blood pressure medicine more than 4 months.  Afebrile 98° pulse of 68 respirations 15 sats are 98% on room air.  Normal cardiac and lung exam.  Mild tenderness in the epigastrium.  Patient does appear anxious.  But he also smells of alcohol.  No peripheral edema no JVD.  He denies any previous cardiac history.  Denies drug use.  Heart score of 1  EKG showed no ischemic changes chest x-ray was clear.  Patient was treated with Protonix 40 mg IV, and a GI cocktail.  Patient fell asleep and rested.  Also been given aspirin 324 mg orally during cardiac workup.  Lab work refer turned with mild anemia and a potassium of 3.4.  Also mild transaminitis with an AST of 90 an ALT of 47.  Lipase of 91.  But a negative troponin BNP.  Patient's alcohol level 359.  Tox screen was negative.  And COVID test was incidentally positive.  Patient's x-ray did not show any signs of atypical pneumonia.  Symptoms not consistent with PE patient has had stable vitals here he was observed for several hours until he had regained sobriety and was able to ambulate without difficulty.  He also reported that his abdominal pain had improved.  He was discharged home with Protonix.  Also needs to resume taking his blood pressure medications.  He has been referred to [facility].  Is been given a work excuse due to his COVID positive status.  Carissa Holm M.D.  5:58 AM 9/27/2022        Additional MDM:   Heart Score:    History:          Slightly suspicious.  ECG:             Normal  Age:               Less than 45 years  Risk factors: 1-2 risk factors  Troponin:       Less than or equal to normal limit  Final Score: 1                       Clinical Impression:   Final diagnoses:  [R07.9] Chest pain  [U07.1] COVID (Primary)  [K29.20] Acute alcoholic gastritis without hemorrhage  [F10.920]  Alcoholic intoxication without complication        ED Disposition Condition    Discharge Stable          ED Prescriptions       Medication Sig Dispense Start Date End Date Auth. Provider    pantoprazole (PROTONIX) 20 MG tablet Take 2 tablets (40 mg total) by mouth once daily. for 14 days 28 tablet 9/27/2022 10/11/2022 Carissa Holm MD          Follow-up Information       Follow up With Specialties Details Why Contact Info Additional Information    [facility] - Emergency Dept Emergency Medicine Go to  If symptoms worsen [address] [facility] [id number] [phone] 1st floor    [facility]  Schedule an appointment as soon as possible for a visit  If your symptoms do not improve [address] [facility] [address]  [phone]                Carissa Holm MD  09/27/22 0559

## 2022-09-27 NOTE — DISCHARGE INSTRUCTIONS
RETURN TO EMERGENCY DEPARTMENT WITHOUT FAIL, IF YOUR SYMPTOMS WORSEN, IF YOU GET NEW OR DIFFERENT SYMPTOMS, IF YOU ARE UNABLE TO FOLLOW UP AS DIRECTED, OR IF YOU HAVE ANY CONCERNS OR WORRIES.    Kaden krishna vitamina - multi diario.     Benjamin medicamentos según lo prescrito   Consulte a laurent médico de syl en little o dos días para krishna evaluación adicional, un tratamiento y el tratamiento según sea necesario.   Evite conducir o manejar maquinaria mientras nataliia medicamentos, ya que algunos medicamentos pueden causar somnolencia y pueden causar problemas.   El examen y el tratamiento que recibió en la Dion de Emergencia fue por un problema urgente y NO SE PRETENDE LIBBY ATENCIÓN COMPLETA. Es importante que CONTINÚE con un médico para recibir atención continua. Si jaleel síntomas empeoran o NO MEJORA y no puede comunicarse con laurent proveedor de atención médica, debe REGRESAR al departamento de emergencia. El médico de la dion de emergencias le ha proporcionado krishna INTERPRETACIÓN PRELIMINAR de todos jaleel ESTUDIOS. Se puede hacer krishna interpretación final después de ser dado de mario alberto. SI SE NECESITA UN CAMBIO en laurent diagnóstico o tratamiento, LE CONTACTAREMOS. Es fundamental que tengamos un NÚMERO DE TELÉFONO ACTUAL PARA USTED.

## 2022-09-27 NOTE — ED PROVIDER NOTES
Encounter Date: 9/27/2022       History     Chief Complaint   Patient presents with    Chest Pain     W/ SOB since last night. +alcohol abuse, last drink yesterday (20 beers). +N/V       used for the entirety of the patient encounter.  This is a 37-year-old male who presents emergency department with chest pain anxiety and detoxing off of alcohol.  Patient says that he some days will drink but some days warm.  Says he has been drinking heavily recently.  Last drink was yesterday at 4:00 p.m., 20 beers.  Says he is feeling nervous and panicky.  Says he has some mild chest pain is center of his chest, nonradiating not associated with any diaphoresis or any shortness of breath.  No vomiting.  Occasional diarrhea he has.  He says that he wants stop drinking.  Denies any other illicit drug use he denies any hallucinations or delusions.  Patient was seen here last night for same presentation and discharged home.    Review of patient's allergies indicates:  No Known Allergies  Past Medical History:   Diagnosis Date    Alcohol abuse     GERD (gastroesophageal reflux disease)     Hypertension      No past surgical history on file.  No family history on file.  Social History     Substance Use Topics    Alcohol use: Yes    Drug use: Never     Review of Systems   Constitutional:  Negative for chills and fever.   HENT:  Negative for sore throat.    Respiratory:  Negative for shortness of breath.    Cardiovascular:  Negative for chest pain, palpitations and leg swelling.   Gastrointestinal:  Positive for diarrhea. Negative for abdominal pain, nausea and vomiting.   Genitourinary:  Negative for dysuria.   Musculoskeletal:  Negative for back pain.   Skin:  Negative for rash.   Neurological:  Negative for seizures, syncope, weakness and headaches.   Hematological:  Does not bruise/bleed easily.   Psychiatric/Behavioral:  The patient is nervous/anxious.    All other systems reviewed and are negative.    Physical  Exam     Initial Vitals [09/27/22 1046]   BP Pulse Resp Temp SpO2   (!) 178/17 (!) 113 18 98.7 °F (37.1 °C) 97 %      MAP       --         Physical Exam    Nursing note and vitals reviewed.  Constitutional: He appears well-developed and well-nourished. He is not diaphoretic. No distress.   HENT:   Head: Normocephalic and atraumatic.   Mouth/Throat: Oropharynx is clear and moist. No oropharyngeal exudate.   Eyes: Conjunctivae and EOM are normal. Pupils are equal, round, and reactive to light.   Neck: Neck supple. No tracheal deviation present.   Normal range of motion.  Cardiovascular:  Normal rate, regular rhythm, normal heart sounds and intact distal pulses.           No murmur heard.  Pulmonary/Chest: Breath sounds normal. No stridor. No respiratory distress. He has no wheezes. He has no rhonchi. He has no rales.   Abdominal: Abdomen is soft. Bowel sounds are normal. He exhibits no distension. There is no abdominal tenderness. There is no rebound and no guarding.   Musculoskeletal:         General: No tenderness or edema. Normal range of motion.      Cervical back: Normal range of motion and neck supple.      Comments: Mild tremors on outstretched hands.     Neurological: He is alert and oriented to person, place, and time. He has normal strength. No cranial nerve deficit or sensory deficit. GCS score is 15. GCS eye subscore is 4. GCS verbal subscore is 5. GCS motor subscore is 6.   Skin: Skin is warm and dry. Capillary refill takes less than 2 seconds. No rash noted. No erythema. No pallor.   Psychiatric: He has a normal mood and affect. Thought content normal.       ED Course   Procedures  Labs Reviewed   CBC W/ AUTO DIFFERENTIAL - Abnormal; Notable for the following components:       Result Value    Hemoglobin 13.3 (*)     RDW 16.8 (*)     Lymph # 0.5 (*)     Gran % 85.3 (*)     Lymph % 8.8 (*)     All other components within normal limits   COMPREHENSIVE METABOLIC PANEL - Abnormal; Notable for the following  components:    Glucose 170 (*)     Total Bilirubin 1.1 (*)     AST 97 (*)     ALT 49 (*)     All other components within normal limits   B-TYPE NATRIURETIC PEPTIDE   TROPONIN I   ALCOHOL,MEDICAL (ETHANOL)   ALCOHOL,MEDICAL (ETHANOL)   TROPONIN I   DRUG SCREEN PANEL, URINE EMERGENCY     Results for orders placed or performed during the hospital encounter of 09/27/22   CBC auto differential   Result Value Ref Range    WBC 5.33 3.90 - 12.70 K/uL    RBC 4.65 4.60 - 6.20 M/uL    Hemoglobin 13.3 (L) 14.0 - 18.0 g/dL    Hematocrit 40.3 40.0 - 54.0 %    MCV 87 82 - 98 fL    MCH 28.6 27.0 - 31.0 pg    MCHC 33.0 32.0 - 36.0 g/dL    RDW 16.8 (H) 11.5 - 14.5 %    Platelets 294 150 - 450 K/uL    MPV 9.4 9.2 - 12.9 fL    Immature Granulocytes 0.4 0.0 - 0.5 %    Gran # (ANC) 4.6 1.8 - 7.7 K/uL    Immature Grans (Abs) 0.02 0.00 - 0.04 K/uL    Lymph # 0.5 (L) 1.0 - 4.8 K/uL    Mono # 0.3 0.3 - 1.0 K/uL    Eos # 0.0 0.0 - 0.5 K/uL    Baso # 0.02 0.00 - 0.20 K/uL    nRBC 0 0 /100 WBC    Gran % 85.3 (H) 38.0 - 73.0 %    Lymph % 8.8 (L) 18.0 - 48.0 %    Mono % 5.1 4.0 - 15.0 %    Eosinophil % 0.0 0.0 - 8.0 %    Basophil % 0.4 0.0 - 1.9 %    Differential Method Automated    Comprehensive metabolic panel   Result Value Ref Range    Sodium 136 136 - 145 mmol/L    Potassium 3.9 3.5 - 5.1 mmol/L    Chloride 96 95 - 110 mmol/L    CO2 24 23 - 29 mmol/L    Glucose 170 (H) 70 - 110 mg/dL    BUN 12 6 - 20 mg/dL    Creatinine 0.7 0.5 - 1.4 mg/dL    Calcium 8.8 8.7 - 10.5 mg/dL    Total Protein 8.3 6.0 - 8.4 g/dL    Albumin 4.4 3.5 - 5.2 g/dL    Total Bilirubin 1.1 (H) 0.1 - 1.0 mg/dL    Alkaline Phosphatase 96 55 - 135 U/L    AST 97 (H) 10 - 40 U/L    ALT 49 (H) 10 - 44 U/L    Anion Gap 16 8 - 16 mmol/L    eGFR >60.0 >60 mL/min/1.73 m^2   Brain natriuretic peptide   Result Value Ref Range    BNP 5 0 - 99 pg/mL   Troponin I   Result Value Ref Range    Troponin I <0.030 <=0.040 ng/mL   Ethanol   Result Value Ref Range    Alcohol, Serum <5 <10 mg/dL    Troponin I   Result Value Ref Range    Troponin I <0.030 <=0.040 ng/mL          ECG Results              EKG 12-lead (Final result)  Result time 09/27/22 12:53:24      Final result by Interface, Lab In [facility] (09/27/22 12:53:24)                   Narrative:    Test Reason : R07.9,    Vent. Rate : 078 BPM     Atrial Rate : 078 BPM     P-R Int : 134 ms          QRS Dur : 100 ms      QT Int : 336 ms       P-R-T Axes : 052 065 052 degrees     QTc Int : 383 ms    Normal sinus rhythm  Normal ECG  When compared with ECG of 26-SEP-2022 21:03,  No significant change was found  Confirmed by Liban Barcenas MD ([id number]) on 9/27/2022 12:53:17 PM    Referred By: ODELLERR   SELF           Confirmed By:Liban Barcenas MD                                  Imaging Results              X-Ray Chest PA And Lateral (Final result)  Result time 09/27/22 11:41:14   Procedure changed from X-Ray Chest AP Portable     Final result by Gilberto Bennett MD (09/27/22 11:41:14)                   Narrative:    Reason: chest pain Chest pain    FINDINGS:  PA and lateral chest compared with 9/26/2022 show normal cardiomediastinal silhouette.    Lungs are clear. Pulmonary vasculature is normal. Bones are normal.    IMPRESSION:  Normal chest.    Electronically signed by:  Gilberto Bennett MD  9/27/2022 11:41 AM CDT Workstation: [id number]                                     Medications   thiamine tablet 100 mg (100 mg Oral Given 9/27/22 1345)   folic acid tablet 1 mg (1 mg Oral Given 9/27/22 1313)   diazePAM tablet 10 mg (10 mg Oral Given 9/27/22 1312)   lactated ringers bolus 1,000 mL (1,000 mLs Intravenous New Bag 9/27/22 1345)     Medical Decision Making:   Clinical Tests:   Lab Tests: Ordered and Reviewed  Radiological Study: Ordered and Reviewed  Medical Tests: Ordered and Reviewed  ED Management:  37-year-old male presents emergency department with alcohol withdrawal, some anxiety.  Patient denies being suicidal or homicidal.  Patient seen yesterday for  chest pain and anxiety.  Says symptoms are still present.  Patient given Valium in the emergency department for mild alcohol withdrawal and his symptoms markedly improved.  Says he feels much better.  Plan is to discharge home on a Librium taper.  As far as his chest pain is likely related to anxiety from alcohol withdrawal.  Doubt ACS, 2 troponins EKG negative.  Doubt PE, low risk Wells score.  Doubt dissection, doubt pneumonia pneumothorax, normal chest x-ray.  Patient given community resources for behavior health and alcohol abuse.  He will follow up on an outpatient basis.    I had a detailed discussion with the patient and/or guardian regarding: The historical points, exam findings, and diagnostic results supporting the discharge diagnosis, lab results, pertinent radiology results, and the need for outpatient follow-up, for definitive care with a family practitioner and to return to the emergency department if symptoms worsen or persist or if there are any questions or concerns that arise at home. All questions have been answered in detail. Strict return to Emergency Department precautions have been provided.    A dictation software program was used for this note.  Please expect some simple typographical  errors in this note.   Additional MDM:   Heart Score:    History:          Slightly suspicious.  ECG:             Normal  Age:               Less than 45 years  Risk factors: 1-2 risk factors  Troponin:       Less than or equal to normal limit  Final Score: 1            ED Course as of 09/27/22 1535   Tue Sep 27, 2022   [id number]  use [JR]   1235 EKG 10:50 a.m. normal sinus rhythm rate of 78.  No ST elevation or depression.  No evidence of ischemia.  Normal intervals.  No STEMI [JR]   1508 I re-evaluated the patient, is heart rate is 67, blood pressure in the 130s.  Says he is feeling much better.  Plan to discharge home with follow-up. [JR]      ED Course User Index  [JR] Frank Gomez,                   Clinical Impression:   Final diagnoses:  [R07.9] Chest pain  [F10.10] Alcohol abuse  [F10.230] Alcohol withdrawal syndrome without complication (Primary)        ED Disposition Condition    Discharge Stable          ED Prescriptions       Medication Sig Dispense Start Date End Date Auth. Provider    chlordiazepoxide (LIBRIUM) 25 MG Cap Take 1 capsule (25 mg total) by mouth 3 (three) times daily. 90 capsule 9/27/2022 10/27/2022 Frnak Gomez DO          Follow-up Information       Follow up With Specialties Details Why Contact Info Additional Information    Access [facility]  In 3 days  [address] [facility] [address]  [phone]       [facility] - Emergency Dept Emergency Medicine  If symptoms worsen [address] [facility] [address]  [phone] 1st floor             Frank Gomez DO  09/27/22 1845

## 2023-03-31 ENCOUNTER — HOSPITAL ENCOUNTER (EMERGENCY)
Facility: HOSPITAL | Age: 38
Discharge: HOME | End: 2023-04-01
Attending: EMERGENCY MEDICINE

## 2023-03-31 DIAGNOSIS — R52 PAIN: Primary | ICD-10-CM

## 2023-03-31 DIAGNOSIS — F10.90 ALCOHOL USE, UNSPECIFIED, UNCOMPLICATED: ICD-10-CM

## 2023-03-31 DIAGNOSIS — T14.90XA TRAUMA: ICD-10-CM

## 2023-03-31 LAB
ALBUMIN SERPL BCP-MCNC: 4.4 G/DL (ref 3.5–5.2)
ALP SERPL-CCNC: 77 U/L (ref 55–135)
ALT SERPL W/O P-5'-P-CCNC: 25 U/L (ref 10–44)
ANION GAP SERPL CALC-SCNC: 14 MMOL/L (ref 8–16)
AST SERPL-CCNC: 35 U/L (ref 10–40)
BASOPHILS # BLD AUTO: 0.04 K/UL (ref 0–0.2)
BASOPHILS NFR BLD: 0.9 % (ref 0–1.9)
BILIRUB SERPL-MCNC: 0.3 MG/DL (ref 0.1–1)
BUN SERPL-MCNC: 7 MG/DL (ref 6–20)
CALCIUM SERPL-MCNC: 9.1 MG/DL (ref 8.7–10.5)
CHLORIDE SERPL-SCNC: 99 MMOL/L (ref 95–110)
CO2 SERPL-SCNC: 27 MMOL/L (ref 23–29)
CREAT SERPL-MCNC: 0.8 MG/DL (ref 0.5–1.4)
DIFFERENTIAL METHOD: ABNORMAL
EOSINOPHIL # BLD AUTO: 0.2 K/UL (ref 0–0.5)
EOSINOPHIL NFR BLD: 3.9 % (ref 0–8)
ERYTHROCYTE [DISTWIDTH] IN BLOOD BY AUTOMATED COUNT: 13.7 % (ref 11.5–14.5)
EST. GFR  (NO RACE VARIABLE): >60 ML/MIN/1.73 M^2
ETHANOL SERPL-MCNC: 169 MG/DL
ETHANOL SERPL-MCNC: 290 MG/DL
GLUCOSE SERPL-MCNC: 101 MG/DL (ref 70–110)
HCT VFR BLD AUTO: 39.5 % (ref 40–54)
HGB BLD-MCNC: 13.7 G/DL (ref 14–18)
IMM GRANULOCYTES # BLD AUTO: 0.02 K/UL (ref 0–0.04)
IMM GRANULOCYTES NFR BLD AUTO: 0.5 % (ref 0–0.5)
LYMPHOCYTES # BLD AUTO: 1.2 K/UL (ref 1–4.8)
LYMPHOCYTES NFR BLD: 26.9 % (ref 18–48)
MCH RBC QN AUTO: 30 PG (ref 27–31)
MCHC RBC AUTO-ENTMCNC: 34.7 G/DL (ref 32–36)
MCV RBC AUTO: 87 FL (ref 82–98)
MONOCYTES # BLD AUTO: 0.2 K/UL (ref 0.3–1)
MONOCYTES NFR BLD: 5.3 % (ref 4–15)
NEUTROPHILS # BLD AUTO: 2.7 K/UL (ref 1.8–7.7)
NEUTROPHILS NFR BLD: 62.5 % (ref 38–73)
NRBC BLD-RTO: 0 /100 WBC
PLATELET # BLD AUTO: 295 K/UL (ref 150–450)
PMV BLD AUTO: 9.3 FL (ref 9.2–12.9)
POTASSIUM SERPL-SCNC: 3.7 MMOL/L (ref 3.5–5.1)
PROT SERPL-MCNC: 8.2 G/DL (ref 6–8.4)
RBC # BLD AUTO: 4.56 M/UL (ref 4.6–6.2)
SODIUM SERPL-SCNC: 140 MMOL/L (ref 136–145)
WBC # BLD AUTO: 4.38 K/UL (ref 3.9–12.7)

## 2023-03-31 PROCEDURE — 99284 EMERGENCY DEPT VISIT MOD MDM: CPT

## 2023-03-31 PROCEDURE — 80053 COMPREHEN METABOLIC PANEL: CPT

## 2023-03-31 PROCEDURE — 82077 ASSAY SPEC XCP UR&BREATH IA: CPT | Performed by: STUDENT IN AN ORGANIZED HEALTH CARE EDUCATION/TRAINING PROGRAM

## 2023-03-31 PROCEDURE — 85025 COMPLETE CBC W/AUTO DIFF WBC: CPT

## 2023-03-31 PROCEDURE — 80307 DRUG TEST PRSMV CHEM ANLYZR: CPT

## 2023-03-31 PROCEDURE — 82077 ASSAY SPEC XCP UR&BREATH IA: CPT | Mod: 91
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
## 2023-04-01 VITALS
WEIGHT: 150 LBS | DIASTOLIC BLOOD PRESSURE: 78 MMHG | RESPIRATION RATE: 16 BRPM | SYSTOLIC BLOOD PRESSURE: 128 MMHG | HEIGHT: 65 IN | OXYGEN SATURATION: 98 % | BODY MASS INDEX: 24.99 KG/M2 | HEART RATE: 95 BPM | TEMPERATURE: 99 F

## 2023-04-01 LAB
AMPHET+METHAMPHET UR QL: NEGATIVE
BARBITURATES UR QL SCN>200 NG/ML: NEGATIVE
BENZODIAZ UR QL SCN>200 NG/ML: NEGATIVE
BZE UR QL SCN: NEGATIVE
CANNABINOIDS UR QL SCN: NEGATIVE
CREAT UR-MCNC: 13 MG/DL (ref 23–375)
METHADONE UR QL SCN>300 NG/ML: NEGATIVE
OPIATES UR QL SCN: NEGATIVE
PCP UR QL SCN>25 NG/ML: NEGATIVE
TOXICOLOGY INFORMATION: ABNORMAL

## 2023-04-01 NOTE — DISCHARGE INSTRUCTIONS
Nga por venir a nuestro Departamento de Emergencias hocielo. Es importante recordar que algunos problemas o condiciones médicas son difíciles de diagnosticar y es posible que no se encuentren yue laurent visita al Departamento de Emergencias.    Asegúrese de hacer un seguimiento con laurent médico de atención primaria y revisar con ellos todos los laboratorios/imágenes/pruebas que se realizaron yue laurent visita a la jeremy de emergencias. Algunos laboratorios/pruebas pueden estar fuera del rango normal y requieren un seguimiento que no sea de emergencia y krishna mayor investigación para ayudar a diagnosticar/excluir/prevenir complicaciones u otras afecciones médicas potencialmente graves que no se abordaron yue laurent visita a la jeremy de emergencias.    Si no tiene un médico de atención primaria, puede comunicarse con el que figura en laurent documentación de mario alberto o también puede llamar al mostrador de citas de la [facility] al [phone] para programar krishna tj y establecer atención con little. Otros recursos para encontrar médicos de atención primaria: [email or web address] Es importante para laurent franklyn que tenga un médico de atención primaria.    Sidman todos los medicamentos según las indicaciones. Todos los medicamentos pueden tener efectos secundarios y es imposible predecir qué medicamentos pueden causar efectos secundarios o qué efectos secundarios (si los hay) le darán. Si siente que está teniendo un efecto negativo o un efecto secundario de algún medicamento, debe dejar de tomarlo inmediatamente y buscar atención médica. Si siente que está teniendo krishna reacción potencialmente mortal, llame al 911.    Regrese a la jeremy de emergencias si tiene preguntas/inquietudes, síntomas nuevos/preocupantes, empeoramiento o falta de mejora.    No conduzca, nade, suba a [place], se bañe, opere maquinaria pesada, yolanda alcohol o tome medicamentos potencialmente sedantes, firme ningún documento legal o tome decisiones importantes yue  24 horas si ha recibido algún medicamento para el dolor, sedantes o alteradores del estado de ánimo. medicamentos yue laurent visita a la jeremy de emergencias o dentro de las 24 horas de haberlos tomado si se los dobbs recetado.    Puede encontrar recursos adicionales para dentistas, audífonos, equipos médicos duraderos, farmacias de bajo costo y otros recursos en [email or web address]

## 2023-05-25 ENCOUNTER — HOSPITAL ENCOUNTER (EMERGENCY)
Facility: HOSPITAL | Age: 38
Discharge: HOME | End: 2023-05-25
Attending: EMERGENCY MEDICINE

## 2023-05-25 VITALS
WEIGHT: 140 LBS | HEIGHT: 62 IN | HEART RATE: 70 BPM | RESPIRATION RATE: 18 BRPM | SYSTOLIC BLOOD PRESSURE: 154 MMHG | TEMPERATURE: 99 F | OXYGEN SATURATION: 97 % | DIASTOLIC BLOOD PRESSURE: 98 MMHG | BODY MASS INDEX: 25.76 KG/M2

## 2023-05-25 DIAGNOSIS — L25.9 CONTACT DERMATITIS, UNSPECIFIED CONTACT DERMATITIS TYPE, UNSPECIFIED TRIGGER: Primary | ICD-10-CM

## 2023-05-25 PROCEDURE — 99282 EMERGENCY DEPT VISIT SF MDM: CPT

## 2023-05-25 RX ORDER — HYDROCORTISONE 25 MG/G
CREAM TOPICAL 2 TIMES DAILY
Qty: 3.5 G | Refills: 0 | Status: ON HOLD | OUTPATIENT
Start: 2023-05-25 | End: 2023-10-12

## 2023-05-25 RX ORDER — PREDNISONE 20 MG/1
40 TABLET ORAL DAILY
Qty: 10 TABLET | Refills: 0 | Status: SHIPPED | OUTPATIENT
Start: 2023-05-25 | End: 2023-05-30

## 2023-05-25 NOTE — ED PROVIDER NOTES
Encounter Date: 5/25/2023       History     Chief Complaint   Patient presents with    Rash     Rash to bilateral arm since yesterday     This is a 38-year-old male complaining of bilateral skin lesions and rashes that itch.    The history is provided by the patient.   Review of patient's allergies indicates:  No Known Allergies  Past Medical History:   Diagnosis Date    Alcohol abuse     GERD (gastroesophageal reflux disease)     Hypertension      No past surgical history on file.  No family history on file.  Social History     Substance Use Topics    Alcohol use: Yes    Drug use: Never     Review of Systems   Skin:  Positive for rash.     Physical Exam     Initial Vitals [05/25/23 1726]   BP Pulse Resp Temp SpO2   (!) 149/101 80 18 98.8 °F (37.1 °C) 95 %      MAP       --         Physical Exam    Nursing note and vitals reviewed.  Constitutional: He appears well-developed and well-nourished. He is not diaphoretic.   HENT:   Head: Atraumatic.   Neck:   Normal range of motion.  Pulmonary/Chest: No respiratory distress.   Abdominal: He exhibits no distension.   Musculoskeletal:      Cervical back: Normal range of motion.     Neurological: He is alert.   Skin:   Few small dry areas of the bilateral arms, appear consistent with contact dermatitis.  Also a few small red papules that look more like bug bites.       ED Course   Procedures  Labs Reviewed - No data to display       Imaging Results    None          Medications - No data to display  Medical Decision Making:   Initial Assessment:   Rash appears consistent with contact dermatitis as well as some bug bites.  No evidence of abscess or infection.  We will treat with steroids.  Return precautions given.                        Clinical Impression:   Final diagnoses:  [L25.9] Contact dermatitis, unspecified contact dermatitis type, unspecified trigger (Primary)        ED Disposition Condition    Discharge Stable          ED Prescriptions       Medication Sig Dispense  July 12, 2022     Patient: Bee Silva   YOB: 1966   Date of Visit: 7/12/2022       To Whom it May Concern:    Bee Silva was seen in my clinic on 7/12/2022 at 9:45 am.    Please excuse Bee for her absence from work on the date listed above to be able to make her appointment.    Sincerely,         Gómez Fernandes, DO    Medical information is confidential and cannot be disclosed without the written consent of the patient or her representative.       Start Date End Date Auth. Provider    predniSONE (DELTASONE) 20 MG tablet Take 2 tablets (40 mg total) by mouth once daily. for 5 days 10 tablet 5/25/2023 5/30/2023 Uli De Dios MD    hydrocortisone 2.5 % cream Apply topically 2 (two) times daily. 3.5 g 5/25/2023 -- Uli De Dios MD          Follow-up Information       Follow up With Specialties Details Why Contact Info    Access [address] [address]  [phone]               lUi De Dios MD  05/25/23 9686

## 2023-05-25 NOTE — FIRST PROVIDER EVALUATION
"Medical screening examination initiated.  I have conducted a focused provider triage encounter, findings are as follows:    Brief history of present illness:  Presents with rash to right and left arms.  Onset yesterday.  Patient admits to 4 beers today.  He appears to be intoxicated.  We used the KEVIN for interpretation.  Even the  we was confused and had very little patients with this gentleman.  He is hypertensive    Vitals:    05/25/23 1726   BP: (!) 149/101   Pulse: 80   Resp: 18   Temp: 98.8 °F (37.1 °C)   TempSrc: Oral   SpO2: 95%   Weight: 63.5 kg (140 lb)   Height: 5' 2" (1.575 m)       Pertinent physical exam:  Patient is inebriated.  There are some areas of rash to the left and right forearm.  There are several open sores where he has scratched the rash.    Brief workup plan:  Awaiting room in the ED.    Preliminary workup initiated; this workup will be continued and followed by the physician or advanced practice provider that is assigned to the patient when roomed.  "

## 2023-10-09 ENCOUNTER — HOSPITAL ENCOUNTER (EMERGENCY)
Facility: HOSPITAL | Age: 38
Discharge: PSYCHIATRIC OR SUBSTANCE USE FACILITY | End: 2023-10-10
Attending: EMERGENCY MEDICINE

## 2023-10-09 DIAGNOSIS — R44.0 AUDITORY HALLUCINATIONS: ICD-10-CM

## 2023-10-09 DIAGNOSIS — K29.20 ALCOHOLIC GASTRITIS WITHOUT BLEEDING, UNSPECIFIED CHRONICITY: Primary | ICD-10-CM

## 2023-10-09 DIAGNOSIS — R07.9 CHEST PAIN: ICD-10-CM

## 2023-10-09 LAB
ALBUMIN SERPL BCP-MCNC: 4.6 G/DL (ref 3.5–5.2)
ALP SERPL-CCNC: 112 U/L (ref 55–135)
ALT SERPL W/O P-5'-P-CCNC: 105 U/L (ref 10–44)
AMPHET+METHAMPHET UR QL: NEGATIVE
ANION GAP SERPL CALC-SCNC: 14 MMOL/L (ref 8–16)
APAP SERPL-MCNC: 0.1 UG/ML (ref 10–20)
AST SERPL-CCNC: 185 U/L (ref 10–40)
BARBITURATES UR QL SCN>200 NG/ML: NEGATIVE
BASOPHILS # BLD AUTO: 0.04 K/UL (ref 0–0.2)
BASOPHILS NFR BLD: 0.9 % (ref 0–1.9)
BENZODIAZ UR QL SCN>200 NG/ML: NEGATIVE
BILIRUB SERPL-MCNC: 0.4 MG/DL (ref 0.1–1)
BNP SERPL-MCNC: 16 PG/ML (ref 0–99)
BUN SERPL-MCNC: 5 MG/DL (ref 6–20)
BZE UR QL SCN: NEGATIVE
CALCIUM SERPL-MCNC: 9.2 MG/DL (ref 8.7–10.5)
CANNABINOIDS UR QL SCN: NEGATIVE
CHLORIDE SERPL-SCNC: 96 MMOL/L (ref 95–110)
CO2 SERPL-SCNC: 28 MMOL/L (ref 23–29)
CREAT SERPL-MCNC: 0.7 MG/DL (ref 0.5–1.4)
CREAT UR-MCNC: 47.8 MG/DL (ref 23–375)
DIFFERENTIAL METHOD: ABNORMAL
EOSINOPHIL # BLD AUTO: 0.1 K/UL (ref 0–0.5)
EOSINOPHIL NFR BLD: 1.2 % (ref 0–8)
ERYTHROCYTE [DISTWIDTH] IN BLOOD BY AUTOMATED COUNT: 15.8 % (ref 11.5–14.5)
EST. GFR  (NO RACE VARIABLE): >60 ML/MIN/1.73 M^2
ETHANOL SERPL-MCNC: 372 MG/DL
GLUCOSE SERPL-MCNC: 109 MG/DL (ref 70–110)
HCT VFR BLD AUTO: 39.8 % (ref 40–54)
HGB BLD-MCNC: 13.9 G/DL (ref 14–18)
IMM GRANULOCYTES # BLD AUTO: 0.01 K/UL (ref 0–0.04)
IMM GRANULOCYTES NFR BLD AUTO: 0.2 % (ref 0–0.5)
LIPASE SERPL-CCNC: 290 U/L (ref 4–60)
LYMPHOCYTES # BLD AUTO: 1.2 K/UL (ref 1–4.8)
LYMPHOCYTES NFR BLD: 27.5 % (ref 18–48)
MAGNESIUM SERPL-MCNC: 1.9 MG/DL (ref 1.6–2.6)
MCH RBC QN AUTO: 31.3 PG (ref 27–31)
MCHC RBC AUTO-ENTMCNC: 34.9 G/DL (ref 32–36)
MCV RBC AUTO: 90 FL (ref 82–98)
MONOCYTES # BLD AUTO: 0.4 K/UL (ref 0.3–1)
MONOCYTES NFR BLD: 8.6 % (ref 4–15)
NEUTROPHILS # BLD AUTO: 2.7 K/UL (ref 1.8–7.7)
NEUTROPHILS NFR BLD: 61.6 % (ref 38–73)
NRBC BLD-RTO: 0 /100 WBC
OPIATES UR QL SCN: NEGATIVE
PCP UR QL SCN>25 NG/ML: NEGATIVE
PLATELET # BLD AUTO: 178 K/UL (ref 150–450)
PMV BLD AUTO: 9.5 FL (ref 9.2–12.9)
POTASSIUM SERPL-SCNC: 3.4 MMOL/L (ref 3.5–5.1)
PROT SERPL-MCNC: 8.6 G/DL (ref 6–8.4)
RBC # BLD AUTO: 4.44 M/UL (ref 4.6–6.2)
SALICYLATES SERPL-MCNC: 3.2 MG/DL (ref 15–30)
SODIUM SERPL-SCNC: 138 MMOL/L (ref 136–145)
TOXICOLOGY INFORMATION: NORMAL
TROPONIN I SERPL HS-MCNC: 12.2 PG/ML (ref 0–14.9)
TROPONIN I SERPL HS-MCNC: 12.9 PG/ML (ref 0–14.9)
WBC # BLD AUTO: 4.32 K/UL (ref 3.9–12.7)

## 2023-10-09 PROCEDURE — 83690 ASSAY OF LIPASE: CPT | Performed by: EMERGENCY MEDICINE

## 2023-10-09 PROCEDURE — 93010 ELECTROCARDIOGRAM REPORT: CPT | Mod: ,,, | Performed by: INTERNAL MEDICINE

## 2023-10-09 PROCEDURE — 84484 ASSAY OF TROPONIN QUANT: CPT | Mod: 91 | Performed by: EMERGENCY MEDICINE

## 2023-10-09 PROCEDURE — G0425 PR INPT TELEHEALTH CONSULT 30M: ICD-10-PCS | Mod: 95,GC,, | Performed by: PSYCHIATRY & NEUROLOGY

## 2023-10-09 PROCEDURE — 81001 URINALYSIS AUTO W/SCOPE: CPT | Performed by: EMERGENCY MEDICINE

## 2023-10-09 PROCEDURE — 83880 ASSAY OF NATRIURETIC PEPTIDE: CPT

## 2023-10-09 PROCEDURE — 84484 ASSAY OF TROPONIN QUANT: CPT

## 2023-10-09 PROCEDURE — 83735 ASSAY OF MAGNESIUM: CPT

## 2023-10-09 PROCEDURE — 80143 DRUG ASSAY ACETAMINOPHEN: CPT

## 2023-10-09 PROCEDURE — 80053 COMPREHEN METABOLIC PANEL: CPT

## 2023-10-09 PROCEDURE — 25000003 PHARM REV CODE 250: Performed by: EMERGENCY MEDICINE

## 2023-10-09 PROCEDURE — 96365 THER/PROPH/DIAG IV INF INIT: CPT

## 2023-10-09 PROCEDURE — 82077 ASSAY SPEC XCP UR&BREATH IA: CPT | Performed by: EMERGENCY MEDICINE

## 2023-10-09 PROCEDURE — 80179 DRUG ASSAY SALICYLATE: CPT

## 2023-10-09 PROCEDURE — 96366 THER/PROPH/DIAG IV INF ADDON: CPT

## 2023-10-09 PROCEDURE — G0425 INPT/ED TELECONSULT30: HCPCS | Mod: 95,GC,, | Performed by: PSYCHIATRY & NEUROLOGY

## 2023-10-09 PROCEDURE — 80307 DRUG TEST PRSMV CHEM ANLYZR: CPT | Performed by: EMERGENCY MEDICINE

## 2023-10-09 PROCEDURE — 93005 ELECTROCARDIOGRAM TRACING: CPT | Performed by: INTERNAL MEDICINE

## 2023-10-09 PROCEDURE — 93010 EKG 12-LEAD: ICD-10-PCS | Mod: ,,, | Performed by: INTERNAL MEDICINE

## 2023-10-09 PROCEDURE — 99285 EMERGENCY DEPT VISIT HI MDM: CPT | Mod: 25

## 2023-10-09 PROCEDURE — 96375 TX/PRO/DX INJ NEW DRUG ADDON: CPT

## 2023-10-09 PROCEDURE — 85025 COMPLETE CBC W/AUTO DIFF WBC: CPT

## 2023-10-09 PROCEDURE — 63600175 PHARM REV CODE 636 W HCPCS: Performed by: EMERGENCY MEDICINE

## 2023-10-09 PROCEDURE — 36415 COLL VENOUS BLD VENIPUNCTURE: CPT

## 2023-10-09 RX ORDER — FAMOTIDINE 10 MG/ML
40 INJECTION INTRAVENOUS
Status: COMPLETED | OUTPATIENT
Start: 2023-10-09 | End: 2023-10-09

## 2023-10-09 RX ORDER — DIAZEPAM 5 MG/1
10 TABLET ORAL
Status: COMPLETED | OUTPATIENT
Start: 2023-10-09 | End: 2023-10-10

## 2023-10-09 RX ADMIN — FAMOTIDINE 40 MG: 10 INJECTION INTRAVENOUS at 07:10

## 2023-10-09 RX ADMIN — ASCORBIC ACID, VITAMIN A PALMITATE, CHOLECALCIFEROL, THIAMINE HYDROCHLORIDE, RIBOFLAVIN-5 PHOSPHATE SODIUM, PYRIDOXINE HYDROCHLORIDE, NIACINAMIDE, DEXPANTHENOL, ALPHA-TOCOPHEROL ACETATE, VITAMIN K1, FOLIC ACID, BIOTIN, CYANOCOBALAMIN: 200; 3300; 200; 6; 3.6; 6; 40; 15; 10; 150; 600; 60; 5 INJECTION, SOLUTION INTRAVENOUS at 10:10

## 2023-10-09 NOTE — ED NOTES
Rounded on pt. Pt sleeping. Eyes open spontaneously, chest rising and falling, respirations even and unlabored. NADN. Bed in lowest and locked position siderails up x2. Call light within reach. Pt encouraged to call staff for any needs. Pt verbalized an understanding. Sitter maintaining visual contact. Will continue to monitor.

## 2023-10-09 NOTE — ED PROVIDER NOTES
Encounter Date: 10/9/2023       History     Chief Complaint   Patient presents with    Mental Health Problem     Chest pain, hearing voices     This is a 38-year-old male with history of alcohol abuse, hypertension, GERD, some form of mental illness comes in complaining of auditory hallucinations, hiccups and chest pain.  Patient reports that he is a daily drinker.  He has been having hiccups and chest pain for the past few days.  Additionally patient reports that he is hearing very loud voices in his head.  They are threatening him.  Reports that he does not wish to hurt anyone or hurt himself but he feels like the voices are going to hurt him.  He denies any other ingestions other than alcohol.  Chest pain is constant and related to the hiccups.  He denies any abdominal pain, nausea or vomiting.  He denies any exacerbating or alleviating factors otherwise.      Review of patient's allergies indicates:  No Known Allergies  Past Medical History:   Diagnosis Date    Alcohol abuse     GERD (gastroesophageal reflux disease)     Hypertension      No past surgical history on file.  No family history on file.  Social History     Substance Use Topics    Alcohol use: Yes    Drug use: Never     Review of Systems   Constitutional:  Negative for chills and fever.   HENT:  Negative for congestion, sore throat and trouble swallowing.    Respiratory:  Negative for cough and shortness of breath.    Cardiovascular:  Positive for chest pain. Negative for palpitations.   Gastrointestinal:  Negative for abdominal pain, diarrhea, nausea and vomiting.   Genitourinary:  Negative for dysuria and flank pain.   Musculoskeletal:  Negative for back pain and neck pain.   Neurological:  Negative for weakness, numbness and headaches.   Psychiatric/Behavioral:  Positive for agitation and hallucinations. Negative for confusion.    All other systems reviewed and are negative.      Physical Exam     Initial Vitals [10/09/23 1648]   BP Pulse Resp  Temp SpO2   (!) 149/93 88 18 97.8 °F (36.6 °C) (!) 92 %      MAP       --         Physical Exam    Nursing note and vitals reviewed.  Constitutional: Vital signs are normal. He appears well-developed and well-nourished.  Non-toxic appearance. He appears distressed.   Patient is anxious and holding his head.  He reports that the voices are very loud.   HENT:   Head: Normocephalic and atraumatic.   Mouth/Throat: Oropharynx is clear and moist.   Eyes: EOM are normal. Pupils are equal, round, and reactive to light.   Neck: Neck supple.   Normal range of motion.  Cardiovascular:  Normal rate, regular rhythm and intact distal pulses.           Pulmonary/Chest: Breath sounds normal. He has no wheezes.   Abdominal: Abdomen is soft. Bowel sounds are normal. There is no abdominal tenderness.   Musculoskeletal:         General: No tenderness or edema. Normal range of motion.      Cervical back: Normal range of motion and neck supple. No rigidity. No muscular tenderness.     Lymphadenopathy:     He has no cervical adenopathy.     He has no axillary adenopathy.   Neurological: He is alert and oriented to person, place, and time. He has normal strength. No cranial nerve deficit or sensory deficit. Gait normal.   Skin: Skin is warm, dry and intact.   Psychiatric:   Agitated, endorses auditory hallucinations, denies any suicidal or homicidal ideations.         ED Course   Procedures  Labs Reviewed   CBC W/ AUTO DIFFERENTIAL - Abnormal; Notable for the following components:       Result Value    RBC 4.44 (*)     Hemoglobin 13.9 (*)     Hematocrit 39.8 (*)     MCH 31.3 (*)     RDW 15.8 (*)     All other components within normal limits   COMPREHENSIVE METABOLIC PANEL - Abnormal; Notable for the following components:    Potassium 3.4 (*)     BUN 5 (*)     Total Protein 8.6 (*)      (*)      (*)     All other components within normal limits   LIPASE - Abnormal; Notable for the following components:    Lipase 290 (*)      All other components within normal limits   ALCOHOL,MEDICAL (ETHANOL) - Abnormal; Notable for the following components:    Alcohol, Serum 372 (*)     All other components within normal limits   ACETAMINOPHEN LEVEL - Abnormal; Notable for the following components:    Acetaminophen (Tylenol), Serum 0.1 (*)     All other components within normal limits   SALICYLATE LEVEL - Abnormal; Notable for the following components:    Salicylate Lvl 3.2 (*)     All other components within normal limits   MAGNESIUM   TROPONIN I HIGH SENSITIVITY   B-TYPE NATRIURETIC PEPTIDE   ACETAMINOPHEN LEVEL   DRUG SCREEN PANEL, URINE EMERGENCY   SALICYLATE LEVEL   TROPONIN I HIGH SENSITIVITY     EKG Readings: (Independently Interpreted)   Time: 1657  Rate: 72 bpm  Normal sinus rhythm  No ST or T wave abnormality  Unchanged from prior.     ECG Results              EKG 12-lead (In process)  Result time 10/09/23 17:25:25      In process by Interface, Lab In [facility] (10/09/23 17:25:25)                   Narrative:    Test Reason : R07.9,    Vent. Rate : 072 BPM     Atrial Rate : 072 BPM     P-R Int : 162 ms          QRS Dur : 104 ms      QT Int : 346 ms       P-R-T Axes : 020 069 043 degrees     QTc Int : 378 ms    Normal sinus rhythm  Normal ECG  When compared with ECG of 27-SEP-2022 10:50,  No significant change was found    Referred By: [email or web address]   SELF           Confirmed By:                                   Imaging Results              X-Ray Chest AP Portable (Final result)  Result time 10/09/23 18:00:16      Final result by Sana Garvey DO (10/09/23 18:00:16)                   Narrative:    AP chest radiograph: 10/9/2023 6:00 PM CDT    History: 38 years  old Male with Chest Pain.    Comparison: Chest radiograph performed 9/27/2022.    Findings: The cardiomediastinal silhouette is at the upper limits of normal in size.    No pneumothorax is seen.    No acute airspace opacities are seen.    No discrete pleural effusion is  apparent.    Impression: No acute airspace opacities are seen.    Electronically signed by:  Sana Garvey   10/09/2023 06:00 PM CDT Workstation: [id number]                                     Medications   sodium chloride 0.9% 1,000 mL with mvi, (ADULT) no.4 with vit K 3,300 unit- 150 mcg/10 mL 10 mL, thiamine 100 mg, folic acid 1 mg infusion (has no administration in time range)   famotidine (PF) injection 40 mg (40 mg Intravenous Given 10/9/23 1941)     Medical Decision Making  This is a 38-year-old male with history of alcohol abuse, hypertension, GERD, some form of mental illness comes in complaining of chest pain auditory hallucinations.  On examination patient's vitals are stable.  On physical exam he is alert and oriented.  He is neurologically intact.  He is very anxious appearing.  He endorses auditory hallucinations.    Orders included EKG, CBC, CMP, troponin, lipase, blood alcohol level, UDS, acetaminophen level and salicylate levels.  He was placed on elopement and suicide precautions.  Tele psych were consulted.  He was given IV Pepcid.    Comorbidities contributing to patient's presentation include prior history of mental illness, alcohol abuse.  Acute exacerbation of chronic illness: None    I reviewed patient's external medical notes:  He has a history of multiple ER visits with similar presentation.    Differential diagnosis includes GERD, gastritis, pancreatitis, ACS, acute exacerbation of psychiatric illness, auditory hallucinations, ingestion.          Amount and/or Complexity of Data Reviewed  Labs: ordered.     Details: Labs were reviewed and were significant for an elevated alcohol level.  Lipase was mildly elevated.  Radiology: ordered and independent interpretation performed.     Details: Chest x-ray was independently reviewed by me and showed no infiltrate or effusion.  ECG/medicine tests: ordered and independent interpretation performed. Decision-making details documented in ED  Course.    Risk  Prescription drug management.    MDM continued:  Patient's workup is consistent with alcoholic gastritis.  He is tolerating p.o. here.  He has no abdominal pain.  Is having auditory hallucinations however.  Patient was evaluated by tele psych.  He reported to Psychiatry hallucinations of the devil and thoughts of harm.  The patient was PEC'd.  He was medically cleared.  He will be transferred to inpatient psych.                             Clinical Impression:   Final diagnoses:  [R07.9] Chest pain  [K29.20] Alcoholic gastritis without bleeding, unspecified chronicity (Primary)  [R44.0] Auditory hallucinations        ED Disposition Condition    Transfer to Psych Facility Stable          ED Prescriptions    None       Follow-up Information    None          Helga Ordaz MD  10/09/23 4363

## 2023-10-10 VITALS
RESPIRATION RATE: 18 BRPM | WEIGHT: 140 LBS | OXYGEN SATURATION: 97 % | SYSTOLIC BLOOD PRESSURE: 148 MMHG | HEIGHT: 62 IN | DIASTOLIC BLOOD PRESSURE: 80 MMHG | HEART RATE: 88 BPM | BODY MASS INDEX: 25.76 KG/M2 | TEMPERATURE: 99 F

## 2023-10-10 LAB
BACTERIA #/AREA URNS HPF: NEGATIVE /HPF
BILIRUB UR QL STRIP: NEGATIVE
CLARITY UR: CLEAR
COLOR UR: YELLOW
ETHANOL SERPL-MCNC: 127 MG/DL
GLUCOSE UR QL STRIP: NEGATIVE
HGB UR QL STRIP: NEGATIVE
HYALINE CASTS #/AREA URNS LPF: 1 /LPF
KETONES UR QL STRIP: NEGATIVE
LEUKOCYTE ESTERASE UR QL STRIP: NEGATIVE
MICROSCOPIC COMMENT: ABNORMAL
NITRITE UR QL STRIP: NEGATIVE
PH UR STRIP: 7 [PH] (ref 5–8)
PROT UR QL STRIP: ABNORMAL
RBC #/AREA URNS HPF: 6 /HPF (ref 0–4)
SP GR UR STRIP: 1.01 (ref 1–1.03)
SQUAMOUS #/AREA URNS HPF: 0 /HPF
URN SPEC COLLECT METH UR: ABNORMAL
UROBILINOGEN UR STRIP-ACNC: NEGATIVE EU/DL
WBC #/AREA URNS HPF: 4 /HPF (ref 0–5)

## 2023-10-10 PROCEDURE — 25000003 PHARM REV CODE 250: Performed by: EMERGENCY MEDICINE

## 2023-10-10 PROCEDURE — 82077 ASSAY SPEC XCP UR&BREATH IA: CPT | Performed by: STUDENT IN AN ORGANIZED HEALTH CARE EDUCATION/TRAINING PROGRAM

## 2023-10-10 RX ORDER — POTASSIUM CHLORIDE 750 MG/1
30 CAPSULE, EXTENDED RELEASE ORAL ONCE
Status: DISCONTINUED | OUTPATIENT
Start: 2023-10-10 | End: 2023-10-10

## 2023-10-10 RX ORDER — POTASSIUM CHLORIDE 750 MG/1
30 CAPSULE, EXTENDED RELEASE ORAL ONCE
Status: COMPLETED | OUTPATIENT
Start: 2023-10-10 | End: 2023-10-10

## 2023-10-10 RX ADMIN — DIAZEPAM 10 MG: 5 TABLET ORAL at 02:10

## 2023-10-10 RX ADMIN — POTASSIUM CHLORIDE 30 MEQ: 750 CAPSULE, EXTENDED RELEASE ORAL at 03:10

## 2023-10-10 NOTE — ED NOTES
Pt in bed resting comfortably. RR even and unlabored, Clutter/Hazard free environment maintained, pt visualization maintained, pt displays no needs or concerns at this time.

## 2023-10-10 NOTE — ED NOTES
Pt in bed resting comfortably, RR even and unlabored, clean/ clutter free environment maintained. Pt denies any needs or concerns at this time

## 2023-10-10 NOTE — ED NOTES
Pt resting in bed comfortably w/ eyes closed, RR even and unlabored. Clean, clutter free environment maintained, pt denies any needs or concerns at this time.

## 2023-10-10 NOTE — CONSULTS
"[facility]  Psychiatry  Telepsychiatry Consult Note    Please see previous notes:    Patient agreeable to consultation via telepsychiatry.    Tele-Consultation from Psychiatry started: 10/9/2023 at 2019  The chief complaint leading to psychiatric consultation is: auditory hallucinations  This consultation was requested by Dr. Helga Ordaz, the Emergency Department attending physician.  The location of the consulting psychiatrist is  [place], [place] .  The patient location is  [facility] EMERGENCY DEPARTMENT   The patient arrived at the ED at: 1820    Also present with the patient at the time of the consultation: none    Patient Identification:   Lawrence Echeverria is a 38 y.o. male.    Patient information was obtained from patient.  Patient presented voluntarily to the Emergency Department    Inpatient consult to Telemedicine - Psychiatry  Consult performed by: Martha Valdez MD  Consult ordered by: Helga Ordaz MD  Reason for consult: auditory hallucinations        Consult Start Time: 10/09/2023 20:19 CDT  Consult End Time: 10/09/2023 21:00 CDT        Subjective:     History of Present Illness:   #[id number]    Patient is a 38yoM with past psychiatric history of unspecified psychotic disorder, alcohol use disorder and R/O opioid use disorder and R/O amphetamine use disorder who presented for vomiting for three days. Patient has been having 4-5 beers per day; however BAL upon arrival was 372. For the past 5 days, patient has had auditory hallucinations of voices saying that they are going to attack him. They are affecting him a lot. Seeing a lot of scary things that show up all of a sudden. The last time he was in the hospital, he was told he had panic attacks for death. He was given medication, but it is no longer working. He has not taken the medications in 6 months.    Patient endorses "a little bad" mood and endorses poor sleep (hardly sleeping) but eating ok. Patient unable to explain " "if he has ever had a manic episode. The patient denies any current or history of SI/HI or any plan/intent to self-harm or harm others. Denies paranoia. No vocalized delusions.    Patient says he left the planet one night. He saw God. He went through hell. He saw people killing each other.    History primarily from consult note on 12/29/21 and updated as necessary:  Psychiatric History:   Previous Psychiatric Hospitalizations: yes  Previous Medication Trials: Yes  Previous Suicide Attempts: no   History of Violence: No  History of Depression: Yes  History of Nga: Unclear  History of Auditory/Visual Hallucination Yes  History of Delusions: Yes  Outpatient psychiatrist (current & past): No     Substance Abuse History:  Tobacco:No  Alcohol: Yes, beer-4-5 beers per day, trying to cutback  Illicit Substances: Denies; used them over a year ago  Detox/Rehab: Yes     Legal History: Past charges/incarcerations: Yes for legal status     Family Psychiatric History: Denies     Social History:  Developmental/Childhood:Achieved all developmental milestones timely  Education: 5th grade  Employment Status/Finances:Employed   Relationship Status/Sexual Orientation:   Children: 2  Housing Status: Home with family and friends   history:  NO  Access to gun: NO  Druze:Actively participates in organized Bahai  Recreational activities: Exercise   Patient was born and raised in [place]    Psychiatric Mental Status Exam:  Arousal: alert  Sensorium/Orientation: oriented to grossly intact  Behavior/Cooperation: normal, cooperative   Speech: normal tone, normal rate, normal pitch, normal volume  Language: grossly intact  Mood: " a little bad "   Affect: blunted  Thought Process: normal and logical  Thought Content:   Auditory hallucinations: YES: telling him they will attack him     Visual hallucinations: YES: lots of different things that are   Paranoia: NO  Delusions:  YES: he left the planet and saw God     Suicidal " ideation: NO  Homicidal ideation: NO  Attention/Concentration:  intact  Memory:    Recent:  Intact   Remote: Intact  Insight: limited awareness of illness  Judgment: behavior is adequate to circumstances      Past Medical History:   Past Medical History:   Diagnosis Date    Alcohol abuse     GERD (gastroesophageal reflux disease)     Hypertension       Laboratory Data:   Labs Reviewed   CBC W/ AUTO DIFFERENTIAL - Abnormal; Notable for the following components:       Result Value    RBC 4.44 (*)     Hemoglobin 13.9 (*)     Hematocrit 39.8 (*)     MCH 31.3 (*)     RDW 15.8 (*)     All other components within normal limits   COMPREHENSIVE METABOLIC PANEL - Abnormal; Notable for the following components:    Potassium 3.4 (*)     BUN 5 (*)     Total Protein 8.6 (*)      (*)      (*)     All other components within normal limits   LIPASE - Abnormal; Notable for the following components:    Lipase 290 (*)     All other components within normal limits   ALCOHOL,MEDICAL (ETHANOL) - Abnormal; Notable for the following components:    Alcohol, Serum 372 (*)     All other components within normal limits   ACETAMINOPHEN LEVEL - Abnormal; Notable for the following components:    Acetaminophen (Tylenol), Serum 0.1 (*)     All other components within normal limits   SALICYLATE LEVEL - Abnormal; Notable for the following components:    Salicylate Lvl 3.2 (*)     All other components within normal limits   MAGNESIUM   TROPONIN I HIGH SENSITIVITY   B-TYPE NATRIURETIC PEPTIDE   ACETAMINOPHEN LEVEL   DRUG SCREEN PANEL, URINE EMERGENCY   SALICYLATE LEVEL   TROPONIN I HIGH SENSITIVITY       Neurological History:  Seizures: unable to assess  Head trauma: unable to assess    Allergies:   Review of patient's allergies indicates:  No Known Allergies    Medications in ER:   Medications   sodium chloride 0.9% 1,000 mL with mvi, (ADULT) no.4 with vit K 3,300 unit- 150 mcg/10 mL 10 mL, thiamine 100 mg, folic acid 1 mg infusion (has  no administration in time range)   famotidine (PF) injection 40 mg (40 mg Intravenous Given 10/9/23 1941)       Medications at home: denied any psych med for 6 months    No new subjective & objective note has been filed under this hospital service since the last note was generated.      Assessment - Diagnosis - Goals:     Diagnosis/Impression: Patient is a 38yoM with past psychiatric history of unspecified psychotic disorder, alcohol use disorder and R/O opioid use disorder and R/O amphetamine use disorder who presented for vomiting for three days. Patient endorses AHs/VHs/paranoia that is bothersome to him. For this reason, will recommend inpatient psychiatric hospitalization.    Rec:   1. Dispo/Legal Status:  Cont PEC at this time as the pt is currently gravely disabled. Seek inpt bed for pt safety and stabilization when/if medically cleared by the ER MD.  2. Scheduled Medications: quetiapine 200mg qhs. Defer any non-psych meds to the ER MD.   3. PRN Medications: haloperidol 5mg po/im q6hr prn non-redirectable agitation associated with breakthrough psychosis or radhika if needed to help the pt more effectively interact with environment. alcohol withdrawal medications.  4. Precautions/Nursing:  alcohol withdrawal precautions  5. To-Do: Continue to observe pt's behavior while in the ER  6. Case Discussed with: Dr. Johnson    Time with patient: 28 minutes  In total, 41 minutes were spent on chart review, discussion with ED, patient interview and charting    More than 50% of the time was spent counseling/coordinating care    Consulting clinician was informed of the encounter and consult note.    Consultation ended: 10/9/2023 at 2100    Martha Valdez MD   Psychiatry  [facility]

## 2023-10-10 NOTE — ED NOTES
Pt in bed resting comfortably, RR even and unlabored, clean, clutter free environment maintained. Pt continuously visualized. Pt denies any needs or concerns at this time.

## 2023-10-10 NOTE — ED NOTES
Pt in bed resting comfortably w/ eyes closed, RR even and unlabored, environment clean and clutter/hazard free, pt denies any needs or concerns at this time.

## 2023-10-12 PROBLEM — K85.20 ALCOHOLIC PANCREATITIS: Status: ACTIVE | Noted: 2023-10-12

## 2023-10-13 PROBLEM — F10.94: Status: ACTIVE | Noted: 2023-10-13

## 2023-10-13 PROBLEM — F10.20 ALCOHOL USE DISORDER, SEVERE, DEPENDENCE: Status: ACTIVE | Noted: 2023-10-13
